# Patient Record
Sex: FEMALE | Race: WHITE | NOT HISPANIC OR LATINO | Employment: FULL TIME | URBAN - METROPOLITAN AREA
[De-identification: names, ages, dates, MRNs, and addresses within clinical notes are randomized per-mention and may not be internally consistent; named-entity substitution may affect disease eponyms.]

---

## 2017-01-05 ENCOUNTER — ALLSCRIPTS OFFICE VISIT (OUTPATIENT)
Dept: OTHER | Facility: OTHER | Age: 57
End: 2017-01-05

## 2017-01-05 ENCOUNTER — TRANSCRIBE ORDERS (OUTPATIENT)
Dept: ADMINISTRATIVE | Facility: HOSPITAL | Age: 57
End: 2017-01-05

## 2017-01-05 DIAGNOSIS — D05.11 INTRADUCTAL CARCINOMA IN SITU OF RIGHT BREAST: Primary | ICD-10-CM

## 2017-04-18 ENCOUNTER — ALLSCRIPTS OFFICE VISIT (OUTPATIENT)
Dept: OTHER | Facility: OTHER | Age: 57
End: 2017-04-18

## 2017-05-15 ENCOUNTER — HOSPITAL ENCOUNTER (OUTPATIENT)
Dept: MAMMOGRAPHY | Facility: CLINIC | Age: 57
Discharge: HOME | End: 2017-05-15
Payer: COMMERCIAL

## 2017-05-15 DIAGNOSIS — D05.11 INTRADUCTAL CARCINOMA IN SITU OF RIGHT BREAST: ICD-10-CM

## 2017-05-15 PROCEDURE — G0204 DX MAMMO INCL CAD BI: HCPCS

## 2017-08-02 ENCOUNTER — ALLSCRIPTS OFFICE VISIT (OUTPATIENT)
Dept: OTHER | Facility: OTHER | Age: 57
End: 2017-08-02

## 2018-01-12 VITALS
TEMPERATURE: 98 F | BODY MASS INDEX: 28.27 KG/M2 | RESPIRATION RATE: 16 BRPM | HEART RATE: 62 BPM | WEIGHT: 144 LBS | DIASTOLIC BLOOD PRESSURE: 64 MMHG | HEIGHT: 60 IN | SYSTOLIC BLOOD PRESSURE: 102 MMHG

## 2018-01-13 VITALS
HEIGHT: 60 IN | DIASTOLIC BLOOD PRESSURE: 62 MMHG | WEIGHT: 140 LBS | SYSTOLIC BLOOD PRESSURE: 98 MMHG | BODY MASS INDEX: 27.48 KG/M2

## 2018-01-13 VITALS
DIASTOLIC BLOOD PRESSURE: 68 MMHG | RESPIRATION RATE: 18 BRPM | TEMPERATURE: 98.3 F | WEIGHT: 141 LBS | BODY MASS INDEX: 27.68 KG/M2 | HEIGHT: 60 IN | HEART RATE: 70 BPM | SYSTOLIC BLOOD PRESSURE: 98 MMHG

## 2018-02-01 ENCOUNTER — TELEPHONE (OUTPATIENT)
Dept: SURGICAL ONCOLOGY | Facility: CLINIC | Age: 58
End: 2018-02-01

## 2018-04-16 ENCOUNTER — OFFICE VISIT (OUTPATIENT)
Dept: SURGICAL ONCOLOGY | Facility: CLINIC | Age: 58
End: 2018-04-16
Payer: COMMERCIAL

## 2018-04-16 VITALS
TEMPERATURE: 97.8 F | HEIGHT: 60 IN | SYSTOLIC BLOOD PRESSURE: 96 MMHG | HEART RATE: 70 BPM | DIASTOLIC BLOOD PRESSURE: 64 MMHG | RESPIRATION RATE: 16 BRPM | WEIGHT: 142 LBS | BODY MASS INDEX: 27.88 KG/M2

## 2018-04-16 DIAGNOSIS — D05.11 DUCTAL CARCINOMA IN SITU (DCIS) OF RIGHT BREAST: ICD-10-CM

## 2018-04-16 DIAGNOSIS — Z12.31 SCREENING MAMMOGRAM, ENCOUNTER FOR: Primary | ICD-10-CM

## 2018-04-16 PROCEDURE — 99214 OFFICE O/P EST MOD 30 MIN: CPT | Performed by: SURGERY

## 2018-04-16 NOTE — LETTER
April 16, 2018     Zamzam Conway MD  [address]    Patient: Griselda Corona   YOB: 1960   Date of Visit: 4/16/2018       Dear Dr Rayray Dinh:    Thank you for referring Stephan Dewey to me for evaluation  Below are my notes for this consultation  If you have questions, please do not hesitate to call me  I look forward to following your patient along with you  Sincerely,        Dustin Benitez MD        CC: No Recipients  Dustin Benitez MD  4/16/2018 10:43 AM  Sign at close encounter     Surgical Oncology Follow Up       [address] [place] [address] [address] Palaciosanos  1960  [de-identified]  [address] [facility],[address]  [facility] [address] [address]    Chief Complaint   Patient presents with    Breast Cancer     Pt is here for a six month follow up          Assessment & Plan:   Assessment/Plan   No evidence of disease, 5 years out  Screening mammogram, 1 year follow-up  Cancer History:        Ductal carcinoma in situ (DCIS) of right breast    5/14/2013 Initial Diagnosis     Biopsy at [facility]  Right Atypical ductal hyperplasia         6/25/2013 Surgery     Right needle localization/lumpectomy  DCIS  Grade 1      Stage 0         7/2013 -  Hormone Therapy     Med onc consult  No systemic therapy recommended         9/6/2013 - 10/23/2013 Radiation     Whole breast Radiation therapy            History of Present Illness:   See above    Interval History:   Patient is now 5 years out from her surgery  She has no complaints referable to her breast   Her imaging is due in May which will coordinate for her  We will see her back in 1 year's time  Review of Systems:   Review of Systems   Constitutional: Negative for activity change, appetite change, fatigue and unexpected weight change     HENT: Negative for ear pain, tinnitus, trouble swallowing and voice change  Eyes: Negative for pain and visual disturbance  Respiratory: Negative for cough, shortness of breath, wheezing and stridor  Cardiovascular: Negative for chest pain, palpitations and leg swelling  Gastrointestinal: Negative for abdominal distention, abdominal pain and blood in stool  Endocrine: Negative for cold intolerance and heat intolerance  Genitourinary: Negative for difficulty urinating, dysuria, flank pain and hematuria  Musculoskeletal: Negative for arthralgias, back pain, gait problem and joint swelling  Skin: Negative for color change, rash and wound  Allergic/Immunologic: Negative for immunocompromised state  Neurological: Negative for dizziness, seizures, speech difficulty, weakness and headaches  Hematological: Negative for adenopathy  Psychiatric/Behavioral: Negative for confusion         Past Medical History     Patient Active Problem List   Diagnosis    Ductal carcinoma in situ (DCIS) of right breast    Hypercholesterolemia     Past Medical History:   Diagnosis Date    Anxiety     Endometriosis     GERD (gastroesophageal reflux disease)     Hiatal hernia     History of radiation therapy     Sleep apnea      Past Surgical History:   Procedure Laterality Date    BREAST BIOPSY      biopsy breast percutaneous needle core    BREAST LUMPECTOMY Right     CHOLECYSTECTOMY      DILATION AND CURETTAGE OF UTERUS      ENDOMETRIAL BIOPSY      without cervical dilation    LAPAROSCOPY      Exploratory 1990 & 1994    SALUD-EN-Y PROCEDURE       Family History   Problem Relation Age of Onset    Colon cancer Mother     Stroke Father     Colon cancer Maternal Grandmother     Hypertension Family     Mitral valve prolapse Family     Osteoporosis Family     Varicose Veins Family      Social History     Social History    Marital status: /Civil Union     Spouse name: N/A    Number of children: N/A    Years of education: N/A     Occupational History    Not on file  Social History Main Topics    Smoking status: Never Smoker    Smokeless tobacco: Not on file    Alcohol use Yes    Drug use: Unknown    Sexual activity: Yes     Other Topics Concern    Not on file     Social History Narrative    Coffee    Exercise frequency (times/week)           Current Outpatient Prescriptions:     Cholecalciferol (VITAMIN D) 2000 units CAPS, Take by mouth daily, Disp: , Rfl:     Multiple Vitamin (MULTI-VITAMIN DAILY) TABS, Take by mouth, Disp: , Rfl:     polymyxin b-trimethoprim (POLYTRIM) ophthalmic solution, PLACE 1 DROP INTO THE LEFT EYE FOUR TIMES A DAY FOR 5 DAYS, Disp: , Rfl: 0    sertraline (ZOLOFT) 50 mg tablet, Take by mouth, Disp: , Rfl:   No Known Allergies    Physical Exam:     Vitals:    04/16/18 1019   BP: 96/64   Pulse: 70   Resp: 16   Temp: 97 8 °F (36 6 °C)     Physical Exam   Constitutional: She is oriented to person, place, and time  She appears well-developed and well-nourished  HENT:   Head: Normocephalic and atraumatic  Mouth/Throat: Oropharynx is clear and moist    Eyes: EOM are normal  Pupils are equal, round, and reactive to light  Neck: Normal range of motion  Neck supple  No JVD present  No tracheal deviation present  No thyromegaly present  Cardiovascular: Normal rate, regular rhythm, normal heart sounds and intact distal pulses  Exam reveals no gallop and no friction rub  No murmur heard  Pulmonary/Chest: Effort normal and breath sounds normal  No respiratory distress  She has no wheezes  She has no rales  Right breast exhibits no inverted nipple, no mass, no nipple discharge, no skin change and no tenderness  Left breast exhibits no inverted nipple, no mass, no nipple discharge, no skin change and no tenderness  Breasts are symmetrical    Abdominal: Soft  She exhibits no distension and no mass  There is no hepatomegaly  There is no tenderness  There is no rebound and no guarding     Musculoskeletal: Normal range of motion  She exhibits no edema or tenderness  Lymphadenopathy:     She has no cervical adenopathy  Neurological: She is alert and oriented to person, place, and time  No cranial nerve deficit  Skin: Skin is warm and dry  No rash noted  No erythema  Psychiatric: She has a normal mood and affect  Her behavior is normal    Vitals reviewed  Results:       Discussion/Summary:     Based on today's history and physical exam, there is no evidence of local, regional or distant recurrence  The patients imaging is up to date without worrisome findings  Hence she is free of any clinical or radiologic evidence of recurrent disease  We will see the patient back in one year based on the NCCN guidelines  The patient knows to contact us if she develops any signs or symptoms of recurrent disease  All questions were answered to the patients satisfaction  The patient is agreeable to see Giovanni Wiggins at her next visit  Advance Care Planning/Advance Directives:  I discussed the disease status, treatment plans and follow-up with the patient

## 2018-04-16 NOTE — PROGRESS NOTES
Surgical Oncology Follow Up       [address]  [facility] [place]  [address] Claire  1960  [de-identified]  [address]  [facility] [place]  [address] [place] [id number]    Chief Complaint   Patient presents with    Breast Cancer     Pt is here for a six month follow up          Assessment & Plan:   Assessment/Plan   No evidence of disease, 5 years out  Screening mammogram, 1 year follow-up  Cancer History:        Ductal carcinoma in situ (DCIS) of right breast    5/14/2013 Initial Diagnosis     Biopsy at [facility]  Right Atypical ductal hyperplasia         6/25/2013 Surgery     Right needle localization/lumpectomy  DCIS  Grade 1      Stage 0         7/2013 -  Hormone Therapy     Med onc consult  No systemic therapy recommended         9/6/2013 - 10/23/2013 Radiation     Whole breast Radiation therapy            History of Present Illness:   See above    Interval History:   Patient is now 5 years out from her surgery  She has no complaints referable to her breast   Her imaging is due in May which will coordinate for her  We will see her back in 1 year's time  Review of Systems:   Review of Systems   Constitutional: Negative for activity change, appetite change, fatigue and unexpected weight change  HENT: Negative for ear pain, tinnitus, trouble swallowing and voice change  Eyes: Negative for pain and visual disturbance  Respiratory: Negative for cough, shortness of breath, wheezing and stridor  Cardiovascular: Negative for chest pain, palpitations and leg swelling  Gastrointestinal: Negative for abdominal distention, abdominal pain and blood in stool  Endocrine: Negative for cold intolerance and heat intolerance  Genitourinary: Negative for difficulty urinating, dysuria, flank pain and hematuria     Musculoskeletal: Negative for arthralgias, back pain, gait problem and joint swelling  Skin: Negative for color change, rash and wound  Allergic/Immunologic: Negative for immunocompromised state  Neurological: Negative for dizziness, seizures, speech difficulty, weakness and headaches  Hematological: Negative for adenopathy  Psychiatric/Behavioral: Negative for confusion  Past Medical History     Patient Active Problem List   Diagnosis    Ductal carcinoma in situ (DCIS) of right breast    Hypercholesterolemia     Past Medical History:   Diagnosis Date    Anxiety     Endometriosis     GERD (gastroesophageal reflux disease)     Hiatal hernia     History of radiation therapy     Sleep apnea      Past Surgical History:   Procedure Laterality Date    BREAST BIOPSY      biopsy breast percutaneous needle core    BREAST LUMPECTOMY Right     CHOLECYSTECTOMY      DILATION AND CURETTAGE OF UTERUS      ENDOMETRIAL BIOPSY      without cervical dilation    LAPAROSCOPY      Exploratory 1990 & 1994    SALUD-EN-Y PROCEDURE       Family History   Problem Relation Age of Onset    Colon cancer Mother     Stroke Father     Colon cancer Maternal Grandmother     Hypertension Family     Mitral valve prolapse Family     Osteoporosis Family     Varicose Veins Family      Social History     Social History    Marital status: /Civil Union     Spouse name: N/A    Number of children: N/A    Years of education: N/A     Occupational History    Not on file       Social History Main Topics    Smoking status: Never Smoker    Smokeless tobacco: Not on file    Alcohol use Yes    Drug use: Unknown    Sexual activity: Yes     Other Topics Concern    Not on file     Social History Narrative    Coffee    Exercise frequency (times/week)           Current Outpatient Prescriptions:     Cholecalciferol (VITAMIN D) 2000 units CAPS, Take by mouth daily, Disp: , Rfl:     Multiple Vitamin (MULTI-VITAMIN DAILY) TABS, Take by mouth, Disp: , Rfl:     polymyxin b-trimethoprim (POLYTRIM) ophthalmic solution, PLACE 1 DROP INTO THE LEFT EYE FOUR TIMES A DAY FOR 5 DAYS, Disp: , Rfl: 0    sertraline (ZOLOFT) 50 mg tablet, Take by mouth, Disp: , Rfl:   No Known Allergies    Physical Exam:     Vitals:    04/16/18 1019   BP: 96/64   Pulse: 70   Resp: 16   Temp: 97 8 °F (36 6 °C)     Physical Exam   Constitutional: She is oriented to person, place, and time  She appears well-developed and well-nourished  HENT:   Head: Normocephalic and atraumatic  Mouth/Throat: Oropharynx is clear and moist    Eyes: EOM are normal  Pupils are equal, round, and reactive to light  Neck: Normal range of motion  Neck supple  No JVD present  No tracheal deviation present  No thyromegaly present  Cardiovascular: Normal rate, regular rhythm, normal heart sounds and intact distal pulses  Exam reveals no gallop and no friction rub  No murmur heard  Pulmonary/Chest: Effort normal and breath sounds normal  No respiratory distress  She has no wheezes  She has no rales  Right breast exhibits no inverted nipple, no mass, no nipple discharge, no skin change and no tenderness  Left breast exhibits no inverted nipple, no mass, no nipple discharge, no skin change and no tenderness  Breasts are symmetrical    Abdominal: Soft  She exhibits no distension and no mass  There is no hepatomegaly  There is no tenderness  There is no rebound and no guarding  Musculoskeletal: Normal range of motion  She exhibits no edema or tenderness  Lymphadenopathy:     She has no cervical adenopathy  Neurological: She is alert and oriented to person, place, and time  No cranial nerve deficit  Skin: Skin is warm and dry  No rash noted  No erythema  Psychiatric: She has a normal mood and affect  Her behavior is normal    Vitals reviewed  Results:       Discussion/Summary:     Based on today's history and physical exam, there is no evidence of local, regional or distant recurrence   The patients imaging is up to date without worrisome findings  Hence she is free of any clinical or radiologic evidence of recurrent disease  We will see the patient back in one year based on the NCCN guidelines  The patient knows to contact us if she develops any signs or symptoms of recurrent disease  All questions were answered to the patients satisfaction  The patient is agreeable to see Uriah Goel at her next visit  Advance Care Planning/Advance Directives:  I discussed the disease status, treatment plans and follow-up with the patient

## 2018-06-04 ENCOUNTER — HOSPITAL ENCOUNTER (OUTPATIENT)
Dept: MAMMOGRAPHY | Facility: CLINIC | Age: 58
Discharge: HOME | End: 2018-06-04
Payer: COMMERCIAL

## 2018-06-04 DIAGNOSIS — R92.8 ABNORMAL MAMMOGRAM: ICD-10-CM

## 2018-06-04 PROCEDURE — G0279 TOMOSYNTHESIS, MAMMO: HCPCS

## 2018-06-04 PROCEDURE — 77066 DX MAMMO INCL CAD BI: CPT

## 2019-01-17 ENCOUNTER — ANNUAL EXAM (OUTPATIENT)
Dept: OBGYN CLINIC | Facility: MEDICAL CENTER | Age: 59
End: 2019-01-17
Payer: COMMERCIAL

## 2019-01-17 VITALS
BODY MASS INDEX: 30.63 KG/M2 | HEIGHT: 60 IN | SYSTOLIC BLOOD PRESSURE: 120 MMHG | WEIGHT: 156 LBS | DIASTOLIC BLOOD PRESSURE: 74 MMHG

## 2019-01-17 DIAGNOSIS — Z12.4 ENCOUNTER FOR PAPANICOLAOU SMEAR OF CERVIX WITH HUMAN PAPILLOMA VIRUS (HPV) DNA COTESTING: ICD-10-CM

## 2019-01-17 DIAGNOSIS — Z01.419 ENCNTR FOR GYN EXAM (GENERAL) (ROUTINE) W/O ABN FINDINGS: Primary | ICD-10-CM

## 2019-01-17 PROCEDURE — G0145 SCR C/V CYTO,THINLAYER,RESCR: HCPCS | Performed by: PHYSICIAN ASSISTANT

## 2019-01-17 PROCEDURE — 87624 HPV HI-RISK TYP POOLED RSLT: CPT | Performed by: PHYSICIAN ASSISTANT

## 2019-01-17 PROCEDURE — 99396 PREV VISIT EST AGE 40-64: CPT | Performed by: PHYSICIAN ASSISTANT

## 2019-01-17 NOTE — PROGRESS NOTES
Assessment/Plan:    Encntr for gyn exam (general) (routine) w/o abn findings  I have discussed the importance of monthly self-breast exams, exercise and healthy diet as well as adequate intake of calcium and vitamin D  The patient declines STD testing  The current ASCCP guidelines were reviewed  The low risk patient will receive pap smear screening every 3 years or pap with HPV co-testing every 5 years  The patient previously had PAP in 2016 and is due again today  I emphasized the importance of an annual pelvic and breast exam  A yearly mammogram is recommended for breast cancer screening starting at age 36  All questions have been answered to her satisfaction  Diagnoses and all orders for this visit:    Encntr for gyn exam (general) (routine) w/o abn findings  -     Liquid-based pap, screening    Encounter for Papanicolaou smear of cervix with human papilloma virus (HPV) DNA cotesting  -     Liquid-based pap, screening        Subjective:      Patient ID: Laura Gibson is a 60 y o  female  The patient comes in today for annual Gyn exam  The patient has no history of PMB, pelvic pain, abnormal vaginal discharge, breast mass or lumps, urinary symptoms, urinary incontinence, depression, and pelvic/vaginal pressure  H/o R breast CA  - s/p lumpectomy and radiation tx  Up to date w/ imaging - no evidence of recurrence  Follows yearly w/ Dr Gallegos Records  Pt reports all additional systems reviewed are negative  The patient admits to monthly self breast exams, regular exercise, healthy diet,  and calcium and Vitamin D intake  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            The following portions of the patient's history were reviewed and updated as appropriate: allergies, current medications, past family history, past medical history, past social history, past surgical history and problem list     Review of Systems   Constitutional: Negative for appetite change, fatigue and unexpected weight change  Respiratory: Negative for chest tightness and shortness of breath  Cardiovascular: Negative for chest pain, palpitations and leg swelling  Gastrointestinal: Negative for abdominal pain, constipation, diarrhea, nausea and vomiting  Genitourinary: Negative for difficulty urinating, dyspareunia, menstrual problem, pelvic pain and vaginal discharge  Musculoskeletal: Negative for arthralgias and myalgias  Neurological: Negative for dizziness, light-headedness and headaches  Psychiatric/Behavioral: Negative for dysphoric mood  The patient is not nervous/anxious  All other systems reviewed and are negative  Objective:      /74 (BP Location: Left arm, Patient Position: Sitting)   Ht 5' (1 524 m)   Wt 70 8 kg (156 lb)   BMI 30 47 kg/m²          Physical Exam   Constitutional: She is oriented to person, place, and time  She appears well-developed and well-nourished  HENT:   Head: Normocephalic and atraumatic  Neck: Neck supple  No thyromegaly present  Cardiovascular: Normal rate and regular rhythm  Pulmonary/Chest: Effort normal and breath sounds normal  Right breast exhibits no inverted nipple, no mass, no nipple discharge, no skin change and no tenderness  Left breast exhibits no inverted nipple, no mass, no nipple discharge, no skin change and no tenderness  Abdominal: Soft  Bowel sounds are normal  Hernia confirmed negative in the right inguinal area and confirmed negative in the left inguinal area  Genitourinary: Vagina normal and uterus normal  No breast swelling, tenderness, discharge or bleeding  There is no rash, tenderness, lesion or injury on the right labia  There is no rash, tenderness, lesion or injury on the left labia  Uterus is not deviated, not enlarged, not fixed and not tender  Cervix exhibits no motion tenderness, no discharge and no friability  Right adnexum displays no mass, no tenderness and no fullness   Left adnexum displays no mass, no tenderness and no fullness  No vaginal discharge found  Neurological: She is alert and oriented to person, place, and time  Skin: Skin is warm and dry  Psychiatric: She has a normal mood and affect  Nursing note and vitals reviewed

## 2019-01-17 NOTE — ASSESSMENT & PLAN NOTE
I have discussed the importance of monthly self-breast exams, exercise and healthy diet as well as adequate intake of calcium and vitamin D  The patient declines STD testing  The current ASCCP guidelines were reviewed  The low risk patient will receive pap smear screening every 3 years or pap with HPV co-testing every 5 years  The patient previously had PAP in 2016 and is due again today  I emphasized the importance of an annual pelvic and breast exam  A yearly mammogram is recommended for breast cancer screening starting at age 36  All questions have been answered to her satisfaction

## 2019-01-18 LAB
HPV HR 12 DNA CVX QL NAA+PROBE: POSITIVE
HPV16 DNA CVX QL NAA+PROBE: NEGATIVE
HPV18 DNA CVX QL NAA+PROBE: NEGATIVE

## 2019-01-23 LAB
LAB AP GYN PRIMARY INTERPRETATION: NORMAL
Lab: NORMAL

## 2019-01-24 ENCOUNTER — TELEPHONE (OUTPATIENT)
Dept: OBGYN CLINIC | Facility: CLINIC | Age: 59
End: 2019-01-24

## 2019-01-24 NOTE — TELEPHONE ENCOUNTER
----- Message from Rob Rodriguez PA-C sent at 1/23/2019  4:37 PM EST -----  Normal cytology +other high risk HPV  rec repeat testing in 1 yr  thanks

## 2019-07-02 ENCOUNTER — TELEPHONE (OUTPATIENT)
Dept: SURGICAL ONCOLOGY | Facility: CLINIC | Age: 59
End: 2019-07-02

## 2019-07-02 NOTE — TELEPHONE ENCOUNTER
Called patient regarding overdue follow up and mammogram  Patient states that she lost her job and has been without insurance  Offered to reach out to the oncology financial counselors regarding this; patient accepted  Patient stated she wants to continue having her follow up appointments and mammograms when she can afford it  Patient stated she will call the office to schedule an appointment when she has insurance

## 2019-07-03 ENCOUNTER — TELEPHONE (OUTPATIENT)
Dept: HEMATOLOGY ONCOLOGY | Facility: CLINIC | Age: 59
End: 2019-07-03

## 2019-07-03 NOTE — TELEPHONE ENCOUNTER
LMOM with contact info for pt- requires FC as she is has no insurance and requires f/u for her breast ca

## 2019-07-16 ENCOUNTER — DOCUMENTATION (OUTPATIENT)
Dept: HEMATOLOGY ONCOLOGY | Facility: CLINIC | Age: 59
End: 2019-07-16

## 2019-07-16 NOTE — PROGRESS NOTES
recvd email that pt has no insurance & is overdue for her mammogram  Called pt & she said that she lost her job in [place] 2019 & her insurance termd end of jan  She sd her daughter has medicaid & is treated differently so pt doesn't want to apply for that  She said that she will be running out of her unemployment in 2-4 weeks but she would rather wit until she gets a job before she schedules her tests  She is hoping to have a job soon  Told her about [facility] but she wants to wait & see  She did say that if she changes her mind she will call back to me   Emailed [facility] that she doesn't have to f/u w/the pt

## 2019-12-09 ENCOUNTER — TRANSCRIBE ORDERS (OUTPATIENT)
Dept: ADMINISTRATIVE | Facility: HOSPITAL | Age: 59
End: 2019-12-09

## 2019-12-09 DIAGNOSIS — Z12.31 ENCOUNTER FOR SCREENING MAMMOGRAM FOR MALIGNANT NEOPLASM OF BREAST: ICD-10-CM

## 2019-12-09 DIAGNOSIS — Z02.1 PRE-EMPLOYMENT HEALTH SCREENING EXAMINATION: Primary | ICD-10-CM

## 2019-12-11 ENCOUNTER — HOSPITAL ENCOUNTER (OUTPATIENT)
Dept: MAMMOGRAPHY | Facility: CLINIC | Age: 59
Discharge: HOME | End: 2019-12-11
Payer: COMMERCIAL

## 2019-12-11 VITALS — BODY MASS INDEX: 30.63 KG/M2 | HEIGHT: 60 IN | WEIGHT: 156 LBS

## 2019-12-11 DIAGNOSIS — Z12.31 ENCOUNTER FOR SCREENING MAMMOGRAM FOR MALIGNANT NEOPLASM OF BREAST: ICD-10-CM

## 2019-12-11 PROCEDURE — 77063 BREAST TOMOSYNTHESIS BI: CPT

## 2019-12-11 PROCEDURE — 77067 SCR MAMMO BI INCL CAD: CPT

## 2019-12-19 ENCOUNTER — OFFICE VISIT (OUTPATIENT)
Dept: SURGICAL ONCOLOGY | Facility: CLINIC | Age: 59
End: 2019-12-19
Payer: COMMERCIAL

## 2019-12-19 VITALS
WEIGHT: 145 LBS | SYSTOLIC BLOOD PRESSURE: 118 MMHG | HEIGHT: 60 IN | TEMPERATURE: 98 F | RESPIRATION RATE: 16 BRPM | DIASTOLIC BLOOD PRESSURE: 78 MMHG | BODY MASS INDEX: 28.47 KG/M2 | HEART RATE: 64 BPM

## 2019-12-19 DIAGNOSIS — Z12.31 SCREENING MAMMOGRAM, ENCOUNTER FOR: ICD-10-CM

## 2019-12-19 DIAGNOSIS — Z86.000 HISTORY OF DUCTAL CARCINOMA IN SITU (DCIS) OF BREAST: Primary | ICD-10-CM

## 2019-12-19 DIAGNOSIS — Z08 ENCOUNTER FOR FOLLOW-UP SURVEILLANCE OF DUCTAL CARCINOMA IN SITU OF BREAST: ICD-10-CM

## 2019-12-19 DIAGNOSIS — Z86.000 ENCOUNTER FOR FOLLOW-UP SURVEILLANCE OF DUCTAL CARCINOMA IN SITU OF BREAST: ICD-10-CM

## 2019-12-19 PROCEDURE — 99213 OFFICE O/P EST LOW 20 MIN: CPT | Performed by: SURGERY

## 2019-12-19 NOTE — PROGRESS NOTES
Surgical Oncology Follow Up       [address] [facility],[address]  [facility]  [address] [facility] [address] Kathleen Martino  1960  [de-identified]  [address]  [facility]  [address] [address]    Chief Complaint   Patient presents with    Follow-up          Assessment & Plan:   Melanie Bruns presents for 1 year follow-up visit  She has no complaints referable to her breast   Her mammogram showed no worrisome findings  We will coordinate a screening mammogram for approximately 1 year from now and see her back at that time  She is agreeable see our advanced practitioner  She understands to contact us should she appreciate any worrisome findings regarding her breast   I have also encouraged her to have a low threshold to contact us  Cancer History:        History of ductal carcinoma in situ (DCIS) of breast    5/14/2013 Initial Diagnosis     Biopsy at [facility]  Right Atypical ductal hyperplasia      6/25/2013 Surgery     Right needle localization/lumpectomy  DCIS  Grade 1      Stage 0      7/2013 -  Hormone Therapy     Med onc consult  No systemic therapy recommended      9/6/2013 - 10/23/2013 Radiation     Whole breast Radiation therapy           Interval History:   See Assessment & Plan    Review of Systems:   Review of Systems   Constitutional: Negative for activity change, appetite change and fatigue  HENT: Negative  Eyes: Negative  Respiratory: Negative for cough, shortness of breath and wheezing  Cardiovascular: Negative for chest pain and leg swelling  Gastrointestinal: Negative  Endocrine: Negative  Genitourinary: Negative  Musculoskeletal:        No new changes or complaints of bone pain   Skin: Negative  Allergic/Immunologic: Negative  Neurological: Negative  Hematological: Negative  Psychiatric/Behavioral: Negative          Past Medical History Patient Active Problem List   Diagnosis    History of ductal carcinoma in situ (DCIS) of breast    Hypercholesterolemia    Asthma    Mixed hyperlipidemia    Obesity, unspecified     Past Medical History:   Diagnosis Date    Anxiety     Breast cancer ([address] ) 01/01/2012    Endometriosis     Forceps delivery     1991 daughter    GERD (gastroesophageal reflux disease)     Hiatal hernia     History of radiation therapy     Infertility, female     Normal delivery     1998 daughter    Sleep apnea      Past Surgical History:   Procedure Laterality Date    BREAST BIOPSY Right 01/01/2012    biopsy breast percutaneous needle core    BREAST LUMPECTOMY Right 01/01/2012    CHOLECYSTECTOMY      DILATION AND CURETTAGE OF UTERUS      ENDOMETRIAL BIOPSY      without cervical dilation    LAPAROSCOPY      Exploratory 1990 & 1994    SALUD-EN-Y PROCEDURE       Family History   Problem Relation Age of Onset    Colon cancer Mother 48    Stroke Father     No Known Problems Maternal Grandmother     Hypertension Family     Mitral valve prolapse Family     Osteoporosis Family     Varicose Veins Family     No Known Problems Sister     No Known Problems Daughter     No Known Problems Paternal Grandmother     No Known Problems Daughter     No Known Problems Paternal Aunt      Social History     Socioeconomic History    Marital status: /Civil Union     Spouse name: Not on file    Number of children: Not on file    Years of education: Not on file    Highest education level: Not on file   Occupational History    Not on file   Social Needs    Financial resource strain: Not on file    Food insecurity:     Worry: Not on file     Inability: Not on file    Transportation needs:     Medical: Not on file     Non-medical: Not on file   Tobacco Use    Smoking status: Never Smoker   Substance and Sexual Activity    Alcohol use:  Yes    Drug use: Not on file    Sexual activity: Yes   Lifestyle    Physical activity:     Days per week: Not on file     Minutes per session: Not on file    Stress: Not on file   Relationships    Social connections:     Talks on phone: Not on file     Gets together: Not on file     Attends [employer] service: Not on file     Active member of club or organization: Not on file     Attends meetings of clubs or organizations: Not on file     Relationship status: Not on file    Intimate partner violence:     Fear of current or ex partner: Not on file     Emotionally abused: Not on file     Physically abused: Not on file     Forced sexual activity: Not on file   Other Topics Concern    Not on file   Social History Narrative    Coffee    Exercise frequency (times/week)       Current Outpatient Medications:     Cholecalciferol (VITAMIN D) 2000 units CAPS, Take by mouth daily, Disp: , Rfl:     Multiple Vitamin (MULTI-VITAMIN DAILY) TABS, Take by mouth, Disp: , Rfl:     sertraline (ZOLOFT) 50 mg tablet, Take by mouth, Disp: , Rfl:   No Known Allergies    Physical Exam:     Vitals:    12/19/19 1147   BP: 118/78   Pulse: 64   Resp: 16   Temp: 98 °F (36 7 °C)     Physical Exam   Constitutional: She is oriented to person, place, and time  She appears well-developed and well-nourished  HENT:   Head: Normocephalic and atraumatic  Mouth/Throat: Oropharynx is clear and moist    Eyes: Pupils are equal, round, and reactive to light  EOM are normal    Neck: Normal range of motion  Neck supple  No JVD present  No tracheal deviation present  No thyromegaly present  Cardiovascular: Normal rate, regular rhythm, normal heart sounds and intact distal pulses  Exam reveals no gallop and no friction rub  No murmur heard  Pulmonary/Chest: Effort normal and breath sounds normal  No respiratory distress  She has no wheezes  She has no rales  Examination of the right breast demonstrates a well-healed lower outer quadrant incision  There is some indentation from this this is stable    There is minimal scar tissue in this region  The remainder of her skin and breast parenchyma show no worrisome findings specifically no dominant masses  There is no axillary adenopathy  Examination of the left breast demonstrates no skin changes nipple discharge dominant masses or axillary adenopathy  Abdominal: Soft  She exhibits no distension and no mass  There is no hepatomegaly  There is no tenderness  There is no rebound and no guarding  Musculoskeletal: Normal range of motion  She exhibits no edema or tenderness  Lymphadenopathy:     She has no cervical adenopathy  Neurological: She is alert and oriented to person, place, and time  No cranial nerve deficit  Skin: Skin is warm and dry  No rash noted  No erythema  Psychiatric: She has a normal mood and affect  Her behavior is normal    Vitals reviewed  Results & Discussion:   Patient is free of any clinical or radiographic evidence of disease  We will see her back in 1 year's time with a screening mammogram         Advance Care Planning/Advance Directives:  I discussed the disease status, treatment plans and follow-up with the patient

## 2019-12-20 ENCOUNTER — APPOINTMENT (OUTPATIENT)
Dept: URGENT CARE | Facility: MEDICAL CENTER | Age: 59
End: 2019-12-20

## 2019-12-20 DIAGNOSIS — Z02.1 PRE-EMPLOYMENT HEALTH SCREENING EXAMINATION: ICD-10-CM

## 2019-12-20 LAB — HBV SURFACE AB SER-ACNC: 119.06 MIU/ML

## 2019-12-20 PROCEDURE — 86480 TB TEST CELL IMMUN MEASURE: CPT

## 2019-12-20 PROCEDURE — 86706 HEP B SURFACE ANTIBODY: CPT

## 2019-12-20 PROCEDURE — 86787 VARICELLA-ZOSTER ANTIBODY: CPT

## 2019-12-23 LAB
GAMMA INTERFERON BACKGROUND BLD IA-ACNC: 0.07 IU/ML
M TB IFN-G BLD-IMP: NEGATIVE
M TB IFN-G CD4+ BCKGRND COR BLD-ACNC: 0.02 IU/ML
M TB IFN-G CD4+ BCKGRND COR BLD-ACNC: 0.03 IU/ML
MITOGEN IGNF BCKGRD COR BLD-ACNC: >10 IU/ML
VZV IGG SER IA-ACNC: NORMAL

## 2020-02-18 ENCOUNTER — ANNUAL EXAM (OUTPATIENT)
Dept: OBGYN CLINIC | Facility: MEDICAL CENTER | Age: 60
End: 2020-02-18
Payer: COMMERCIAL

## 2020-02-18 VITALS
DIASTOLIC BLOOD PRESSURE: 64 MMHG | WEIGHT: 143.8 LBS | SYSTOLIC BLOOD PRESSURE: 106 MMHG | BODY MASS INDEX: 28.23 KG/M2 | HEIGHT: 60 IN

## 2020-02-18 DIAGNOSIS — Z12.31 ENCOUNTER FOR SCREENING MAMMOGRAM FOR MALIGNANT NEOPLASM OF BREAST: ICD-10-CM

## 2020-02-18 DIAGNOSIS — Z86.000 HISTORY OF DUCTAL CARCINOMA IN SITU (DCIS) OF BREAST: ICD-10-CM

## 2020-02-18 DIAGNOSIS — Z11.51 SCREENING FOR HPV (HUMAN PAPILLOMAVIRUS): ICD-10-CM

## 2020-02-18 DIAGNOSIS — Z01.419 ENCOUNTER FOR GYNECOLOGICAL EXAMINATION WITHOUT ABNORMAL FINDING: Primary | ICD-10-CM

## 2020-02-18 PROCEDURE — 99396 PREV VISIT EST AGE 40-64: CPT | Performed by: NURSE PRACTITIONER

## 2020-02-18 PROCEDURE — G0145 SCR C/V CYTO,THINLAYER,RESCR: HCPCS | Performed by: NURSE PRACTITIONER

## 2020-02-18 PROCEDURE — 87624 HPV HI-RISK TYP POOLED RSLT: CPT | Performed by: NURSE PRACTITIONER

## 2020-02-18 NOTE — ASSESSMENT & PLAN NOTE
Benign findings on routine gyn exam  Recommended monthly SBE, annual CBE and annual screening mammo  ASCCP guidelines reviewed and pap with cotesting collected today; this low risk patient was advised she meets criteria to d/c pap screening at age 71  Colonoscopy noted to be up to date  The patient denies STI risk factors and declines testing at this time  Reviewed diet/activity recommendations:  Encouraged daily Ca++ and vitamin D intake as well as daily weight bearing exercise for promotion of bone health    Discussed postmenopausal considerations and symptoms to report  RTO in one year for routine annual gyn exam or sooner PRN

## 2020-02-18 NOTE — PROGRESS NOTES
Encounter for gynecological examination without abnormal finding  Benign findings on routine gyn exam  Recommended monthly SBE, annual CBE and annual screening mammo  ASCCP guidelines reviewed and pap with cotesting collected today; this low risk patient was advised she meets criteria to d/c pap screening at age 71  Colonoscopy noted to be up to date  The patient denies STI risk factors and declines testing at this time  Reviewed diet/activity recommendations:  Encouraged daily Ca++ and vitamin D intake as well as daily weight bearing exercise for promotion of bone health    Discussed postmenopausal considerations and symptoms to report  RTO in one year for routine annual gyn exam or sooner PRN  History of ductal carcinoma in situ (DCIS) of breast  S/P lumpectomy and radiation  Diagnoses and all orders for this visit:    Encounter for gynecological examination without abnormal finding  -     Liquid-based pap, screening    Screening for HPV (human papillomavirus)  -     Liquid-based pap, screening    Encounter for screening mammogram for malignant neoplasm of breast  -     Mammo screening bilateral w 3d & cad; Future    History of ductal carcinoma in situ (DCIS) of breast         [facility]   1960    CC:  Yearly exam    S: Nadia Stoll is a  60 y o  female here for yearly exam  She is postmenopausal and has had no vaginal bleeding  She denies abnormal vaginal discharge, itching, odor or dryness  She denies breast concerns, abdominal/pelvic pain or bladder/bowel dysfunction  Sexual activity: She is sexually active without pain, bleeding or dryness  STD testing: She does want STD testing today       Last Pap: 1/19 normal pap + HR HPV not 16 or 18   Last Mammo: 12/19 NATALIE 2  SBE: monthly   Last Colonoscopy: 3/16     Family hx of breast cancer:  Patient   Family hx of ovarian cancer: denies  Family hx of colon cancer: denies       Current Outpatient Medications:     Cholecalciferol (VITAMIN D) 2000 units CAPS, Take by mouth daily, Disp: , Rfl:     Multiple Vitamin (MULTI-VITAMIN DAILY) TABS, Take by mouth, Disp: , Rfl:     sertraline (ZOLOFT) 50 mg tablet, Take by mouth, Disp: , Rfl:   Social History     Socioeconomic History    Marital status: /Civil Union     Spouse name: Not on file    Number of children: Not on file    Years of education: Not on file    Highest education level: Not on file   Occupational History    Not on file   Social Needs    Financial resource strain: Not on file    Food insecurity:     Worry: Not on file     Inability: Not on file    Transportation needs:     Medical: Not on file     Non-medical: Not on file   Tobacco Use    Smoking status: Never Smoker    Smokeless tobacco: Never Used   Substance and Sexual Activity    Alcohol use:  Yes    Drug use: Never    Sexual activity: Yes     Partners: Male   Lifestyle    Physical activity:     Days per week: Not on file     Minutes per session: Not on file    Stress: Not on file   Relationships    Social connections:     Talks on phone: Not on file     Gets together: Not on file     Attends [employer] service: Not on file     Active member of club or organization: Not on file     Attends meetings of clubs or organizations: Not on file     Relationship status: Not on file    Intimate partner violence:     Fear of current or ex partner: Not on file     Emotionally abused: Not on file     Physically abused: Not on file     Forced sexual activity: Not on file   Other Topics Concern    Not on file   Social History Narrative    Coffee    Exercise frequency (times/week)     Family History   Problem Relation Age of Onset    Colon cancer Mother 48    Stroke Father     No Known Problems Maternal Grandmother     Hypertension Family     Mitral valve prolapse Family     Osteoporosis Family     Varicose Veins Family     No Known Problems Sister     No Known Problems Daughter     No Known Problems Paternal Grandmother    [facility] No Known Problems Daughter     No Known Problems Paternal Aunt      Past Medical History:   Diagnosis Date    Anxiety     Breast [address] [address] ) 01/01/2012    Endometriosis     Forceps delivery     1991 daughter    GERD (gastroesophageal reflux disease)     Hiatal hernia     History of radiation therapy     Infertility, female     Normal delivery     1998 daughter    Sleep apnea         Review of Systems   Constitutional: Negative for appetite change, fatigue and unexpected weight change  Respiratory: Negative for shortness of breath  Cardiovascular: Negative for chest pain and leg swelling  Gastrointestinal: Negative for abdominal pain  Endocrine: Negative for cold intolerance and heat intolerance  Breasts:  Negative for breast tenderness or masses  Genitourinary: Negative  Negative for dyspareunia, dysuria, flank pain, frequency, genital sores, hematuria and pelvic pain  Negative for stress incontinence  Musculoskeletal: Negative for back pain  Neurological: Negative for headaches  O:  Blood pressure 106/64, height 5' (1 524 m), weight 65 2 kg (143 lb 12 8 oz)  Patient appears well and is not in distress  Neck is supple without masses  Normal thyroid  Heart regular rate and rhythm  Lungs CTA bilaterally   Breasts are symmetrical without mass, tenderness, nipple discharge, skin changes or adenopathy  + scar right breast mid outer region   Abdomen is soft and nontender without masses  External genitals are normal without lesions or rashes  Urethral meatus and urethra are normal  Bladder is normal to palpation  Vagina is normal without discharge or bleeding  Moderate atrophy noted   Cervix is normal without discharge or lesion  Uterus is normal, mobile, nontender without palpable mass  Adnexa are normal, nontender, without palpable mass     Skin warm and dry   Capillary refill < 2 seconds  Alert and oriented x 3 with normal affect

## 2020-02-19 LAB
HPV HR 12 DNA CVX QL NAA+PROBE: NEGATIVE
HPV16 DNA CVX QL NAA+PROBE: NEGATIVE
HPV18 DNA CVX QL NAA+PROBE: NEGATIVE

## 2020-02-21 LAB
LAB AP GYN PRIMARY INTERPRETATION: NORMAL
Lab: NORMAL

## 2020-02-27 ENCOUNTER — TELEPHONE (OUTPATIENT)
Dept: OBGYN CLINIC | Facility: CLINIC | Age: 60
End: 2020-02-27

## 2020-03-18 ENCOUNTER — APPOINTMENT (OUTPATIENT)
Dept: LAB | Age: 60
End: 2020-03-18

## 2020-03-18 ENCOUNTER — TRANSCRIBE ORDERS (OUTPATIENT)
Dept: ADMINISTRATIVE | Age: 60
End: 2020-03-18

## 2020-03-18 DIAGNOSIS — Z00.8 HEALTH EXAMINATION IN POPULATION SURVEY: ICD-10-CM

## 2020-03-18 DIAGNOSIS — Z00.8 HEALTH EXAMINATION IN POPULATION SURVEY: Primary | ICD-10-CM

## 2020-03-18 LAB
CHOLEST SERPL-MCNC: 190 MG/DL (ref 50–200)
EST. AVERAGE GLUCOSE BLD GHB EST-MCNC: 108 MG/DL
HBA1C MFR BLD: 5.4 %
HDLC SERPL-MCNC: 67 MG/DL
LDLC SERPL CALC-MCNC: 102 MG/DL (ref 0–100)
NONHDLC SERPL-MCNC: 123 MG/DL
TRIGL SERPL-MCNC: 103 MG/DL

## 2020-03-18 PROCEDURE — 80061 LIPID PANEL: CPT

## 2020-03-18 PROCEDURE — 36415 COLL VENOUS BLD VENIPUNCTURE: CPT

## 2020-03-18 PROCEDURE — 83036 HEMOGLOBIN GLYCOSYLATED A1C: CPT

## 2020-08-13 DIAGNOSIS — G47.33 OBSTRUCTIVE SLEEP APNEA: Primary | ICD-10-CM

## 2020-11-29 ENCOUNTER — OFFICE VISIT (OUTPATIENT)
Dept: URGENT CARE | Age: 60
End: 2020-11-29
Payer: COMMERCIAL

## 2020-11-29 VITALS — WEIGHT: 134 LBS | BODY MASS INDEX: 26.31 KG/M2 | HEIGHT: 60 IN

## 2020-11-29 DIAGNOSIS — R05.9 COUGH: Primary | ICD-10-CM

## 2020-11-29 DIAGNOSIS — J02.9 SORE THROAT: ICD-10-CM

## 2020-11-29 DIAGNOSIS — R09.81 NASAL CONGESTION: ICD-10-CM

## 2020-11-29 PROCEDURE — G0381 LEV 2 HOSP TYPE B ED VISIT: HCPCS | Performed by: PHYSICIAN ASSISTANT

## 2020-11-29 PROCEDURE — U0003 INFECTIOUS AGENT DETECTION BY NUCLEIC ACID (DNA OR RNA); SEVERE ACUTE RESPIRATORY SYNDROME CORONAVIRUS 2 (SARS-COV-2) (CORONAVIRUS DISEASE [COVID-19]), AMPLIFIED PROBE TECHNIQUE, MAKING USE OF HIGH THROUGHPUT TECHNOLOGIES AS DESCRIBED BY CMS-2020-01-R: HCPCS | Performed by: PHYSICIAN ASSISTANT

## 2020-12-01 LAB — SARS-COV-2 RNA SPEC QL NAA+PROBE: NOT DETECTED

## 2020-12-02 ENCOUNTER — OFFICE VISIT (OUTPATIENT)
Dept: URGENT CARE | Facility: MEDICAL CENTER | Age: 60
End: 2020-12-02
Payer: COMMERCIAL

## 2020-12-02 VITALS
SYSTOLIC BLOOD PRESSURE: 111 MMHG | HEART RATE: 69 BPM | HEIGHT: 60 IN | DIASTOLIC BLOOD PRESSURE: 66 MMHG | OXYGEN SATURATION: 96 % | TEMPERATURE: 97.8 F | WEIGHT: 138 LBS | RESPIRATION RATE: 16 BRPM | BODY MASS INDEX: 27.09 KG/M2

## 2020-12-02 DIAGNOSIS — J01.10 ACUTE NON-RECURRENT FRONTAL SINUSITIS: Primary | ICD-10-CM

## 2020-12-02 PROCEDURE — G0382 LEV 3 HOSP TYPE B ED VISIT: HCPCS | Performed by: PHYSICIAN ASSISTANT

## 2020-12-02 RX ORDER — AMOXICILLIN AND CLAVULANATE POTASSIUM 875; 125 MG/1; MG/1
1 TABLET, FILM COATED ORAL EVERY 12 HOURS SCHEDULED
Qty: 14 TABLET | Refills: 0 | Status: SHIPPED | OUTPATIENT
Start: 2020-12-02 | End: 2020-12-09

## 2020-12-17 ENCOUNTER — HOSPITAL ENCOUNTER (OUTPATIENT)
Dept: RADIOLOGY | Facility: MEDICAL CENTER | Age: 60
Discharge: HOME | End: 2020-12-17
Payer: COMMERCIAL

## 2020-12-17 ENCOUNTER — TELEPHONE (OUTPATIENT)
Dept: HEMATOLOGY ONCOLOGY | Facility: CLINIC | Age: 60
End: 2020-12-17

## 2020-12-17 VITALS — WEIGHT: 138 LBS | BODY MASS INDEX: 27.09 KG/M2 | HEIGHT: 60 IN

## 2020-12-17 DIAGNOSIS — Z12.31 VISIT FOR SCREENING MAMMOGRAM: ICD-10-CM

## 2020-12-17 DIAGNOSIS — Z12.31 SCREENING MAMMOGRAM, ENCOUNTER FOR: ICD-10-CM

## 2020-12-17 PROCEDURE — 77067 SCR MAMMO BI INCL CAD: CPT

## 2020-12-17 PROCEDURE — 77063 BREAST TOMOSYNTHESIS BI: CPT

## 2020-12-21 ENCOUNTER — TELEPHONE (OUTPATIENT)
Dept: SURGICAL ONCOLOGY | Facility: CLINIC | Age: 60
End: 2020-12-21

## 2020-12-22 ENCOUNTER — PATIENT OUTREACH (OUTPATIENT)
Dept: CASE MANAGEMENT | Facility: HOSPITAL | Age: 60
End: 2020-12-22

## 2020-12-22 ENCOUNTER — OFFICE VISIT (OUTPATIENT)
Dept: SURGICAL ONCOLOGY | Facility: CLINIC | Age: 60
End: 2020-12-22
Payer: COMMERCIAL

## 2020-12-22 VITALS
BODY MASS INDEX: 27.48 KG/M2 | SYSTOLIC BLOOD PRESSURE: 104 MMHG | HEIGHT: 60 IN | WEIGHT: 140 LBS | HEART RATE: 59 BPM | TEMPERATURE: 97.1 F | DIASTOLIC BLOOD PRESSURE: 70 MMHG | RESPIRATION RATE: 16 BRPM

## 2020-12-22 DIAGNOSIS — Z78.9 NEED FOR FOLLOW-UP BY SOCIAL WORKER: ICD-10-CM

## 2020-12-22 DIAGNOSIS — Z08 ENCOUNTER FOR FOLLOW-UP EXAMINATION AFTER COMPLETED TREATMENT FOR MALIGNANT NEOPLASM: Primary | ICD-10-CM

## 2020-12-22 DIAGNOSIS — Z12.31 VISIT FOR SCREENING MAMMOGRAM: ICD-10-CM

## 2020-12-22 DIAGNOSIS — Z86.000 HISTORY OF DUCTAL CARCINOMA IN SITU (DCIS) OF BREAST: ICD-10-CM

## 2020-12-22 PROCEDURE — 99213 OFFICE O/P EST LOW 20 MIN: CPT | Performed by: NURSE PRACTITIONER

## 2020-12-30 ENCOUNTER — PATIENT OUTREACH (OUTPATIENT)
Dept: CASE MANAGEMENT | Facility: HOSPITAL | Age: 60
End: 2020-12-30

## 2021-01-02 ENCOUNTER — IMMUNIZATIONS (OUTPATIENT)
Dept: FAMILY MEDICINE CLINIC | Facility: HOSPITAL | Age: 61
End: 2021-01-02

## 2021-01-02 DIAGNOSIS — Z23 ENCOUNTER FOR IMMUNIZATION: ICD-10-CM

## 2021-01-02 PROCEDURE — 0011A SARS-COV-2 / COVID-19 MRNA VACCINE (MODERNA) 100 MCG: CPT

## 2021-01-02 PROCEDURE — 91301 SARS-COV-2 / COVID-19 MRNA VACCINE (MODERNA) 100 MCG: CPT

## 2021-01-12 ENCOUNTER — TELEPHONE (OUTPATIENT)
Dept: FAMILY MEDICINE CLINIC | Facility: CLINIC | Age: 61
End: 2021-01-12

## 2021-01-12 NOTE — TELEPHONE ENCOUNTER
Called pt in regards to her appt on Fri with Dr Lore Lerma  The pt is scheduled for eye surgery  I was calling to follow up to see if she had paperwork from eye dr and who she is seeing for the surgery  KONG/HANS Shea

## 2021-01-12 NOTE — TELEPHONE ENCOUNTER
Kirsten,  Patient called back and she said she has a packet of paperwork to be filled out from the eye surgeon    The eye surgeon is Dr Audrey Jaimes

## 2021-01-13 ENCOUNTER — TELEPHONE (OUTPATIENT)
Dept: ADMINISTRATIVE | Facility: OTHER | Age: 61
End: 2021-01-13

## 2021-01-13 PROBLEM — Z98.84 STATUS POST GASTRIC BYPASS FOR OBESITY: Status: ACTIVE | Noted: 2021-01-13

## 2021-01-13 PROBLEM — Z08 ENCOUNTER FOR FOLLOW-UP EXAMINATION AFTER COMPLETED TREATMENT FOR MALIGNANT NEOPLASM: Status: RESOLVED | Noted: 2020-12-22 | Resolved: 2021-01-13

## 2021-01-13 PROBLEM — F41.8 MIXED ANXIETY AND DEPRESSIVE DISORDER: Status: ACTIVE | Noted: 2020-04-13

## 2021-01-13 PROBLEM — Z01.419 ENCOUNTER FOR GYNECOLOGICAL EXAMINATION WITHOUT ABNORMAL FINDING: Status: RESOLVED | Noted: 2020-02-18 | Resolved: 2021-01-13

## 2021-01-13 NOTE — TELEPHONE ENCOUNTER
----- Message from [email or web address] sent at 1/12/2021  2:15 PM EST -----  Regarding: mammogram  11/19/20 9:19 AM    Hello, our patient No patient name on file  has had Mammogram completed/performed  Please assist in updating the patient chart by pulling a previous Electronic Medical Record (EMR) document  The previous EMR is [employer]   The date of service is 2018    Thank you,  Faheem Cherry

## 2021-01-13 NOTE — TELEPHONE ENCOUNTER
Upon review of the In Basket request we were able to locate a mammogram more up to date from 2020 on HM    Any additional questions or concerns should be emailed to the Practice Liaisons via [email or web address] email, please do not reply via In Basket      Thank you  Tabby Deluca, [address] [place]

## 2021-01-14 PROBLEM — Z01.818 PRE-OPERATIVE GENERAL PHYSICAL EXAMINATION: Status: ACTIVE | Noted: 2021-01-14

## 2021-01-15 ENCOUNTER — OFFICE VISIT (OUTPATIENT)
Dept: FAMILY MEDICINE CLINIC | Facility: CLINIC | Age: 61
End: 2021-01-15

## 2021-01-15 VITALS
RESPIRATION RATE: 16 BRPM | OXYGEN SATURATION: 96 % | DIASTOLIC BLOOD PRESSURE: 70 MMHG | TEMPERATURE: 97.9 F | HEIGHT: 60 IN | BODY MASS INDEX: 27.61 KG/M2 | WEIGHT: 140.6 LBS | HEART RATE: 67 BPM | SYSTOLIC BLOOD PRESSURE: 104 MMHG

## 2021-01-15 DIAGNOSIS — Z98.84 STATUS POST GASTRIC BYPASS FOR OBESITY: ICD-10-CM

## 2021-01-15 DIAGNOSIS — Z01.818 PRE-OPERATIVE GENERAL PHYSICAL EXAMINATION: Primary | ICD-10-CM

## 2021-01-15 DIAGNOSIS — Z13.29 SCREENING FOR THYROID DISORDER: ICD-10-CM

## 2021-01-15 DIAGNOSIS — Z13.6 SCREENING FOR CARDIOVASCULAR CONDITION: ICD-10-CM

## 2021-01-15 DIAGNOSIS — H02.403 PTOSIS OF EYELID, BILATERAL: ICD-10-CM

## 2021-01-15 DIAGNOSIS — Z13.1 SCREENING FOR DIABETES MELLITUS: ICD-10-CM

## 2021-01-15 PROCEDURE — PREOP: Performed by: FAMILY MEDICINE

## 2021-01-15 RX ORDER — ALBUTEROL SULFATE 90 UG/1
2 AEROSOL, METERED RESPIRATORY (INHALATION) AS NEEDED
COMMUNITY
End: 2021-07-07

## 2021-01-15 NOTE — PROGRESS NOTES
PRE-OPERATIVE EVALUATION  [facility] - [facility] MEDICINE           Assessment/Plan:      63 y o  female with planned surgery as above was seen for preoperative risk reduction  Assessments below yield the following risks  Approved for surgery with the following risk assessments and risk reduction recommendations  The patient has the following known anesthesia issues: none  Patient has a bleeding risk of: no      Patient is low risk for surgery  The surgery is considered a low risk procedure  Patient's functional capacity is excellent  Patient does have known FREIDA and is on CPAP    Cardiac Risk Estimation: per the Revised Cardiac Risk Index:   Assessment of Cardiac Risk:  · Denies unstable or severe angina or MI in the last 6 weeks or history of stent placement in the last year   · Denies decompensated heart failure (e g  New onset heart failure, NYHA functional class IV heart failure, or worsening existing heart failure)  · Denies significant arrhythmias such as high grade AV block, symptomatic ventricular arrhythmia, newly recognized ventricular tachycardia, supraventricular tachycardia with resting heart rate >100, or symptomatic bradycardia  · Denies severe heart valve disease including aortic stenosis or symptomatic mitral stenosis    Exercise Capacity:  · Able to walk 4 blocks without symptoms?: Yes  · Able to walk 2 flights without symptoms?: Yes    Prior Anesthesia Reactions: No     Personal history of venous thromboembolic disease?  No    Lab Review: see flowsheet  No results found for: NA, SODIUM, K, CL, CO2, ANIONGAP, AGAP, BUN, CREATININE, GLUC, GLUF, CALCIUM, AST, ALT, ALKPHOS, PROT, TP, BILITOT, TBILI, EGFR  No results found for: WBC, HGB, HCT, MCV, PLT  No results found for: INR, PROTIME    Cardiographics  ECG: not indicated     Imaging  Chest x-ray: not indicated     Current medications which may produce withdrawal symptoms if withheld perioperatively: n/a  Plan:      Problem List Items Addressed This Visit        Other    Status post gastric bypass for obesity    Relevant Orders    CBC and differential    Vitamin D 25 hydroxy    Vitamin B12    Pre-operative general physical examination - Primary  H&P form completed as requested by ophthalmology  Will fax and scan copy into chart  Other Visit Diagnoses     Ptosis of eyelid, bilateral        Screening for cardiovascular condition        Relevant Orders    Lipid panel    Screening for thyroid disorder        Relevant Orders    TSH, 3rd generation with Free T4 reflex    Screening for diabetes mellitus        Relevant Orders    Comprehensive metabolic panel           1  Preoperative workup as follows as above  2      Change in medication regimen before surgery: none, continue medication regimen including morning of surgery, with sip of water  3      Preoperative Anticoagulation bridging therapy recommended: per surgeon if appropriate  4      Deep vein thrombosis prophylaxis postoperatively:regimen to be chosen by surgical team if appropriate  5      Diabetes management recommendations: n/a          Subjective: The following preoperative consultation was requested by the performing surgeon for risk review and reduction  A copy of this consultation will be provided to the requesting surgeon upon completion of the encounter      HPI  Chief Complaint   Patient presents with    Pre-op Exam     B/L eye surgery-upper eye lid surgery-scheduled 01/26/2020 local block          Maggie Barber is a 63 y o  female who presents to the office today at the request of  Dr Romana Redhead, who plans on performing bilateral blepharoplasty  This visit is requested for preoperative risk reduction related to the following medical conditions: FREIDA, mild intermittent asthma, anxiety, see PMH  The procedure is scheduled on 1/26/21  Jensen Cortes Planned anesthesia is local and brief moderate sedation        The patient reports decreased vision, resulting in the need for the proposed surgical intervention  The following portions of the patient's history were reviewed and updated as appropriate: allergies, current medications, past family history, past medical history, past social history, past surgical history and problem list      Review of Systems   ROS:  reports decreased vision in both eyes  all other systems negative - no chest pain, SOB, normal urine and bowels  no GERD  sleeping well  mood good  Objective:    /70 (BP Location: Left arm, Patient Position: Sitting, Cuff Size: Standard)   Pulse 67   Temp 97 9 °F (36 6 °C) (Temporal)   Resp 16   Ht 5' (1 524 m)   Wt 63 8 kg (140 lb 9 6 oz)   SpO2 96%   BMI 27 46 kg/m²   Physical Exam  Vitals signs and nursing note reviewed  Constitutional:       General: She is not in acute distress  Appearance: Normal appearance  She is not ill-appearing, toxic-appearing or diaphoretic  HENT:      Head: Normocephalic and atraumatic  Right Ear: Tympanic membrane normal       Left Ear: Tympanic membrane normal       Nose: Nose normal       Mouth/Throat:      Mouth: Mucous membranes are moist       Pharynx: No oropharyngeal exudate or posterior oropharyngeal erythema  Eyes:      Extraocular Movements: Extraocular movements intact  Conjunctiva/sclera: Conjunctivae normal       Pupils: Pupils are equal, round, and reactive to light  Comments: Bilateral upper lids with ptosis   Neck:      Musculoskeletal: Normal range of motion  Comments: No thyromegaly  Cardiovascular:      Rate and Rhythm: Normal rate and regular rhythm  Heart sounds: No murmur  Pulmonary:      Effort: Pulmonary effort is normal  No respiratory distress  Abdominal:      General: Bowel sounds are normal       Palpations: Abdomen is soft  There is no mass  Tenderness: There is no abdominal tenderness  Musculoskeletal: Normal range of motion        Right lower leg: No edema  Left lower leg: No edema  Lymphadenopathy:      Cervical: No cervical adenopathy  Skin:     General: Skin is warm and dry  Findings: No rash  Neurological:      General: No focal deficit present  Mental Status: She is alert  Deep Tendon Reflexes: Reflexes normal    Psychiatric:         Mood and Affect: Mood normal         Constitutional:  she appears well-developed and well-nourished  HENT: Head: Normocephalic  Neck: Neck supple  Cardiovascular: Normal rate, regular rhythm and normal heart sounds  Pulmonary/Chest: Effort normal and breath sounds normal    Abdominal: Soft  Bowel sounds are normal  There is no tenderness  Musculoskeletal: she exhibits no edema  Lymphadenopathy: she has no cervical adenopathy  Neurological: she is alert and oriented to person, place, and time  Skin: Skin is warm and dry  Psychiatric: she has a normal mood and affect  her behavior is normal  Thought content normal            There are no Patient Instructions on file for this visit  Remember do not take any NSAID's for 7 - 14  days prior to surgery, depending on your surgeon's advice  These include Advil, Motrin, Aleve  You make take Tylenol if you need it  Also hold your over-the-counter supplements, especially fish oil and vitamin E, as these can also thin your blood  Clearance  Patient is CLEARED for surgery without any additional cardiac testing  Angel Peters DO  [facility]  [address] [facility]    [address]  Phone#  [phone]  Fax#  [phone]    BMI Counseling: Body mass index is 27 46 kg/m²  The BMI is above normal  Nutrition recommendations include encouraging healthy choices of fruits and vegetables  Exercise recommendations include exercising 3-5 times per week  No pharmacotherapy was ordered

## 2021-01-18 ENCOUNTER — LAB (OUTPATIENT)
Dept: LAB | Age: 61
End: 2021-01-18
Payer: COMMERCIAL

## 2021-01-18 ENCOUNTER — TELEPHONE (OUTPATIENT)
Dept: ADMINISTRATIVE | Facility: OTHER | Age: 61
End: 2021-01-18

## 2021-01-18 DIAGNOSIS — Z13.1 SCREENING FOR DIABETES MELLITUS: ICD-10-CM

## 2021-01-18 DIAGNOSIS — Z13.6 SCREENING FOR CARDIOVASCULAR CONDITION: ICD-10-CM

## 2021-01-18 DIAGNOSIS — Z98.84 STATUS POST GASTRIC BYPASS FOR OBESITY: ICD-10-CM

## 2021-01-18 DIAGNOSIS — Z13.29 SCREENING FOR THYROID DISORDER: ICD-10-CM

## 2021-01-18 LAB
25(OH)D3 SERPL-MCNC: 27.7 NG/ML (ref 30–100)
ALBUMIN SERPL BCP-MCNC: 4 G/DL (ref 3.5–5)
ALP SERPL-CCNC: 93 U/L (ref 46–116)
ALT SERPL W P-5'-P-CCNC: 34 U/L (ref 12–78)
ANION GAP SERPL CALCULATED.3IONS-SCNC: 3 MMOL/L (ref 4–13)
AST SERPL W P-5'-P-CCNC: 21 U/L (ref 5–45)
BASOPHILS # BLD AUTO: 0.01 THOUSANDS/ΜL (ref 0–0.1)
BASOPHILS NFR BLD AUTO: 0 % (ref 0–1)
BILIRUB SERPL-MCNC: 0.61 MG/DL (ref 0.2–1)
BUN SERPL-MCNC: 14 MG/DL (ref 5–25)
CALCIUM SERPL-MCNC: 9.8 MG/DL (ref 8.3–10.1)
CHLORIDE SERPL-SCNC: 106 MMOL/L (ref 100–108)
CHOLEST SERPL-MCNC: 203 MG/DL (ref 50–200)
CO2 SERPL-SCNC: 30 MMOL/L (ref 21–32)
CREAT SERPL-MCNC: 0.7 MG/DL (ref 0.6–1.3)
EOSINOPHIL # BLD AUTO: 0.07 THOUSAND/ΜL (ref 0–0.61)
EOSINOPHIL NFR BLD AUTO: 1 % (ref 0–6)
ERYTHROCYTE [DISTWIDTH] IN BLOOD BY AUTOMATED COUNT: 12.4 % (ref 11.6–15.1)
GFR SERPL CREATININE-BSD FRML MDRD: 94 ML/MIN/1.73SQ M
GLUCOSE P FAST SERPL-MCNC: 82 MG/DL (ref 65–99)
HCT VFR BLD AUTO: 40.7 % (ref 34.8–46.1)
HDLC SERPL-MCNC: 69 MG/DL
HGB BLD-MCNC: 13.3 G/DL (ref 11.5–15.4)
IMM GRANULOCYTES # BLD AUTO: 0.02 THOUSAND/UL (ref 0–0.2)
IMM GRANULOCYTES NFR BLD AUTO: 0 % (ref 0–2)
LDLC SERPL CALC-MCNC: 117 MG/DL (ref 0–100)
LYMPHOCYTES # BLD AUTO: 1.68 THOUSANDS/ΜL (ref 0.6–4.47)
LYMPHOCYTES NFR BLD AUTO: 29 % (ref 14–44)
MCH RBC QN AUTO: 30.8 PG (ref 26.8–34.3)
MCHC RBC AUTO-ENTMCNC: 32.7 G/DL (ref 31.4–37.4)
MCV RBC AUTO: 94 FL (ref 82–98)
MONOCYTES # BLD AUTO: 0.44 THOUSAND/ΜL (ref 0.17–1.22)
MONOCYTES NFR BLD AUTO: 8 % (ref 4–12)
NEUTROPHILS # BLD AUTO: 3.55 THOUSANDS/ΜL (ref 1.85–7.62)
NEUTS SEG NFR BLD AUTO: 62 % (ref 43–75)
NONHDLC SERPL-MCNC: 134 MG/DL
NRBC BLD AUTO-RTO: 0 /100 WBCS
PLATELET # BLD AUTO: 201 THOUSANDS/UL (ref 149–390)
PMV BLD AUTO: 10.4 FL (ref 8.9–12.7)
POTASSIUM SERPL-SCNC: 4.2 MMOL/L (ref 3.5–5.3)
PROT SERPL-MCNC: 7.4 G/DL (ref 6.4–8.2)
RBC # BLD AUTO: 4.32 MILLION/UL (ref 3.81–5.12)
SODIUM SERPL-SCNC: 139 MMOL/L (ref 136–145)
TRIGL SERPL-MCNC: 87 MG/DL
TSH SERPL DL<=0.05 MIU/L-ACNC: 2.59 UIU/ML (ref 0.36–3.74)
VIT B12 SERPL-MCNC: 149 PG/ML (ref 100–900)
WBC # BLD AUTO: 5.77 THOUSAND/UL (ref 4.31–10.16)

## 2021-01-18 PROCEDURE — 85025 COMPLETE CBC W/AUTO DIFF WBC: CPT

## 2021-01-18 PROCEDURE — 80061 LIPID PANEL: CPT

## 2021-01-18 PROCEDURE — 84443 ASSAY THYROID STIM HORMONE: CPT

## 2021-01-18 PROCEDURE — 80053 COMPREHEN METABOLIC PANEL: CPT

## 2021-01-18 PROCEDURE — 82607 VITAMIN B-12: CPT

## 2021-01-18 PROCEDURE — 36415 COLL VENOUS BLD VENIPUNCTURE: CPT

## 2021-01-18 PROCEDURE — 82306 VITAMIN D 25 HYDROXY: CPT

## 2021-01-18 NOTE — TELEPHONE ENCOUNTER
Upon review of the In Basket request and the patient's chart, initial outreach has been made via fax, please see Contacts section for details  Thank you  JAMES Oropeza, [place] [phone] Fax: [phone]

## 2021-01-18 NOTE — TELEPHONE ENCOUNTER
----- Message from [facility] sent at 1/15/2021  7:58 AM EST -----  Regarding: colonoscopy  11/19/20 9:19 AM    Hello, our patient No patient name on file  has had CRC: Colonoscopy completed/performed   Please assist in updating the patient chart by making an External outreach to Dr Chary Pena facility located in [place] The date of service is 2016    Thank you,  Faheem Cherry

## 2021-01-18 NOTE — LETTER
Procedure Request Form: Colonoscopy      Date Requested: 21  Patient: Marisela Zhu  Patient : 1960   Referring Provider: Spring Arbor Cuff, DO        Date of Procedure ____ or most recent colo report________________________       The above patient has informed us that they have completed their   most recent Colonoscopy at your facility  Please complete   this form and attach all corresponding procedure reports/results  Comments __________________________________________________________  ____________________________________________________________________  ____________________________________________________________________  ____________________________________________________________________    Facility Completing Procedure _________________________________________    Form Completed By (print name) _______________________________________      Signature __________________________________________________________      These reports are needed for  compliance    Please fax this completed form and a copy of the procedure report to our office located at [place] [address] as soon as possible to [phone] gabe Coates: Phone [phone]    We thank you for your assistance in treating our mutual patient

## 2021-01-19 NOTE — TELEPHONE ENCOUNTER
Upon review of the In Basket request we were able to locate, review, and update the patient chart as requested for CRC: Colonoscopy  Any additional questions or concerns should be emailed to the Practice Liaisons via [email or web address] email, please do not reply via In Basket      Thank you  Radha Comment, [address]

## 2021-01-21 DIAGNOSIS — E55.9 VITAMIN D DEFICIENCY: Primary | ICD-10-CM

## 2021-01-30 ENCOUNTER — IMMUNIZATIONS (OUTPATIENT)
Dept: FAMILY MEDICINE CLINIC | Facility: HOSPITAL | Age: 61
End: 2021-01-30

## 2021-01-30 DIAGNOSIS — Z23 ENCOUNTER FOR IMMUNIZATION: Primary | ICD-10-CM

## 2021-01-30 PROCEDURE — 91301 SARS-COV-2 / COVID-19 MRNA VACCINE (MODERNA) 100 MCG: CPT

## 2021-01-30 PROCEDURE — 0012A SARS-COV-2 / COVID-19 MRNA VACCINE (MODERNA) 100 MCG: CPT

## 2021-02-19 ENCOUNTER — TELEPHONE (OUTPATIENT)
Dept: OTHER | Facility: OTHER | Age: 61
End: 2021-02-19

## 2021-02-19 ENCOUNTER — OFFICE VISIT (OUTPATIENT)
Dept: URGENT CARE | Age: 61
End: 2021-02-19
Payer: COMMERCIAL

## 2021-02-19 ENCOUNTER — APPOINTMENT (OUTPATIENT)
Dept: RADIOLOGY | Age: 61
End: 2021-02-19
Payer: COMMERCIAL

## 2021-02-19 ENCOUNTER — TRANSCRIBE ORDERS (OUTPATIENT)
Dept: ADMINISTRATIVE | Age: 61
End: 2021-02-19

## 2021-02-19 VITALS
SYSTOLIC BLOOD PRESSURE: 113 MMHG | HEART RATE: 67 BPM | TEMPERATURE: 97.5 F | DIASTOLIC BLOOD PRESSURE: 64 MMHG | RESPIRATION RATE: 18 BRPM | OXYGEN SATURATION: 98 %
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
## 2021-02-19 DIAGNOSIS — S52.501A CLOSED FRACTURE OF DISTAL END OF RIGHT RADIUS, UNSPECIFIED FRACTURE MORPHOLOGY, INITIAL ENCOUNTER: Primary | ICD-10-CM

## 2021-02-19 PROCEDURE — 73110 X-RAY EXAM OF WRIST: CPT

## 2021-02-19 PROCEDURE — G0382 LEV 3 HOSP TYPE B ED VISIT: HCPCS | Performed by: PHYSICIAN ASSISTANT

## 2021-02-19 PROCEDURE — 29125 APPL SHORT ARM SPLINT STATIC: CPT | Performed by: PHYSICIAN ASSISTANT

## 2021-02-19 PROCEDURE — 73130 X-RAY EXAM OF HAND: CPT

## 2021-02-19 NOTE — PATIENT INSTRUCTIONS
RICE- rest, ice, compression, elevation  Keep in splint until seen by Ortho  Call tomorrow for official xray results   Call Ortho today and follow-up with them in the next 1-2 days for further evaluation and treatment     Call Alessandra Sultana to schedule an appointment: [phone]  Or the direct Ortho number at [phone] to schedule an appointment   Go to the ER immediately if any numbness, tingling, weakness, change in intensity or location of pain, arm pain or swelling, other new or concerning symptoms

## 2021-02-19 NOTE — PROGRESS NOTES
[facility]        NAME: Marisela Zhu is a 63 y o  female  : 1960    MRN: [id number]  DATE: 2021  TIME: 1:49 PM    Assessment and Plan   Closed fracture of distal end of right radius, unspecified fracture morphology, initial encounter [S52 501A]  1  Closed fracture of distal end of right radius, unspecified fracture morphology, initial encounter  Ambulatory referral to Orthopedic Surgery   2  Fall, initial encounter  XR wrist 3+ vw right    XR hand 3+ vw right     Xray- appears to be a distal radius fracture, does not appear to be displaced, pending final read  Discussed options with patient- she would like appt for Ortho and splint  Monday appt was scheduled  She will call to see if Ortho can see her at a different location sooner  Volar static Splint- placed by medical staff for comfort, NV intact post-splinting    Patient Instructions     RICE- rest, ice, compression, elevation  Keep in splint until seen by Ortho  Call tomorrow for official xray results   Call Ortho today and follow-up with them in the next 1-2 days for further evaluation and treatment  Call Jarett Elias to schedule an appointment: [phone]  Or the direct Ortho number at [phone] to schedule an appointment   Go to the ER immediately if any numbness, tingling, weakness, change in intensity or location of pain, arm pain or swelling, other new or concerning symptoms    Chief Complaint     Chief Complaint   Patient presents with    Wrist Injury     right     Fall     fell off step stool, on right side with rt  arm out  x yesterday, denies LOC          History of Present Illness       77-year-old female presents with right wrist pain after an injury that occurred last night  States she fell catching herself with her right wrist   States it was a mechanical fall  She denies hitting her head or losing consciousness    States he noticed some bruising to the right wrist and some mild swelling in the morning  States he tried ice, Tylenol and elevation as well as wrapped it with significant relief  States pain is worse with palpation and movement, better at rest      She denies any radiation of pain  Denies any numbness, tingling, weakness, abrasions/lacerations or other complaints  She is right-hand dominant  Review of Systems   Review of Systems   Constitutional: Negative for chills, fatigue and fever  HENT: Negative  Respiratory: Negative for shortness of breath  Cardiovascular: Negative for chest pain  Gastrointestinal: Negative  Musculoskeletal: Positive for arthralgias and joint swelling  Negative for back pain and neck pain  Skin: Negative for rash and wound  Neurological: Negative for dizziness, syncope, weakness, numbness and headaches  All other systems reviewed and are negative  Current Medications       Current Outpatient Medications:     albuterol (ProAir HFA) 90 mcg/act inhaler, ProAir HFA 90 mcg/actuation aerosol inhaler  Inhale 2 puffs every 4 hours by inhalation route for 30 days  , Disp: , Rfl:     Cholecalciferol (VITAMIN D) 2000 units CAPS, Take by mouth daily, Disp: , Rfl:     Multiple Vitamin (MULTI-VITAMIN DAILY) TABS, Take by mouth, Disp: , Rfl:     sertraline (ZOLOFT) 50 mg tablet, Take 50 mg by mouth 2 (two) times a day , Disp: , Rfl:     Current Allergies     Allergies as of 02/19/2021    (No Known Allergies)            The following portions of the patient's history were reviewed and updated as appropriate: allergies, current medications, past family history, past medical history, past social history, past surgical history and problem list      Past Medical History:   Diagnosis Date    Anxiety     Arthritis     Asthma     Breast cancer ([facility] [address] ) 01/01/2012    Closed fracture of radial styloid     Endometriosis     Forceps delivery     1991 daughter    GERD (gastroesophageal reflux disease)     H/O mitral valve prolapse     no regurgitation    Hiatal hernia     History of radiation therapy     Hyperlipidemia     Infertility, female     Normal delivery     1998 daughter    Sleep apnea     on cpap    TB lung, latent     treated with INH (in her 29's)       Past Surgical History:   Procedure Laterality Date    BREAST BIOPSY Right 04/01/2012    biopsy breast percutaneous needle core    BREAST LUMPECTOMY Right 08/01/2012    CHOLECYSTECTOMY  01/23/2013    DILATION AND CURETTAGE OF UTERUS      ENDOMETRIAL BIOPSY      without cervical dilation    LAPAROSCOPY      Exploratory 1990 & 1994    SALUD-EN-Y PROCEDURE  01/23/2013    Dr Pacheco Johns       Family History   Problem Relation Age of Onset    Colon cancer Mother 48    Stroke Father     No Known Problems Maternal Grandmother     Hypertension Family     Mitral valve prolapse Family     Osteoporosis Family     Varicose Veins Family     No Known Problems Sister     No Known Problems Daughter     No Known Problems Paternal Grandmother     No Known Problems Daughter     No Known Problems Paternal Aunt     Colon cancer Maternal Grandfather     No Known Problems Paternal Grandfather          Medications have been verified  Objective   /64   Pulse 67   Temp 97 5 °F (36 4 °C)   Resp 18   SpO2 98%        Physical Exam     Physical Exam  Vitals signs and nursing note reviewed  Constitutional:       General: She is not in acute distress  Appearance: Normal appearance  She is well-developed  She is not toxic-appearing  Cardiovascular:      Rate and Rhythm: Normal rate and regular rhythm  Heart sounds: Normal heart sounds  Pulmonary:      Effort: Pulmonary effort is normal       Breath sounds: Normal breath sounds  Musculoskeletal:      Right elbow: Normal She exhibits normal range of motion  No tenderness found  Right wrist: She exhibits tenderness and swelling  She exhibits normal range of motion        Right forearm: Normal  She exhibits no tenderness and no swelling  Arms:       Right hand: She exhibits tenderness  She exhibits normal range of motion, normal capillary refill, no deformity, no laceration and no swelling  Normal sensation noted  Normal strength noted  Comments: 5/ 5  strength  DIP/PIP flexion tendon intact  Full extension of the fingers  Skin:     Capillary Refill: Capillary refill takes less than 2 seconds  Neurological:      General: No focal deficit present  Mental Status: She is alert and oriented to person, place, and time  Sensory: Sensation is intact  Motor: Motor function is intact  Deep Tendon Reflexes: Reflexes are normal and symmetric  Comments: NV intact with sensation and strong peripheral pulses  5/5 strength of UE bilaterally   Psychiatric:         Behavior: Behavior normal            Splint application    Date/Time: 2/19/2021 1:52 PM  Performed by: Nestor Garcia RN  Authorized by: Steffanie Glynn PA-C   Universal Protocol:  Consent: Verbal consent obtained  Risks and benefits: risks, benefits and alternatives were discussed  Consent given by: patient  Patient understanding: patient states understanding of the procedure being performed  Patient consent: the patient's understanding of the procedure matches consent given  Patient identity confirmed: verbally with patient      Pre-procedure details:     Sensation:  Normal  Procedure details:     Laterality:  Right    Location:  Wrist    Wrist:  R wrist    Splint type:  Volar short arm and short arm splint, static (forearm to hand)    Supplies:  Cotton padding, elastic bandage, Ortho-Glass and skin protective strip  Post-procedure details:     Pain:  Improved    Sensation:  Normal    Patient tolerance of procedure: Tolerated well, no immediate complications  Comments:      Neurovascularly intact post procedure    Felt comfortable in the splint

## 2021-02-19 NOTE — TELEPHONE ENCOUNTER
Patient calling due to her falling yesterday and she wants to know if   can give a script for xray of right wrist

## 2021-02-22 ENCOUNTER — HOSPITAL ENCOUNTER (OUTPATIENT)
Dept: RADIOLOGY | Facility: HOSPITAL | Age: 61
Discharge: HOME | End: 2021-02-22
Payer: COMMERCIAL

## 2021-02-22 ENCOUNTER — OFFICE VISIT (OUTPATIENT)
Dept: OBGYN CLINIC | Facility: HOSPITAL | Age: 61
End: 2021-02-22
Payer: COMMERCIAL

## 2021-02-22 VITALS
SYSTOLIC BLOOD PRESSURE: 150 MMHG | HEIGHT: 60 IN | DIASTOLIC BLOOD PRESSURE: 86 MMHG | WEIGHT: 146.6 LBS | HEART RATE: 67 BPM | BODY MASS INDEX: 28.78 KG/M2

## 2021-02-22 DIAGNOSIS — S52.501A CLOSED FRACTURE OF DISTAL END OF RIGHT RADIUS, UNSPECIFIED FRACTURE MORPHOLOGY, INITIAL ENCOUNTER: ICD-10-CM

## 2021-02-22 DIAGNOSIS — S52.501A CLOSED FRACTURE OF DISTAL END OF RIGHT RADIUS, UNSPECIFIED FRACTURE MORPHOLOGY, INITIAL ENCOUNTER: Primary | ICD-10-CM

## 2021-02-22 PROCEDURE — 73110 X-RAY EXAM OF WRIST: CPT

## 2021-02-22 PROCEDURE — 99203 OFFICE O/P NEW LOW 30 MIN: CPT | Performed by: PHYSICIAN ASSISTANT

## 2021-02-22 PROCEDURE — 29075 APPL CST ELBW FNGR SHORT ARM: CPT | Performed by: PHYSICIAN ASSISTANT

## 2021-02-22 NOTE — PATIENT INSTRUCTIONS
Cast Care   WHAT YOU NEED TO KNOW:   Cast care will help the cast dry and harden correctly, and then protect it until it comes off  Your cast may need up to 48 hours to dry and harden completely  Even after your cast hardens, it can be damaged  DISCHARGE INSTRUCTIONS:   Return to the emergency department if:   · Your cast breaks or gets damaged  · You see drainage, or your cast is stained or smells bad  · Your skin turns blue or pale  · Your skin tingles, burns, or is cold or numb  · You have severe pain that is getting worse and does not go away after you take pain medicine  · Your limb swells, or your cast looks or feels tighter than it was before  Contact your healthcare provider if:   · Something falls into your cast and gets stuck  · You have itching, pain, burning, or weakness where you have the cast      · You have a fever  · You have sores, blisters, or breaks on the skin around the edges of the cast     · You have questions or concerns about your condition or care  Follow up with your healthcare provider as directed: You will need to return to have your cast removed and your bones checked  Write down your questions so you remember to ask them during your visits  Care for your cast while it hardens:   · Protect the cast   Do not put weight on the cast  Do not bend, lean on, or hit the cast with anything  Use the palms of your hands when you move the cast  Do not use your fingers  Your fingers may leave marks on the cast as it dries  · Change positions often  Change your position every 2 hours to help the cast dry faster  Prop your cast on something soft, such as a pillow, to prevent a flat area on your cast      · Keep the cast dry  Tie plastic trash bags around your cast to keep it dry while you bathe  You may use a blow dryer on cool or the lowest heat setting to dry your cast if it gets wet  Do not use a high heat setting, because you may burn your skin   Certain casts can get wet  Ask if you have a waterproof cast     Care for your cast after it hardens:   · Check your cast every day  Contact your healthcare provider if you notice any cracks, dents, holes, or flaking on your cast      · Keep your cast clean and dry  Cover your cast with a towel when you eat  You may have a small piece of cast that can be removed to check on incisions under your cast  Make sure the small piece of cast is kept tightly closed  If your cast gets dirty, use a mild detergent and a damp washcloth to wipe off the outside of your cast  Continue to cover your cast with trash bags to keep it dry while you bathe  · Care for the edges of your cast   Cover the cast edges to keep them smooth  Use 4 inch pieces of waterproof tape  Place one end of the tape under the inside edge of your cast and fold it over to the outside surface  Overlap tape strips until the edges are completely covered  Change the tape as directed  Do not pull or repair any of the padding from inside the cast  This could cause blisters and sores on the skin under your cast      · Keep weight off your cast   Do not let anyone push down or lean on your cast  This may cause it to break  · Do not use sharp objects  Do not use a sharp or pointed object to scratch under your cast  This may cause wounds that can get infected, or you may lose the item inside the cast  If your skin itches, blow cool air under the cast  You may also gently scratch your skin outside the cast with a cloth  © Copyright [address] Information is for End User's use only and may not be sold, redistributed or otherwise used for commercial purposes  All illustrations and images included in CareNotes® are the copyrighted property of [employer]  or [facility] Lesa Sims   The above information is an  only  It is not intended as medical advice for individual conditions or treatments   Talk to your doctor, nurse or pharmacist before following any medical regimen to see if it is safe and effective for you

## 2021-02-22 NOTE — PROGRESS NOTES
Assessment/Plan   Diagnoses and all orders for this visit:    Closed fracture of distal end of right radius, unspecified fracture morphology, initial encounter  - Short arm cast  - Follow up with Dr Eros Killian in [facility] [address] in a week          Subjective   Patient ID: Mona Xiong is a 63 y o  female  There were no vitals filed for this visit  62yo female comes in for an evaluation of her right wrist   She was injured on 2-18-21 when she fell off a step stool and landed on her outstretched arm  Xrays done in urgent care demonstrated a distal radius fracture  The pain is sharp in character, moderate in severity, pain does not radiate and is not associated with numbness          The following portions of the patient's history were reviewed and updated as appropriate: allergies, current medications, past family history, past medical history, past social history, past surgical history and problem list     Review of Systems  Ortho Exam  Past Medical History:   Diagnosis Date    Anxiety     Arthritis     Asthma     Breast cancer ([facility] [address] ) 01/01/2012    Closed fracture of radial styloid     Endometriosis     Forceps delivery     1991 daughter    GERD (gastroesophageal reflux disease)     H/O mitral valve prolapse     no regurgitation    Hiatal hernia     History of radiation therapy     Hyperlipidemia     Infertility, female     Normal delivery     1998 daughter    Sleep apnea     on cpap    TB lung, latent     treated with INH (in her 29's)     Past Surgical History:   Procedure Laterality Date    BREAST BIOPSY Right 04/01/2012    biopsy breast percutaneous needle core    BREAST LUMPECTOMY Right 08/01/2012    CHOLECYSTECTOMY  01/23/2013    DILATION AND CURETTAGE OF UTERUS      ENDOMETRIAL BIOPSY      without cervical dilation    LAPAROSCOPY      Exploratory 1990 & 1994    SALUD-EN-Y PROCEDURE  01/23/2013    Dr Vince Gray     Family History   Problem Relation Age of Onset    Colon cancer Mother 48    Stroke Father     No Known Problems Maternal Grandmother     Hypertension Family     Mitral valve prolapse Family     Osteoporosis Family     Varicose Veins Family     No Known Problems Sister     No Known Problems Daughter     No Known Problems Paternal Grandmother     No Known Problems Daughter     No Known Problems Paternal Aunt     Colon cancer Maternal Grandfather     No Known Problems Paternal Grandfather      Social History     Occupational History    Not on file   Tobacco Use    Smoking status: Never Smoker    Smokeless tobacco: Never Used   Substance and Sexual Activity    Alcohol use: Yes     Alcohol/week: 1 0 standard drinks     Types: 1 Glasses of wine per week     Frequency: Monthly or less     Drinks per session: 1 or 2    Drug use: Never    Sexual activity: Yes     Partners: Male       Review of Systems   Constitutional: Negative  HENT: Negative  Eyes: Negative  Respiratory: Negative  Cardiovascular: Negative  Gastrointestinal: Negative  Endocrine: Negative  Genitourinary: Negative  Musculoskeletal: As below      Allergic/Immunologic: Negative  Neurological: Negative  Hematological: Negative  Psychiatric/Behavioral: Negative          Objective   Physical Exam      · Constitutional: Awake, Alert, Oriented  · Eyes: EOMI  · Psych: Mood and affect appropriate  · Heart: regular rate and rhythm  · Lungs: No audible wheezing  · Abdomen: soft  · Lymph: no lymphedema   right wrist:  - Appearance   Swelling: mild, no discoloration, no deformity, no ecchymosis and no erythema  - Palpation  o + tenderness distal radius  o No tenderness to palpation of distal ulna, scaphoid, lunate, hamate, ulnar wrist,, palmar wrist, thenar eminence, hypothenar eminence, or hand   - ROM  o not examined due to fracture status  - Motor  o not examined due to fracture status  - Special Tests  o Median/ulnar/radial nerve motor intact  - NVI distally    I have personally reviewed pertinent films in PACS and my interpretation is unchanged distal radius fracture  Cast application    Date/Time: 2/22/2021 12:19 PM  Performed by: Melinda Garay PA-C  Authorized by: Melinda Garay PA-C   Universal Protocol:  Procedure performed by: Danii Cortez MA)  Risks and benefits: risks, benefits and alternatives were discussed  Consent given by: patient  Timeout called at: 2/22/2021 12:20 PM   Patient understanding: patient states understanding of the procedure being performed  Patient identity confirmed: verbally with patient      Pre-procedure details:     Sensation:  Normal  Procedure details:     Laterality:  Right    Location:  Wrist    Wrist:  R wristCast type:  Short arm    Supplies:  Fiberglass and cotton padding  Post-procedure details:     Pain:  Unchanged    Sensation:  Normal    Patient tolerance of procedure:   Tolerated well, no immediate complications

## 2021-02-24 ENCOUNTER — TELEPHONE (OUTPATIENT)
Dept: OBGYN CLINIC | Facility: HOSPITAL | Age: 61
End: 2021-02-24

## 2021-02-24 NOTE — TELEPHONE ENCOUNTER
Patient saw Solo Bourgeois  Patient is calling because her employer is requiring a note faxed over stating if she has restrictions with her wrist fracture        Please fax note to [phone] attn: Basim Knight in Audiology

## 2021-03-01 ENCOUNTER — APPOINTMENT (OUTPATIENT)
Dept: RADIOLOGY | Facility: MEDICAL CENTER | Age: 61
End: 2021-03-01
Payer: COMMERCIAL

## 2021-03-01 ENCOUNTER — OFFICE VISIT (OUTPATIENT)
Dept: OBGYN CLINIC | Facility: MEDICAL CENTER | Age: 61
End: 2021-03-01
Payer: COMMERCIAL

## 2021-03-01 VITALS
HEIGHT: 60 IN | WEIGHT: 146.6 LBS | DIASTOLIC BLOOD PRESSURE: 69 MMHG | BODY MASS INDEX: 28.78 KG/M2 | HEART RATE: 69 BPM | SYSTOLIC BLOOD PRESSURE: 111 MMHG

## 2021-03-01 DIAGNOSIS — S52.501A CLOSED FRACTURE OF DISTAL END OF RIGHT RADIUS, UNSPECIFIED FRACTURE MORPHOLOGY, INITIAL ENCOUNTER: Primary | ICD-10-CM

## 2021-03-01 DIAGNOSIS — S52.501A CLOSED FRACTURE OF DISTAL END OF RIGHT RADIUS, UNSPECIFIED FRACTURE MORPHOLOGY, INITIAL ENCOUNTER: ICD-10-CM

## 2021-03-01 PROCEDURE — 99213 OFFICE O/P EST LOW 20 MIN: CPT | Performed by: ORTHOPAEDIC SURGERY

## 2021-03-01 PROCEDURE — 73110 X-RAY EXAM OF WRIST: CPT

## 2021-03-01 NOTE — PROGRESS NOTES
CHIEF COMPLAINT:  Chief Complaint   Patient presents with    Right Wrist - Pain       SUBJECTIVE:  Marisela Zhu is a 63y o  year old female who presents  Right wrist pain  Patient was injured on 02/18/2021 when she fell off a step stool and landed on the outstretched arm  She saw Mipso Skill A-C and was placed into a short-arm cast   Today she states that she has been using her hand as tolerated for her activities of daily living  She has been avoiding heavy lifting  She denies any numbness or tingling         PAST MEDICAL HISTORY:  Past Medical History:   Diagnosis Date    Anxiety     Arthritis     Asthma     Breast cancer ([address] ) 01/01/2012    Closed fracture of radial styloid     Endometriosis     Forceps delivery     1991 daughter    GERD (gastroesophageal reflux disease)     H/O mitral valve prolapse     no regurgitation    Hiatal hernia     History of radiation therapy     Hyperlipidemia     Infertility, female     Normal delivery     1998 daughter    Sleep apnea     on cpap    TB lung, latent     treated with INH (in her 29's)       PAST SURGICAL HISTORY:  Past Surgical History:   Procedure Laterality Date    BREAST BIOPSY Right 04/01/2012    biopsy breast percutaneous needle core    BREAST LUMPECTOMY Right 08/01/2012    CHOLECYSTECTOMY  01/23/2013    DILATION AND CURETTAGE OF UTERUS      ENDOMETRIAL BIOPSY      without cervical dilation    LAPAROSCOPY      Exploratory 1990 & 1994    SALUD-EN-Y PROCEDURE  01/23/2013    Dr Mirian Edgar HISTORY:  Family History   Problem Relation Age of Onset    Colon cancer Mother 48    Stroke Father     No Known Problems Maternal Grandmother     Hypertension Family     Mitral valve prolapse Family     Osteoporosis Family     Varicose Veins Family     No Known Problems Sister     No Known Problems Daughter     No Known Problems Paternal Grandmother     No Known Problems Daughter     No Known Problems Paternal Aunt     Colon cancer Maternal Grandfather     No Known Problems Paternal Grandfather        SOCIAL HISTORY:  Social History     Tobacco Use    Smoking status: Never Smoker    Smokeless tobacco: Never Used   Substance Use Topics    Alcohol use: Yes     Alcohol/week: 1 0 standard drinks     Types: 1 Glasses of wine per week     Frequency: Monthly or less     Drinks per session: 1 or 2    Drug use: Never       MEDICATIONS:    Current Outpatient Medications:     albuterol (ProAir HFA) 90 mcg/act inhaler, ProAir HFA 90 mcg/actuation aerosol inhaler  Inhale 2 puffs every 4 hours by inhalation route for 30 days  , Disp: , Rfl:     Cholecalciferol (VITAMIN D) 2000 units CAPS, Take by mouth daily, Disp: , Rfl:     Multiple Vitamin (MULTI-VITAMIN DAILY) TABS, Take by mouth, Disp: , Rfl:     sertraline (ZOLOFT) 50 mg tablet, Take 50 mg by mouth 2 (two) times a day , Disp: , Rfl:     ALLERGIES:  No Known Allergies    REVIEW OF SYSTEMS:  Review of Systems    ROS:   General: no fever, no chills  HEENT:  No loss of hearing or eyesight problems  Eyes:  No red eyes  Respiratory:  No coughing, shortness of breath or wheezing  Cardiovascular:  No chest pain, no palpitations  GI:  Abdomen soft nontender, denies nausea  Endocrine:  No muscle weakness, no frequent urination, no excessive thirst  Urinary:  No dysuria, no incontinence  Musculoskeletal: see HPI and PE  SKIN:  No skin rash, no dry skin  Neurological:  No headaches, no confusion  Psychiatric:  No suicide thoughts, no anxiety, no depression  Review of all other systems is negative    VITALS:  Vitals:    03/01/21 1121   BP: 111/69   Pulse: 69       LABS:  HgA1c:   Lab Results   Component Value Date    HGBA1C 5 4 03/18/2020     BMP:   Lab Results   Component Value Date    CALCIUM 9 8 01/18/2021    K 4 2 01/18/2021    CO2 30 01/18/2021     01/18/2021    BUN 14 01/18/2021    CREATININE 0 70 01/18/2021       _____________________________________________________  PHYSICAL EXAMINATION:  General: well developed and well nourished, alert, oriented times 3 and appears comfortable  Psychiatric: Normal  HEENT: Trachea Midline, No torticollis  Pulmonary: No audible wheezing or strider  Cardiovascular: No discernable arrhythmia   Skin: No masses, erythema, lacerations, fluctation, ulcerations  Neurovascular: Sensation Intact to the Median, Ulnar, Radial Nerve, Motor Intact to the Median, Ulnar, Radial Nerve and Pulses Intact    MUSCULOSKELETAL EXAMINATION:    Right wrist  Cast is intact with no evidence damage  Patient is able to actively move her digits with no discomfort  Capillary refill less than 3 seconds    ___________________________________________________  STUDIES REVIEWED:  Images obtained today of the Right wrist 3 views demonstrate Cast intact with distal radius fracture noted mild tilt dorsally  PROCEDURES PERFORMED:  Procedures  No Procedures performed today    _____________________________________________________  ASSESSMENT/PLAN:    Right distal radius fracture  -  Patient was advised to avoid heavy lifting with the right upper extremity at this time   -She can continue to move her digits as necessary  -Cast precautions were given  - she can follow up in 2 weeks for re-evaluation and x-rays of the right wrist out of cast    Follow Up:  Return in about 2 weeks (around 3/15/2021)  Work/school status:   no use of right upper extremity    To Do Next Visit:  Re-evaluation of current issue, X-rays of the  right  wrist and Cast/splint off prior to x-ray      Scribe Attestation    I,:  Katerina Kumar PA-C am acting as a scribe while in the presence of the attending physician :       I,:  Agustín Sifuentes MD personally performed the services described in this documentation    as scribed in my presence :           Portions of the record may have been created with voice recognition software    Occasional wrong word or "sound a like" substitutions may have occurred due to the inherent limitations of voice recognition software  Read the chart carefully and recognize, using context, where substitutions have occurred

## 2021-03-15 ENCOUNTER — OFFICE VISIT (OUTPATIENT)
Dept: OCCUPATIONAL THERAPY | Age: 61
End: 2021-03-15
Payer: COMMERCIAL

## 2021-03-15 ENCOUNTER — OFFICE VISIT (OUTPATIENT)
Dept: OBGYN CLINIC | Facility: MEDICAL CENTER | Age: 61
End: 2021-03-15
Payer: COMMERCIAL

## 2021-03-15 ENCOUNTER — APPOINTMENT (OUTPATIENT)
Dept: RADIOLOGY | Facility: MEDICAL CENTER | Age: 61
End: 2021-03-15
Payer: COMMERCIAL

## 2021-03-15 VITALS
WEIGHT: 146.6 LBS | SYSTOLIC BLOOD PRESSURE: 126 MMHG | HEIGHT: 60 IN | DIASTOLIC BLOOD PRESSURE: 79 MMHG | HEART RATE: 67 BPM | BODY MASS INDEX: 28.78 KG/M2

## 2021-03-15 DIAGNOSIS — S52.501A CLOSED FRACTURE OF DISTAL END OF RIGHT RADIUS, UNSPECIFIED FRACTURE MORPHOLOGY, INITIAL ENCOUNTER: ICD-10-CM

## 2021-03-15 DIAGNOSIS — S52.501A CLOSED FRACTURE OF DISTAL END OF RIGHT RADIUS, UNSPECIFIED FRACTURE MORPHOLOGY, INITIAL ENCOUNTER: Primary | ICD-10-CM

## 2021-03-15 DIAGNOSIS — M25.531 RIGHT WRIST PAIN: Primary | ICD-10-CM

## 2021-03-15 PROCEDURE — L3906 WHO W/O JOINTS CF: HCPCS

## 2021-03-15 PROCEDURE — 73110 X-RAY EXAM OF WRIST: CPT

## 2021-03-15 PROCEDURE — 99213 OFFICE O/P EST LOW 20 MIN: CPT | Performed by: ORTHOPAEDIC SURGERY

## 2021-03-15 RX ORDER — SERTRALINE HYDROCHLORIDE 100 MG/1
TABLET, FILM COATED ORAL
COMMUNITY
Start: 2021-03-03 | End: 2021-05-12

## 2021-03-15 NOTE — PROGRESS NOTES
Orthosis    Diagnosis:   1  Right wrist pain       Indication: Motion Blocking    Location: Right  wrist and hand  Supplies: Custom Fit Orthotic  Orthosis type: Volar Hand-Wrist  Wearing Schedule: Remove for hygiene only  Describe Position: Wrist in slight extension    Precautions: Fracture and Universal (skin contact/breakdown)    Patient or Caregiver expresses understanding of wearing Schedule and Precautions? Yes  Patient or Caregiver able to don/doff orthotic independently? Yes    Written orders provided to patient?  No  Orders Obtained: Written  Orders Obtained from: Diana Plascencia    Return for evaluation and treatment Yes 2 weeks (3/29)

## 2021-03-15 NOTE — PROGRESS NOTES
CHIEF COMPLAINT:  Chief Complaint   Patient presents with    Right Wrist - Follow-up       SUBJECTIVE:  Marisela Zhu is a 63y o  year old  female who presents for follow-up regarding closed right distal radius fracture  Patient stated that she has been in a short arm cast for the last 4 weeks with she tolerated well  Patient denied any numbness or tingling about the right hand   Patient denied any new injuries about the right wrist       PAST MEDICAL HISTORY:  Past Medical History:   Diagnosis Date    Anxiety     Arthritis     Asthma     Breast cancer ([address] ) 01/01/2012    Closed fracture of radial styloid     Endometriosis     Forceps delivery     1991 daughter    GERD (gastroesophageal reflux disease)     H/O mitral valve prolapse     no regurgitation    Hiatal hernia     History of radiation therapy     Hyperlipidemia     Infertility, female     Normal delivery     1998 daughter    Sleep apnea     on cpap    TB lung, latent     treated with INH (in her 29's)       PAST SURGICAL HISTORY:  Past Surgical History:   Procedure Laterality Date    BREAST BIOPSY Right 04/01/2012    biopsy breast percutaneous needle core    BREAST LUMPECTOMY Right 08/01/2012    CHOLECYSTECTOMY  01/23/2013    DILATION AND CURETTAGE OF UTERUS      ENDOMETRIAL BIOPSY      without cervical dilation    LAPAROSCOPY      Exploratory 1990 & 1994    SALUD-EN-Y PROCEDURE  01/23/2013    Dr Mirian Edgar HISTORY:  Family History   Problem Relation Age of Onset    Colon cancer Mother 48    Stroke Father     No Known Problems Maternal Grandmother     Hypertension Family     Mitral valve prolapse Family     Osteoporosis Family     Varicose Veins Family     No Known Problems Sister     No Known Problems Daughter     No Known Problems Paternal Grandmother     No Known Problems Daughter     No Known Problems Paternal Aunt     Colon cancer Maternal Grandfather     No Known Problems Paternal Grandfather        SOCIAL HISTORY:  Social History     Tobacco Use    Smoking status: Never Smoker    Smokeless tobacco: Never Used   Substance Use Topics    Alcohol use: Yes     Alcohol/week: 1 0 standard drinks     Types: 1 Glasses of wine per week     Frequency: Monthly or less     Drinks per session: 1 or 2    Drug use: Never       MEDICATIONS:    Current Outpatient Medications:     albuterol (ProAir HFA) 90 mcg/act inhaler, ProAir HFA 90 mcg/actuation aerosol inhaler  Inhale 2 puffs every 4 hours by inhalation route for 30 days  , Disp: , Rfl:     Cholecalciferol (VITAMIN D) 2000 units CAPS, Take by mouth daily, Disp: , Rfl:     Multiple Vitamin (MULTI-VITAMIN DAILY) TABS, Take by mouth, Disp: , Rfl:     sertraline (ZOLOFT) 100 mg tablet, , Disp: , Rfl:     sertraline (ZOLOFT) 50 mg tablet, Take 50 mg by mouth 2 (two) times a day , Disp: , Rfl:     ALLERGIES:  No Known Allergies    REVIEW OF SYSTEMS:  Review of Systems   Constitutional: Negative for chills and fever  HENT: Negative for ear pain and sore throat  Eyes: Negative for pain and visual disturbance  Respiratory: Negative for cough and shortness of breath  Cardiovascular: Negative for chest pain and palpitations  Gastrointestinal: Negative for abdominal pain and vomiting  Genitourinary: Negative for dysuria and hematuria  Musculoskeletal: Negative for arthralgias and back pain  Skin: Negative for color change and rash  Neurological: Negative for seizures and syncope  All other systems reviewed and are negative        VITALS:  Vitals:    03/15/21 1124   BP: 126/79   Pulse: 67       LABS:  HgA1c:   Lab Results   Component Value Date    HGBA1C 5 4 03/18/2020     BMP:   Lab Results   Component Value Date    CALCIUM 9 8 01/18/2021    K 4 2 01/18/2021    CO2 30 01/18/2021     01/18/2021    BUN 14 01/18/2021    CREATININE 0 70 01/18/2021       _____________________________________________________  PHYSICAL EXAMINATION:  General: well developed and well nourished, alert, oriented times 3 and appears comfortable  Psychiatric: Normal  HEENT: Trachea Midline, No torticollis  Pulmonary: No audible wheezing or respiratory distress   Skin: No masses, erythema, lacerations, fluctation, ulcerations  Neurovascular: Sensation Intact to the Median, Ulnar, Radial Nerve, Motor Intact to the Median, Ulnar, Radial Nerve and Pulses Intact    MUSCULOSKELETAL EXAMINATION:  Right wrist:   Skin intact   No obvious swelling  No erythema or ecchymosis   Tenderness noted about distal radius   Sensation intact to median, radial and ulnar nerve distribution  Brisk capillary refill  Palpable distal radial pulse     ___________________________________________________  STUDIES REVIEWED:  Images obtained today of the right wrist 3 views demonstrate distal radius fracture with signs of healing      PROCEDURES PERFORMED:  Procedures  No Procedures performed today    _____________________________________________________  ASSESSMENT/PLAN:    63 y o  female with right distal radius fracture    -Patient was removed from her short arm cast today, which she tolerated well    -Patient was given a script for hand therapy to fashion a volar wrist splint and begin physical therapy in 2 weeks  -I will see her back in office in 4 weeks for a re-evaluation of the right wrist      Diagnoses and all orders for this visit:    Closed fracture of distal end of right radius, unspecified fracture morphology, initial encounter  -     XR wrist 3+ vw right; Future  -     Ambulatory referral to PT/OT hand therapy; Future    Other orders  -     Cancel: DXA bone density spine hip and pelvis; Future  -     sertraline (ZOLOFT) 100 mg tablet      Follow Up:  Return in about 4 weeks (around 4/12/2021) for Recheck right hand   To Do Next Visit:  Re-evaluation of current issue    General Discussions:  Fracture - Nonoperative Care: The physiology of a fractured bone was discussed with the patient today    With non-displaced or minimally displaced fractures, conservative treatment such as casting or splinting often results in a functional recovery  Typically, these fractures are immobilized in either a cast or splint depending on the pattern  Radiographs are typically taken at intervals throughout the fracture healing to ensure that reduction or alignment is not lost   If the fracture loses its alignment, surgical intervention may be required to stabilize it  Medical conditions such as diabetes, osteoporosis, vitamin D deficiency, and a history of or exposure to smoking may delay or prevent fracture healing  Options between cast/splint immobilization and surgical treatment were offered and the risks and benefits of both were discussed  Scribe Attestation    I,:  Navi Coello am acting as a scribe while in the presence of the attending physician :       I,:  Lina Walton MD personally performed the services described in this documentation    as scribed in my presence :           Portions of the record may have been created with voice recognition software  Occasional wrong word or "sound a like" substitutions may have occurred due to the inherent limitations of voice recognition software  Read the chart carefully and recognize, using context, where substitutions have occurred

## 2021-03-29 ENCOUNTER — APPOINTMENT (OUTPATIENT)
Dept: OCCUPATIONAL THERAPY | Age: 61
End: 2021-03-29
Payer: COMMERCIAL

## 2021-04-01 ENCOUNTER — ANNUAL EXAM (OUTPATIENT)
Dept: OBGYN CLINIC | Facility: CLINIC | Age: 61
End: 2021-04-01
Payer: COMMERCIAL

## 2021-04-01 VITALS — BODY MASS INDEX: 28.2 KG/M2 | DIASTOLIC BLOOD PRESSURE: 70 MMHG | SYSTOLIC BLOOD PRESSURE: 100 MMHG | WEIGHT: 144.4 LBS

## 2021-04-01 DIAGNOSIS — E28.319 PREMATURE MENOPAUSE: Primary | ICD-10-CM

## 2021-04-01 DIAGNOSIS — Z01.419 ENCOUNTER FOR GYNECOLOGICAL EXAMINATION (GENERAL) (ROUTINE) WITHOUT ABNORMAL FINDINGS: ICD-10-CM

## 2021-04-01 DIAGNOSIS — Z00.00 HEALTHCARE MAINTENANCE: ICD-10-CM

## 2021-04-01 DIAGNOSIS — Z13.820 SCREENING FOR OSTEOPOROSIS: ICD-10-CM

## 2021-04-01 DIAGNOSIS — Z86.000 HISTORY OF DUCTAL CARCINOMA IN SITU (DCIS) OF BREAST: ICD-10-CM

## 2021-04-01 PROCEDURE — 99396 PREV VISIT EST AGE 40-64: CPT | Performed by: NURSE PRACTITIONER

## 2021-04-01 NOTE — ASSESSMENT & PLAN NOTE
Benign findings on routine gyn exam  Recommended monthly SBE, annual CBE and annual screening mammo  ASCCP guidelines reviewed and pap with cotesting noted to be up to date; this low risk patient was advised she meets criteria to d/c pap screening at age 71  Colonoscopy referral given  The patient denies STI risk factors and declines testing at this time  Reviewed diet/activity recommendations:  Encouraged daily Ca++ and vitamin D intake as well as daily weight bearing exercise for promotion of bone health    Discussed postmenopausal considerations and symptoms to report  RTO in one year for routine annual gyn exam or sooner PRN

## 2021-04-01 NOTE — PROGRESS NOTES
Encounter for gynecological examination (general) (routine) without abnormal findings    Benign findings on routine gyn exam  Recommended monthly SBE, annual CBE and annual screening mammo  ASCCP guidelines reviewed and pap with cotesting noted to be up to date; this low risk patient was advised she meets criteria to d/c pap screening at age 71  Colonoscopy referral given  The patient denies STI risk factors and declines testing at this time  Reviewed diet/activity recommendations:  Encouraged daily Ca++ and vitamin D intake as well as daily weight bearing exercise for promotion of bone health    Discussed postmenopausal considerations and symptoms to report  RTO in one year for routine annual gyn exam or sooner PRN  History of ductal carcinoma in situ (DCIS) of breast  S/P lumpectomy and radiation    Premature menopause  Menopause @ 43 and recent wrist fracture  DEXA ordered  Diagnoses and all orders for this visit:    Premature menopause  -     DXA bone density spine hip and pelvis; Future    Healthcare maintenance  -     Ambulatory referral to Gastroenterology; Future    Encounter for gynecological examination (general) (routine) without abnormal findings    History of ductal carcinoma in situ (DCIS) of breast    Screening for osteoporosis  -     DXA bone density spine hip and pelvis; Future         Antoni Griffiths   1960    CC:  Yearly exam    S:  Antoni Griffiths is a 63 y o  female here for yearly exam  She is postmenopausal since age 43 and has had no vaginal bleeding  She denies abnormal vaginal discharge, itching, odor or dryness  She denies breast concerns, abdominal/pelvic pain or bladder/bowel dysfunction  Rare stress incontinence and no hot flashes  Had COVID vaccine    Sexual activity: She is sexually active without pain, bleeding or dryness  STD testing: She does not want STD testing today       Last Pap: 2/20 neg/neg   Last Mammo: 12/20 NATALIE 2  SBE: yes   Last Colonoscopy: 2/16-due -referral given   Last DEXA: needs baseline due to hx of premature menopause     Family hx of breast cancer: patient   Family hx of ovarian cancer: denies  Family hx of colon cancer: mom @ 48 and MGF    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    Current Outpatient Medications:     albuterol (ProAir HFA) 90 mcg/act inhaler, ProAir HFA 90 mcg/actuation aerosol inhaler  Inhale 2 puffs every 4 hours by inhalation route for 30 days  , Disp: , Rfl:     Cholecalciferol (VITAMIN D) 2000 units CAPS, Take by mouth daily, Disp: , Rfl:     Multiple Vitamin (MULTI-VITAMIN DAILY) TABS, Take by mouth, Disp: , Rfl:     sertraline (ZOLOFT) 50 mg tablet, Take 50 mg by mouth 2 (two) times a day , Disp: , Rfl:     sertraline (ZOLOFT) 100 mg tablet, , Disp: , Rfl:   Social History     Socioeconomic History    Marital status:      Spouse name: Not on file    Number of children: 2    Years of education: Not on file    Highest education level: Not on file   Occupational History    Not on file   Social Needs    Financial resource strain: Not on file    Food insecurity     Worry: Not on file     Inability: Not on file   [facility] needs     Medical: Not on file     Non-medical: Not on file   Tobacco Use    Smoking status: Never Smoker    Smokeless tobacco: Never Used   Substance and Sexual Activity    Alcohol use: Not Currently     Alcohol/week: 1 0 standard drinks     Types: 1 Glasses of wine per week     Frequency: Monthly or less     Drinks per session: 1 or 2    Drug use: Never    Sexual activity: Yes     Partners: Male   Lifestyle    Physical activity     Days per week: Not on file     Minutes per session: Not on file    Stress: Not on file   Relationships    Social connections     Talks on phone: Not on file     Gets together: Not on file     Attends Bahai service: Not on file     Active member of club or organization: Not on file     Attends meetings of clubs or organizations: Not on file     Relationship status: Not on file    Intimate partner violence     Fear of current or ex partner: Not on file     Emotionally abused: Not on file     Physically abused: Not on file     Forced sexual activity: Not on file   Other Topics Concern    Not on file   Social History Narrative        2 children    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, ceramics     Family History   Problem Relation Age of Onset    Colon cancer Mother 48    Stroke Father     No Known Problems Maternal Grandmother     Hypertension Family     Mitral valve prolapse Family     Osteoporosis Family     Varicose Veins Family     No Known Problems Sister     No Known Problems Daughter     No Known Problems Paternal Grandmother     No Known Problems Daughter     No Known Problems Paternal Aunt     Colon cancer Maternal Grandfather     No Known Problems Paternal Grandfather      Past Medical History:   Diagnosis Date    Anxiety     Arthritis     Asthma     Breast [address] [address] ) 01/01/2012    Closed fracture of radial styloid     Endometriosis     Forceps delivery     1991 daughter    GERD (gastroesophageal reflux disease)     H/O mitral valve prolapse     no regurgitation    Hiatal hernia     History of radiation therapy     Hyperlipidemia     Infertility, female     Normal delivery     1998 daughter    Sleep apnea     on cpap    TB lung, latent     treated with INH (in her 29's)        Review of Systems   Constitutional: Negative for appetite change, fatigue and unexpected weight change  Respiratory: Negative for shortness of breath  Cardiovascular: Negative for chest pain and leg swelling  Gastrointestinal: Negative for abdominal pain  Endocrine: Negative for cold intolerance and heat intolerance  Breasts:  Negative for breast tenderness or masses  Genitourinary: Negative for dyspareunia, dysuria, flank pain, frequency, genital sores, hematuria and pelvic pain     Positive for rare  stress incontinence  Musculoskeletal: Negative for back pain  Neurological: Negative for headaches  O:  Blood pressure 100/70, weight 65 5 kg (144 lb 6 4 oz)  Patient appears well and is not in distress  Neck is supple without masses  Normal thyroid  Heart regular rate and rhythm  Lungs CTA bilaterally   Breasts are symmetrical without mass, tenderness, nipple discharge, skin changes or adenopathy  + scar right breast mid outer region   Abdomen is soft and nontender without masses  External genitals are normal without lesions or rashes  Urethral meatus and urethra are normal  Bladder is normal to palpation  Vagina is normal without discharge or bleeding    + atrophic changes noted   Cervix is normal without discharge or lesion  Uterus is normal, mobile, nontender without palpable mass  Adnexa are normal, nontender, without palpable mass     Skin warm and dry   Capillary refill < 2 seconds  Alert and oriented x 3 with normal affect

## 2021-04-05 ENCOUNTER — EVALUATION (OUTPATIENT)
Dept: OCCUPATIONAL THERAPY | Age: 61
End: 2021-04-05
Payer: COMMERCIAL

## 2021-04-05 DIAGNOSIS — M25.531 RIGHT WRIST PAIN: Primary | ICD-10-CM

## 2021-04-05 PROCEDURE — 97010 HOT OR COLD PACKS THERAPY: CPT

## 2021-04-05 PROCEDURE — 97140 MANUAL THERAPY 1/> REGIONS: CPT

## 2021-04-05 PROCEDURE — 97165 OT EVAL LOW COMPLEX 30 MIN: CPT

## 2021-04-05 PROCEDURE — 97110 THERAPEUTIC EXERCISES: CPT

## 2021-04-05 NOTE — PROGRESS NOTES
OT Evaluation     Today's date: 2021  Patient name: Janu Doty  : 1960  MRN: [id number]  Referring provider: Barry Patel MD  Dx: No diagnosis found  Assessment  Assessment details: Patient presents with decreased right wrist ROM and forearm supination after sustaining a distal radius fracture in 2021  She is mostly out of her splint and using her extremity for light activity  She does have some wrist swelling  Finger ROM intact  Mild pain  With burning sensation around the distal ulnar  Impairments: abnormal or restricted ROM, impaired physical strength, lacks appropriate home exercise program and pain with function  Understanding of Dx/Px/POC: good   Prognosis: good    Goals  STGs  2 visits 1) instruct in A/Passive ROM progra 2) decrease edema LTG (6wks)   1) resolve swelling right distal UE 2)restore full wrist and forearm ROM 3) promote good functional use with ADL's    Plan  Patient would benefit from: skilled occupational therapy  Planned modality interventions: thermotherapy: hydrocollator packs  Planned therapy interventions: activity modification, stretching, patient education, therapeutic activities, therapeutic exercise, functional ROM exercises, graded exercise and home exercise program  Frequency: 2x week  Duration in weeks: 4  Treatment plan discussed with: patient        Subjective Evaluation    History of Present Illness  Date of onset: 2021  Mechanism of injury: Patient referred to therapy s/p right distal radius fracture  Treated conservatively with immobilisation  Patient wears her splint at night and only as she needs too  Pain only when picks something up a certain way     Pain  Current pain ratin  At best pain ratin  At worst pain ratin  Progression: improved    Hand dominance: right          Objective     Active Range of Motion     Left Elbow   Forearm supination: 75 degrees   Forearm pronation: 75 degrees     Right Elbow   Forearm supination: 60 degrees   Forearm pronation: 85 degrees     Left Wrist   Wrist flexion: 55 degrees   Wrist extension: 60 degrees   Radial deviation: 20 degrees   Ulnar deviation: 25 degrees     Right Wrist   Wrist flexion: 15 degrees   Wrist extension: 55 degrees   Radial deviation: 5 degrees   Ulnar deviation: 15 degrees     Passive Range of Motion     Left Wrist   Normal passive range of motion    Right Wrist   Wrist flexion: 45 degrees     Swelling     Left Wrist/Hand   Circumference wrist: 15 5 cm    Right Wrist/Hand   Circumference wrist: 16 5 cm             Precautions: universal     ROM orders only         Manuals 4/5            PROM wrist 5            PROM forearm 5'            Edema control                          Neuro Re-Ed                                                    HEP instruction TGE, ROM                                                    Ther Ex                          AROM- wrist Hold end ranges 15x            Forearm rotation 3x10                                                                             Ther Activity                                       Gait Training                                       Modalities             MH 10

## 2021-04-12 ENCOUNTER — APPOINTMENT (OUTPATIENT)
Dept: RADIOLOGY | Facility: MEDICAL CENTER | Age: 61
End: 2021-04-12
Payer: COMMERCIAL

## 2021-04-12 ENCOUNTER — APPOINTMENT (OUTPATIENT)
Dept: OCCUPATIONAL THERAPY | Age: 61
End: 2021-04-12
Payer: COMMERCIAL

## 2021-04-12 ENCOUNTER — OFFICE VISIT (OUTPATIENT)
Dept: OBGYN CLINIC | Facility: MEDICAL CENTER | Age: 61
End: 2021-04-12
Payer: COMMERCIAL

## 2021-04-12 VITALS
DIASTOLIC BLOOD PRESSURE: 73 MMHG | SYSTOLIC BLOOD PRESSURE: 113 MMHG | HEIGHT: 60 IN | WEIGHT: 144.4 LBS | HEART RATE: 57 BPM | BODY MASS INDEX: 28.35 KG/M2

## 2021-04-12 DIAGNOSIS — S52.501A CLOSED FRACTURE OF DISTAL END OF RIGHT RADIUS, UNSPECIFIED FRACTURE MORPHOLOGY, INITIAL ENCOUNTER: ICD-10-CM

## 2021-04-12 DIAGNOSIS — S52.501A CLOSED FRACTURE OF DISTAL END OF RIGHT RADIUS, UNSPECIFIED FRACTURE MORPHOLOGY, INITIAL ENCOUNTER: Primary | ICD-10-CM

## 2021-04-12 PROCEDURE — 73110 X-RAY EXAM OF WRIST: CPT

## 2021-04-12 PROCEDURE — 99213 OFFICE O/P EST LOW 20 MIN: CPT | Performed by: ORTHOPAEDIC SURGERY

## 2021-04-12 NOTE — PROGRESS NOTES
CHIEF COMPLAINT:  Chief Complaint   Patient presents with    Right Wrist - Follow-up       SUBJECTIVE:  Diana Fraser is a 63y o  year old female who presents for follow-up regarding Radius fracture treated conservatively in a cast   Patient's initial injury was on 02/18/2021 when she fell from a step stool  She landed on the outstretched hand  She states that she is doing much better and has minimal discomfort at this time  She has been going to physical therapy a few times which she notes better range of motion but still notes some stiffness 1st thing in the morning  She denies any numbness or tingling        PAST MEDICAL HISTORY:  Past Medical History:   Diagnosis Date    Anxiety     Arthritis     Asthma     Breast cancer ([facility] [address] ) 01/01/2012    Closed fracture of radial styloid     Endometriosis     Forceps delivery     1991 daughter    GERD (gastroesophageal reflux disease)     H/O mitral valve prolapse     no regurgitation    Hiatal hernia     History of radiation therapy     Hyperlipidemia     Infertility, female     Normal delivery     1998 daughter    Sleep apnea     on cpap    TB lung, latent     treated with INH (in her 29's)       PAST SURGICAL HISTORY:  Past Surgical History:   Procedure Laterality Date    BREAST BIOPSY Right 04/01/2012    biopsy breast percutaneous needle core    BREAST LUMPECTOMY Right 08/01/2012    CHOLECYSTECTOMY  01/23/2013    DILATION AND CURETTAGE OF UTERUS      ENDOMETRIAL BIOPSY      without cervical dilation    LAPAROSCOPY      Exploratory 1990 & 1994    SALUD-EN-Y PROCEDURE  01/23/2013    Dr Reji Delgado       FAMILY HISTORY:  Family History   Problem Relation Age of Onset    Colon cancer Mother 48    Stroke Father     No Known Problems Maternal Grandmother     Hypertension Family     Mitral valve prolapse Family     Osteoporosis Family     Varicose Veins Family     No Known Problems Sister     No Known Problems Daughter     No Known Problems Paternal Grandmother     No Known Problems Daughter     No Known Problems Paternal Aunt     Colon cancer Maternal Grandfather     No Known Problems Paternal Grandfather        SOCIAL HISTORY:  Social History     Tobacco Use    Smoking status: Never Smoker    Smokeless tobacco: Never Used   Substance Use Topics    Alcohol use: Not Currently     Alcohol/week: 1 0 standard drinks     Types: 1 Glasses of wine per week     Frequency: Monthly or less     Drinks per session: 1 or 2    Drug use: Never       MEDICATIONS:    Current Outpatient Medications:     albuterol (ProAir HFA) 90 mcg/act inhaler, ProAir HFA 90 mcg/actuation aerosol inhaler  Inhale 2 puffs every 4 hours by inhalation route for 30 days  , Disp: , Rfl:     Cholecalciferol (VITAMIN D) 2000 units CAPS, Take by mouth daily, Disp: , Rfl:     Multiple Vitamin (MULTI-VITAMIN DAILY) TABS, Take by mouth, Disp: , Rfl:     sertraline (ZOLOFT) 100 mg tablet, , Disp: , Rfl:     sertraline (ZOLOFT) 50 mg tablet, Take 50 mg by mouth 2 (two) times a day , Disp: , Rfl:     ALLERGIES:  No Known Allergies    REVIEW OF SYSTEMS:  Review of Systems    ROS:   General: no fever, no chills  HEENT:  No loss of hearing or eyesight problems  Eyes:  No red eyes  Respiratory:  No coughing, shortness of breath or wheezing  Cardiovascular:  No chest pain, no palpitations  GI:  Abdomen soft nontender, denies nausea  Endocrine:  No muscle weakness, no frequent urination, no excessive thirst  Urinary:  No dysuria, no incontinence  Musculoskeletal: see HPI and PE  SKIN:  No skin rash, no dry skin  Neurological:  No headaches, no confusion  Psychiatric:  No suicide thoughts, no anxiety, no depression  Review of all other systems is negative    VITALS:  Vitals:    04/12/21 0754   BP: 113/73   Pulse: 57       LABS:  HgA1c:   Lab Results   Component Value Date    HGBA1C 5 4 03/18/2020     BMP:   Lab Results   Component Value Date    CALCIUM 9 8 01/18/2021    K 4 2 01/18/2021    CO2 30 01/18/2021     01/18/2021    BUN 14 01/18/2021    CREATININE 0 70 01/18/2021       _____________________________________________________  PHYSICAL EXAMINATION:  General: well developed and well nourished, alert, oriented times 3 and appears comfortable  Psychiatric: Normal  HEENT: Trachea Midline, No torticollis  Pulmonary: No audible wheezing or respiratory distress   Skin: No masses, erythema, lacerations, fluctation, ulcerations  Neurovascular: Sensation Intact to the Median, Ulnar, Radial Nerve, Motor Intact to the Median, Ulnar, Radial Nerve and Pulses Intact    MUSCULOSKELETAL EXAMINATION:    Right wrist   No erythema edema or ecchymosis noted, skin warm to touch   No tenderness to palpation over the fracture site  Minimal tenderness over the ulnar carpal ligament   She has decreased range of motion with wrist flexion and extension compared to the contralateral side  No pain with axial loading    ___________________________________________________  STUDIES REVIEWED:  Images obtained today of the  right wrist 3 views demonstrate  fracture in stable alignment with evidence of callus formation      PROCEDURES PERFORMED:  Procedures  No Procedures performed today    _____________________________________________________  ASSESSMENT/PLAN:     right distal radius fracture  -  Patient was advised to continue to work on her range of motion with OT as well as home exercise program   -she can take Tylenol and anti-inflammatories as needed for pain  -  She was advised that she can have some ulnar carpal ligament pain after her injury which is normal for few months  If this is not controlled with Tylenol we can always administer a cortisone injection if needed  - she will follow-up with us as needed        Follow Up:  Return if symptoms worsen or fail to improve  Work/school status:  No restrictions    To Do Next Visit:  Re-evaluation of current issue    General Discussions:  Fracture - Nonoperative Care:  The physiology of a fractured bone was discussed with the patient today  With non-displaced or minimally displaced fractures, conservative treatment such as casting or splinting often results in a functional recovery  Typically, these fractures are immobilized in either a cast or splint depending on the pattern  Radiographs are typically taken at intervals throughout the fracture healing to ensure that reduction or alignment is not lost   If the fracture loses its alignment, surgical intervention may be required to stabilize it  Medical conditions such as diabetes, osteoporosis, vitamin D deficiency, and a history of or exposure to smoking may delay or prevent fracture healing  Options between cast/splint immobilization and surgical treatment were offered and the risks and benefits of both were discussed  Scribe Attestation    I,:  Chepe Waddell PA-C am acting as a scribe while in the presence of the attending physician :       I,:  Aminta Monet MD personally performed the services described in this documentation    as scribed in my presence :           Portions of the record may have been created with voice recognition software  Occasional wrong word or "sound a like" substitutions may have occurred due to the inherent limitations of voice recognition software  Read the chart carefully and recognize, using context, where substitutions have occurred

## 2021-04-14 ENCOUNTER — TRANSCRIBE ORDERS (OUTPATIENT)
Dept: ADMINISTRATIVE | Age: 61
End: 2021-04-14

## 2021-04-15 DIAGNOSIS — Z13.1 SCREENING FOR DIABETES MELLITUS: Primary | ICD-10-CM

## 2021-04-16 ENCOUNTER — LAB (OUTPATIENT)
Dept: LAB | Age: 61
End: 2021-04-16
Payer: COMMERCIAL

## 2021-04-16 DIAGNOSIS — Z13.1 SCREENING FOR DIABETES MELLITUS: ICD-10-CM

## 2021-04-16 LAB
EST. AVERAGE GLUCOSE BLD GHB EST-MCNC: 100 MG/DL
HBA1C MFR BLD: 5.1 %

## 2021-04-16 PROCEDURE — 83036 HEMOGLOBIN GLYCOSYLATED A1C: CPT

## 2021-04-16 PROCEDURE — 36415 COLL VENOUS BLD VENIPUNCTURE: CPT

## 2021-04-21 ENCOUNTER — OFFICE VISIT (OUTPATIENT)
Dept: OCCUPATIONAL THERAPY | Age: 61
End: 2021-04-21
Payer: COMMERCIAL

## 2021-04-21 DIAGNOSIS — M25.531 RIGHT WRIST PAIN: Primary | ICD-10-CM

## 2021-04-21 PROCEDURE — 97110 THERAPEUTIC EXERCISES: CPT

## 2021-04-21 PROCEDURE — 97140 MANUAL THERAPY 1/> REGIONS: CPT

## 2021-04-21 NOTE — PROGRESS NOTES
Daily Note     Today's date: 2021  Patient name: Eliel Mancia  : 1960  MRN: [id number]  Referring provider: Milka Urbina MD  Dx: No diagnosis found  Subjective: reports burning pain ulnar side of wrist        Objective: See treatment diary below  Patient noticing wrist pain at work using mouse  Slight  tenderness over distal ulnar  AROM wrist (flex/ext)     40/68  Sup/pron  72/70 degrees  Vs 60 wrist flexion on left  Full digital ROM      Assessment: Tolerated treatment fair  Patient would benefit from continued OT      Plan: Progress treatment as tolerated  Improve right wrist flexion by 10 degrees,  Resolve ulnar sided wrist pain, progressive strengthening as tolerated     Precautions: universal     ROM orders only         Manuals            PROM wrist 5 10'           PROM forearm 5' 5'           Edema control                          Neuro Re-Ed                                                    HEP instruction TGE, ROM                                                    Ther Ex                          AROM- wrist Hold end ranges 15x 1# WE/WF           Forearm rotation 3x10 2# 3x10           General wrist/hand strengthening  RB                                                               Ther Activity                                       Gait Training                                       Modalities             MH 10 5'

## 2021-04-28 ENCOUNTER — OFFICE VISIT (OUTPATIENT)
Dept: OCCUPATIONAL THERAPY | Age: 61
End: 2021-04-28
Payer: COMMERCIAL

## 2021-04-28 DIAGNOSIS — M25.531 RIGHT WRIST PAIN: Primary | ICD-10-CM

## 2021-04-28 PROCEDURE — 97110 THERAPEUTIC EXERCISES: CPT

## 2021-04-28 PROCEDURE — 97140 MANUAL THERAPY 1/> REGIONS: CPT

## 2021-04-28 NOTE — PROGRESS NOTES
Daily Note     Today's date: 2021  Patient name: Reggie Prajapati  : 1960  MRN: [id number]  Referring provider: Servando Silvestre MD  Dx: No diagnosis found  Subjective: patient reports slightly less ulnar sided wrist pain      Objective: See treatment diary below  Patient made adjustments to her work station  Feels it is better  Some decreased wrist flexion  Good wrist  Extension  Assessment: Tolerated treatment fair  Patient would benefit from continued OT      Plan: Progress treatment as tolerated  Improve right wrist flexion by 10 degrees,  Resolve ulnar sided wrist pain, progressive strengthening as tolerated     Precautions: universal     ROM orders only  Manuals           PROM wrist 5 10' 10'          PROM forearm 5' 5'           Edema control                          Neuro Re-Ed                                                    HEP instruction TGE, ROM                                                    Ther Ex                          AROM- wrist Hold end ranges 15x 1# WE/WF 2# WE/WF          Forearm rotation 3x10 2# 3x10 2# 3x10          General wrist/hand strengthening  RB RB             WE stretches with ball on table                                                    Ther Activity                                       Gait Training                                       Modalities              10 5' 10'

## 2021-05-07 ENCOUNTER — OFFICE VISIT (OUTPATIENT)
Dept: OCCUPATIONAL THERAPY | Age: 61
End: 2021-05-07
Payer: COMMERCIAL

## 2021-05-07 DIAGNOSIS — M25.531 RIGHT WRIST PAIN: Primary | ICD-10-CM

## 2021-05-07 PROCEDURE — 97140 MANUAL THERAPY 1/> REGIONS: CPT

## 2021-05-07 NOTE — PROGRESS NOTES
Daily Note     Today's date: 2021  Patient name: Jalyn Joe  : 1960  MRN: [id number]  Referring provider: Hilario Espinal MD  Dx: No diagnosis found  Subjective: notes problems with wrist swelling and pain  Objective: See treatment diary below  Presents with dorsal wrist swelling and ulnar sided tenderness   ROM  WFL-WNLs  Patient made adjustments to her work station and reports doing better  With that  Performed ROM , soft tissue massage and applied KT tape for swelling/pain  Assessment: Tolerated treatment fair  Patient would benefit from continued OT      Plan: Progress treatment as tolerated  Improve right wrist flexion by 10 degrees,  Resolve ulnar sided wrist pain, progressive strengthening as tolerated     Precautions: universal     ROM orders only  Manuals          PROM wrist 5 10' 10' 10         PROM forearm 5' 5'           Edema control    15             KT tape         Neuro Re-Ed                                                    HEP instruction TGE, ROM                                                    Ther Ex                          AROM- wrist Hold end ranges 15x 1# WE/WF 2# WE/WF          Forearm rotation 3x10 2# 3x10 2# 3x10          General wrist/hand strengthening  RB RB             WE stretches with ball on table                                                    Ther Activity                                       Gait Training                                       Modalities              10 5' 10' 5

## 2021-05-11 ENCOUNTER — OFFICE VISIT (OUTPATIENT)
Dept: OCCUPATIONAL THERAPY | Age: 61
End: 2021-05-11
Payer: COMMERCIAL

## 2021-05-11 DIAGNOSIS — M25.531 RIGHT WRIST PAIN: Primary | ICD-10-CM

## 2021-05-11 PROCEDURE — 97110 THERAPEUTIC EXERCISES: CPT

## 2021-05-11 PROCEDURE — 97140 MANUAL THERAPY 1/> REGIONS: CPT

## 2021-05-11 NOTE — PROGRESS NOTES
Daily Note     Today's date: 2021  Patient name: Antoni Griffiths  : 1960  MRN: [id number]  Referring provider: Davina Najera MD  Dx: No diagnosis found  Subjective: reports KT tape helped with dorsal wrist pain and swelling  VPR 4/10      Objective: See treatment diary below  Forearm rotation:   Sup 70, pron 82 degress   Wrist (flex/ext)  40/60   Left wrist flexion is 46 degrees  Decreased dorsal wrist tenderness and swelling  Applied KT tape    Assessment: Tolerated treatment well  Patient would benefit from continued OT      Plan: Progress treatment as tolerated  Improve right wrist flexion by 5 degrees,      Precautions: universal     ROM orders only  Manuals         PROM wrist 5 10' 10' 10 5'        PROM forearm 5' 5'   5'        Edema control    15             KT tape KT tape        Neuro Re-Ed                                                    HEP instruction TGE, ROM                                                    Ther Ex                          AROM- wrist Hold end ranges 15x 1# WE/WF 2# WE/WF  Wrist mike        Forearm rotation 3x10 2# 3x10 2# 3x10 2# 3x10 2# 3 x10        General wrist/hand strengthening  RB RB  WE/WF 2# 3x10, eccentric WE RPB 10x           WE stretches with ball on table              strengthening     RPB        Functional hand strengthening     Key turn  Large knob turn                     Ther Activity                                       Gait Training                                       Modalities              10 5' 10' 5 5'

## 2021-05-12 ENCOUNTER — OFFICE VISIT (OUTPATIENT)
Dept: FAMILY MEDICINE CLINIC | Facility: CLINIC | Age: 61
End: 2021-05-12
Payer: COMMERCIAL

## 2021-05-12 VITALS
HEIGHT: 60 IN | TEMPERATURE: 97.2 F | OXYGEN SATURATION: 96 % | WEIGHT: 144.6 LBS | HEART RATE: 84 BPM | DIASTOLIC BLOOD PRESSURE: 70 MMHG | RESPIRATION RATE: 16 BRPM | SYSTOLIC BLOOD PRESSURE: 120 MMHG | BODY MASS INDEX: 28.39 KG/M2

## 2021-05-12 DIAGNOSIS — F90.9 ATTENTION DEFICIT HYPERACTIVITY DISORDER (ADHD), UNSPECIFIED ADHD TYPE: Primary | ICD-10-CM

## 2021-05-12 PROBLEM — Z01.818 PRE-OPERATIVE GENERAL PHYSICAL EXAMINATION: Status: RESOLVED | Noted: 2021-01-14 | Resolved: 2021-05-12

## 2021-05-12 PROBLEM — Z01.419 ENCOUNTER FOR GYNECOLOGICAL EXAMINATION (GENERAL) (ROUTINE) WITHOUT ABNORMAL FINDINGS: Status: RESOLVED | Noted: 2021-04-01 | Resolved: 2021-05-12

## 2021-05-12 PROCEDURE — 99214 OFFICE O/P EST MOD 30 MIN: CPT | Performed by: FAMILY MEDICINE

## 2021-05-12 PROCEDURE — 93000 ELECTROCARDIOGRAM COMPLETE: CPT | Performed by: FAMILY MEDICINE

## 2021-05-12 RX ORDER — DEXTROAMPHETAMINE SACCHARATE, AMPHETAMINE ASPARTATE MONOHYDRATE, DEXTROAMPHETAMINE SULFATE AND AMPHETAMINE SULFATE 2.5; 2.5; 2.5; 2.5 MG/1; MG/1; MG/1; MG/1
10 CAPSULE, EXTENDED RELEASE ORAL EVERY MORNING
Qty: 30 CAPSULE | Refills: 0 | Status: SHIPPED | OUTPATIENT
Start: 2021-05-12 | End: 2021-06-09

## 2021-05-12 NOTE — PROGRESS NOTES
Assessment/Plan:    No problem-specific Assessment & Plan notes found for this encounter  Diagnoses and all orders for this visit:    Attention deficit hyperactivity disorder (ADHD), unspecified ADHD type  -     POCT ECG  -     amphetamine-dextroamphetamine (ADDERALL XR) 10 MG 24 hr capsule; Take 1 capsule (10 mg total) by mouth every morningMax Daily Amount: 10 mg        Patient's history and adult ADHD questionnaire are consistent with diagnosis of ADHD  She has reportedly tried bupropion in past and did not tolerate  She also recalls trying strattera and notes that it did not work  Tried her daughter's adderall 20 mg and tolerated  Would like to try this  Advised that this is controlled substance and will require her to complete controlled substance agreement with me and have periodic random drug screens  She is agreeable to this  In addition we discussed potential side effects of this medication including palpitations, anorexia, weight loss    ekg done tonight and showed normal sinus rhythm at 80 bpm    Start at low dose of 10 mg daily and f/u in 1 month  Will likely have to increase dose in 1 month  Call sooner if any issues with medication  pdmp queried and no red flags noted  Subjective:      Patient ID: Maricarmen Cifuentes is a 57 y o  female with hyperlipidemia, anxiety, depression, FREIDA, asthma and history of breast cancer who is here today for concern regarding ADHD  Currently taking zoloft 50 mg bid as prescribed by previous PCP by for her depression and anxiety  She states that previous PCP had also diagnosed her with ADHD in her adult years but states that she had issues as a child as well but was never diagnosed  She states that she took meds for this and is not sure what it was  She  has always "just compensated"  Struggled through nursing school  She states that she did try her daughter's adderall and tolerated it okay       Feels like she has difficulty with organizational skills, losing things  Some issues with focus, memory  Now that she is thinking about it, 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  Thinks it made her sick  She decline trying this  PHQ-9 Depression Screening    PHQ-9:   Frequency of the following problems over the past two weeks:      Little interest or pleasure in doing things: 0 - not at all  Feeling down, depressed, or hopeless: 0 - not at all  Trouble falling or staying asleep, or sleeping too much: 1 - several days  Feeling tired or having little energy: 0 - not at all  Poor appetite or overeatin - not at all  Feeling bad about yourself - or that you are a failure or have let yourself or your family down: 0 - not at all  Trouble concentrating on things, such as reading the newspaper or watching television: 1 - several days  Moving or speaking so slowly that other people could have noticed  Or the opposite - being so fidgety or restless that you have been moving around a lot more than usual: 0 - not at all  Thoughts that you would be better off dead, or of hurting yourself in some way: 0 - not at all  PHQ-2 Score: 0  PHQ-9 Score: 2       HARDEEP-7 Flowsheet Screening      Most Recent Value   Over the last two weeks, how often have you been bothered by the following problems? Feeling nervous, anxious, or on edge  3   Not being able to stop or control worrying  1   Worrying too much about different things  0   Trouble relaxing   0   Being so restless that it's hard to sit still  0   Becoming easily annoyed or irritable   0   Feeling afraid as if something awful might happen  0   How difficult have these problems made it for you to do your work, take care of things at home, or get along with other people? Not difficult at all   HARDEEP Score   4        Adult ADHD questionnaire completed in office tonight  See flowsheet     HPI    The following portions of the patient's history were reviewed and updated as appropriate:   She  has a past medical history of ADHD, Anxiety, Arthritis, Asthma, Breast cancer ([facility] [address] ) (01/01/2012), Closed fracture of radial styloid, Endometriosis, Forceps delivery, GERD (gastroesophageal reflux disease), H/O mitral valve prolapse, Hiatal hernia, History of radiation therapy, Hyperlipidemia, Infertility, female, Normal delivery, Sleep apnea, and TB lung, latent  She  has a past surgical history that includes Endometrial biopsy; Cholecystectomy (01/23/2013); Dilation and curettage of uterus; LAPAROSCOPY; Ursula-en-y procedure (01/23/2013); Breast biopsy (Right, 04/01/2012); and Breast lumpectomy (Right, 08/01/2012)  She  reports that she has never smoked  She has never used smokeless tobacco  She reports previous alcohol use of about 1 0 standard drinks of alcohol per week  She reports that she does not use drugs  Current Outpatient Medications on File Prior to Visit   Medication Sig    albuterol (ProAir HFA) 90 mcg/act inhaler ProAir HFA 90 mcg/actuation aerosol inhaler   Inhale 2 puffs every 4 hours by inhalation route for 30 days   Cholecalciferol (VITAMIN D) 2000 units CAPS Take by mouth daily    Multiple Vitamin (MULTI-VITAMIN DAILY) TABS Take by mouth    sertraline (ZOLOFT) 50 mg tablet Take 50 mg by mouth 2 (two) times a day     [DISCONTINUED] sertraline (ZOLOFT) 100 mg tablet      No current facility-administered medications on file prior to visit  She has No Known Allergies       Review of Systems      Objective:      /70 (BP Location: Left arm, Patient Position: Sitting, Cuff Size: Standard)   Pulse 84   Temp (!) 97 2 °F (36 2 °C) (Temporal)   Resp 16   Ht 5' (1 524 m)   Wt 65 6 kg (144 lb 9 6 oz)   SpO2 96%   BMI 28 24 kg/m²          Physical Exam

## 2021-05-13 ENCOUNTER — APPOINTMENT (OUTPATIENT)
Dept: OCCUPATIONAL THERAPY | Age: 61
End: 2021-05-13
Payer: COMMERCIAL

## 2021-05-17 ENCOUNTER — OFFICE VISIT (OUTPATIENT)
Dept: OCCUPATIONAL THERAPY | Age: 61
End: 2021-05-17
Payer: COMMERCIAL

## 2021-05-17 DIAGNOSIS — M25.531 RIGHT WRIST PAIN: Primary | ICD-10-CM

## 2021-05-17 PROCEDURE — 97140 MANUAL THERAPY 1/> REGIONS: CPT

## 2021-05-17 PROCEDURE — 97110 THERAPEUTIC EXERCISES: CPT

## 2021-05-17 NOTE — PROGRESS NOTES
Daily Note     Today's date: 2021  Patient name: Cesar Mojica  : 1960  MRN: [id number]  Referring provider: David Chen MD  Dx: No diagnosis found  Subjective: reports decreased dorsal wrist pain  Objective: See treatment diary below  Mild dorsal wrist pain  KT helps control any discomfort  Active wrist flexion  44 degrees  Mildly tight with pronation   Less pain in wrist during work day  Assessment: Tolerated treatment well  Patient would benefit from continued OT      Plan: Progress treatment as tolerated  Improve right wrist flexion by 5 degrees,      Precautions: universal     ROM orders only  Manuals        PROM wrist 5 10' 10' 10 5' 10       PROM forearm 5' 5'   5' 10       Edema control    15             KT tape KT tape        Neuro Re-Ed                                                    HEP instruction TGE, ROM                                                    Ther Ex                          AROM- wrist Hold end ranges 15x 1# WE/WF 2# WE/WF  Wrist mike        Forearm rotation 3x10 2# 3x10 2# 3x10 2# 3x10 2# 3 x10 WE/WF 2# 3x10       General wrist/hand strengthening  RB RB  WE/WF 2# 3x10, eccentric WE RPB 10x Blue hammer 3x10  Yellow hammer 2x10          WE stretches with ball on table              strengthening     RPB        Functional hand strengthening     Key turn  Large knob turn                     Ther Activity                                       Gait Training                                       Modalities              10 5' 10' 5 5' 10'

## 2021-05-20 ENCOUNTER — OFFICE VISIT (OUTPATIENT)
Dept: OCCUPATIONAL THERAPY | Age: 61
End: 2021-05-20
Payer: COMMERCIAL

## 2021-05-20 DIAGNOSIS — M25.531 RIGHT WRIST PAIN: Primary | ICD-10-CM

## 2021-05-20 PROCEDURE — 97140 MANUAL THERAPY 1/> REGIONS: CPT

## 2021-05-20 PROCEDURE — 97110 THERAPEUTIC EXERCISES: CPT

## 2021-05-20 NOTE — PROGRESS NOTES
Daily Note     Today's date: 2021  Patient name: Elsi Jimenez  : 1960  MRN: [id number]  Referring provider: Dusty Crawford MD  Dx: No diagnosis found  Subjective: return of burning pain dorsal wrist        Objective: See treatment diary below  Mild dorsal wrist pain  KT helps control any discomfort  Trying U/S to dorsal wrist   Given another Tubigrip sleeve  Instructed how to self apply KT tape for dorsal wrist swelling  Discussed seeing Dr Garlan Pallas for persistent dorsal wrist pain and swelling  Assessment: Tolerated treatment well  Patient would benefit from continued OT      Plan: Progress treatment as tolerated  Goals 1) decrease dorsal wrist swelling 2) resolve burning pain  Precautions: universal       Manuals       PROM wrist 5 10' 10' 10 5' 10 5      PROM forearm 5' 5'   5' 10       Edema control    15   5' MEM           KT tape KT tape  KT tape given to put on tomorrow      Neuro Re-Ed                                                    HEP instruction TGE, ROM                                                    Ther Ex             EDC       MB      AROM- wrist Hold end ranges 15x 1# WE/WF 2# WE/WF  Wrist mike  Synergy motion  Forearm rotation 3x10 2# 3x10 2# 3x10 2# 3x10 2# 3 x10 WE/WF 2# 3x10       General wrist/hand strengthening  RB RB  WE/WF 2# 3x10, eccentric WE RPB 10x Blue hammer 3x10  Yellow hammer 2x10          WE stretches with ball on table              strengthening     RPB        Functional hand strengthening     Key turn  Large knob turn        Isometrics       Pain free EDC      Ther Activity                                                                              Modalities             MH 10 5' 10' 5 5' 10' 5'      U/S dorsal wrist 50%       7'

## 2021-05-21 ENCOUNTER — HOSPITAL ENCOUNTER (OUTPATIENT)
Dept: RADIOLOGY | Age: 61
Discharge: HOME | End: 2021-05-21
Payer: COMMERCIAL

## 2021-05-21 DIAGNOSIS — Z13.820 SCREENING FOR OSTEOPOROSIS: ICD-10-CM

## 2021-05-21 DIAGNOSIS — E28.319 PREMATURE MENOPAUSE: ICD-10-CM

## 2021-05-21 PROCEDURE — 77080 DXA BONE DENSITY AXIAL: CPT

## 2021-05-24 ENCOUNTER — OFFICE VISIT (OUTPATIENT)
Dept: OCCUPATIONAL THERAPY | Age: 61
End: 2021-05-24
Payer: COMMERCIAL

## 2021-05-24 ENCOUNTER — TELEPHONE (OUTPATIENT)
Dept: OBGYN CLINIC | Facility: MEDICAL CENTER | Age: 61
End: 2021-05-24

## 2021-05-24 DIAGNOSIS — M25.531 RIGHT WRIST PAIN: Primary | ICD-10-CM

## 2021-05-24 PROCEDURE — 97140 MANUAL THERAPY 1/> REGIONS: CPT

## 2021-05-24 PROCEDURE — 97110 THERAPEUTIC EXERCISES: CPT

## 2021-05-24 NOTE — PROGRESS NOTES
Daily Note     Today's date: 2021  Patient name: Maricarmen Cifuentes  : 1960  MRN: [id number]  Referring provider: Sharon Strong MD  Dx:   Encounter Diagnosis     ICD-10-CM    1  Right wrist pain  M25 531                   Subjective: return of burning pain dorsal wrist        Objective: See treatment diary below  Mild dorsal wrist pain  KT helps control any discomfort  Trying U/S to dorsal wrist   Given another Tubigrip sleeve  Instructed how to self apply KT tape for dorsal wrist swelling  Discussed seeing Dr Uriah Melgar for persistent dorsal wrist pain and swelling  Assessment: Tolerated treatment well  Patient would benefit from continued OT      Plan: Progress treatment as tolerated  Goals 1) decrease dorsal wrist swelling 2) resolve burning pain  Precautions: universal       Manuals      Extensor stretches 5 10' 10' 10 5' 10 5 5      5' 5'   5' 10       Edema control    15   5' MEM       IASTM forearm extensors    KT tape KT tape  KT tape given to put on tomorrow 5'             KT tape                                            HEP instruction TGE, ROM                                                    Ther Ex             EDC       MB MB     AROM- wrist Hold end ranges 15x 1# WE/WF 2# WE/WF  Wrist mike  Synergy motion  Forearm rotation 3x10 2# 3x10 2# 3x10 2# 3x10 2# 3 x10 WE/WF 2# 3x10       General wrist/hand strengthening  RB RB  WE/WF 2# 3x10, eccentric WE RPB 10x Blue hammer 3x10  Yellow hammer 2x10          WE stretches with ball on table        WF with wrist bar 3x10      strengthening     RPB        Functional hand strengthening     Key turn  Large knob turn        Isometrics       Pain free EDC      Ther Activity                                                                              Modalities             MH 10 5' 10' 5 5' 10' 5' 10'     U/S dorsal wrist 50%       7'

## 2021-05-24 NOTE — TELEPHONE ENCOUNTER
Tried to call pt as stated in comm consent  "wireless customer not available"    Sent mychart msg with dexa results from [address]

## 2021-05-24 NOTE — TELEPHONE ENCOUNTER
Please let her know her DEXA showed osteoporosis   Recommendations are:  A daily intake of at least 1200 mg calcium and 1000 IU of Vitamin D, as well as weight bearing and muscle strengthening exercise, fall prevention and avoidance of tobacco and excessive alcohol intake as basic preventive measures are suggested    She should also talk to her PCP about medication options for osteoporosis

## 2021-05-27 ENCOUNTER — APPOINTMENT (OUTPATIENT)
Dept: OCCUPATIONAL THERAPY | Age: 61
End: 2021-05-27
Payer: COMMERCIAL

## 2021-06-08 ENCOUNTER — OFFICE VISIT (OUTPATIENT)
Dept: OBGYN CLINIC | Facility: CLINIC | Age: 61
End: 2021-06-08
Payer: COMMERCIAL

## 2021-06-08 VITALS
HEART RATE: 60 BPM | SYSTOLIC BLOOD PRESSURE: 118 MMHG | HEIGHT: 60 IN | WEIGHT: 144 LBS | DIASTOLIC BLOOD PRESSURE: 77 MMHG | BODY MASS INDEX: 28.27 KG/M2

## 2021-06-08 DIAGNOSIS — M25.531 PAIN IN RIGHT WRIST: Primary | ICD-10-CM

## 2021-06-08 PROCEDURE — 99213 OFFICE O/P EST LOW 20 MIN: CPT | Performed by: ORTHOPAEDIC SURGERY

## 2021-06-08 PROCEDURE — 20550 NJX 1 TENDON SHEATH/LIGAMENT: CPT | Performed by: ORTHOPAEDIC SURGERY

## 2021-06-08 RX ORDER — TRIAMCINOLONE ACETONIDE 40 MG/ML
20 INJECTION, SUSPENSION INTRA-ARTICULAR; INTRAMUSCULAR
Status: COMPLETED | OUTPATIENT
Start: 2021-06-08 | End: 2021-06-08

## 2021-06-08 RX ORDER — LIDOCAINE HYDROCHLORIDE 10 MG/ML
0.5 INJECTION, SOLUTION INFILTRATION; PERINEURAL
Status: COMPLETED | OUTPATIENT
Start: 2021-06-08 | End: 2021-06-08

## 2021-06-08 RX ADMIN — LIDOCAINE HYDROCHLORIDE 0.5 ML: 10 INJECTION, SOLUTION INFILTRATION; PERINEURAL at 08:33

## 2021-06-08 RX ADMIN — TRIAMCINOLONE ACETONIDE 20 MG: 40 INJECTION, SUSPENSION INTRA-ARTICULAR; INTRAMUSCULAR at 08:33

## 2021-06-08 NOTE — PROGRESS NOTES
CHIEF COMPLAINT:  Chief Complaint   Patient presents with    Right Wrist - Follow-up       SUBJECTIVE:  Kim Cash is a 57y o  year old female who presents for follow-up regarding right wrist burning sensation  Patient previously had an injury on 02/18/2021 when she fell from a step stool  She had a distal radius fracture that was treated conservatively in a cast   She has been working with physical therapy to work on her range of motion  She states that she has a burning sensation now over the dorsal aspect of her wrist   She states it hurts more with movement  She has tried to take some anti-inflammatories with minimal relief of her symptoms        PAST MEDICAL HISTORY:  Past Medical History:   Diagnosis Date    ADHD     Anxiety     Arthritis     Asthma     Breast cancer ([address] ) 01/01/2012    Closed fracture of radial styloid     Endometriosis     Forceps delivery     1991 daughter    GERD (gastroesophageal reflux disease)     H/O mitral valve prolapse     no regurgitation    Hiatal hernia     History of radiation therapy     Hyperlipidemia     Infertility, female     Normal delivery     1998 daughter    Sleep apnea     on cpap    TB lung, latent     treated with INH (in her 29's)       PAST SURGICAL HISTORY:  Past Surgical History:   Procedure Laterality Date    BREAST BIOPSY Right 04/01/2012    biopsy breast percutaneous needle core    BREAST LUMPECTOMY Right 08/01/2012    CHOLECYSTECTOMY  01/23/2013    DILATION AND CURETTAGE OF UTERUS      ENDOMETRIAL BIOPSY      without cervical dilation    LAPAROSCOPY      Exploratory 1990 & 1994    SALUD-EN-Y PROCEDURE  01/23/2013    Dr Maddox Reasoner       FAMILY HISTORY:  Family History   Problem Relation Age of Onset    Colon cancer Mother 48    Stroke Father     No Known Problems Maternal Grandmother     Hypertension Family     Mitral valve prolapse Family     Osteoporosis Family     Varicose Veins Family     No Known Problems Sister     No Known Problems Daughter     No Known Problems Paternal Grandmother     No Known Problems Daughter     No Known Problems Paternal Aunt     Colon cancer Maternal Grandfather     No Known Problems Paternal Grandfather        SOCIAL HISTORY:  Social History     Tobacco Use    Smoking status: Never Smoker    Smokeless tobacco: Never Used   Substance Use Topics    Alcohol use: Not Currently     Alcohol/week: 1 0 standard drinks     Types: 1 Glasses of wine per week     Frequency: Monthly or less     Drinks per session: 1 or 2    Drug use: Never       MEDICATIONS:    Current Outpatient Medications:     albuterol (ProAir HFA) 90 mcg/act inhaler, ProAir HFA 90 mcg/actuation aerosol inhaler  Inhale 2 puffs every 4 hours by inhalation route for 30 days  , Disp: , Rfl:     amphetamine-dextroamphetamine (ADDERALL XR) 10 MG 24 hr capsule, Take 1 capsule (10 mg total) by mouth every morningMax Daily Amount: 10 mg, Disp: 30 capsule, Rfl: 0    Cholecalciferol (VITAMIN D) 2000 units CAPS, Take by mouth daily, Disp: , Rfl:     Multiple Vitamin (MULTI-VITAMIN DAILY) TABS, Take by mouth, Disp: , Rfl:     sertraline (ZOLOFT) 50 mg tablet, Take 50 mg by mouth 2 (two) times a day , Disp: , Rfl:     ALLERGIES:  No Known Allergies    REVIEW OF SYSTEMS:  Review of Systems  ROS:   General: no fever, no chills  HEENT:  No loss of hearing or eyesight problems  Eyes:  No red eyes  Respiratory:  No coughing, shortness of breath or wheezing  Cardiovascular:  No chest pain, no palpitations  GI:  Abdomen soft nontender, denies nausea  Endocrine:  No muscle weakness, no frequent urination, no excessive thirst  Urinary:  No dysuria, no incontinence  Musculoskeletal: see HPI and PE  SKIN:  No skin rash, no dry skin  Neurological:  No headaches, no confusion  Psychiatric:  No suicide thoughts, no anxiety, no depression  Review of all other systems is negative    VITALS:  Vitals:    06/08/21 0819   BP: 118/77   Pulse: 60       LABS:  HgA1c: Lab Results   Component Value Date    HGBA1C 5 1 04/16/2021     BMP:   Lab Results   Component Value Date    CALCIUM 9 8 01/18/2021    K 4 2 01/18/2021    CO2 30 01/18/2021     01/18/2021    BUN 14 01/18/2021    CREATININE 0 70 01/18/2021       _____________________________________________________  PHYSICAL EXAMINATION:  General: well developed and well nourished, alert, oriented times 3 and appears comfortable  Psychiatric: Normal  HEENT: Trachea Midline, No torticollis  Pulmonary: No audible wheezing or respiratory distress   Skin: No masses, erythema, lacerations, fluctation, ulcerations  Neurovascular: Sensation Intact to the Median, Ulnar, Radial Nerve, Motor Intact to the Median, Ulnar, Radial Nerve and Pulses Intact    MUSCULOSKELETAL EXAMINATION:  Right wrist   No erythema edema or ecchymosis noted, skin is warm to touch   No tenderness to palpation over the fracture site   Tenderness to palpation over the ECU tendon   She does have decreased range of motion with wrist flexion and extension compared to the contralateral side   No pain with axial loading    ___________________________________________________  STUDIES REVIEWED:  No studies reviewed  PROCEDURES PERFORMED:  Hand/upper extremity injection  Universal Protocol:  Consent: Verbal consent obtained  Risks and benefits: risks, benefits and alternatives were discussed  Consent given by: patient  Patient understanding: patient states understanding of the procedure being performed  Patient consent: the patient's understanding of the procedure matches consent given  Site marked: the operative site was marked  Patient identity confirmed: verbally with patient    Supporting Documentation  Indications: pain   Procedure Details  Condition:tendonitis Wrist location: Right wrist ECU tendon     Preparation: Patient was prepped and draped in the usual sterile fashion  Needle size: 25 G  Approach: dorsal  Medications administered: 0 5 mL lidocaine 1 %; 20 mg triamcinolone acetonide 40 mg/mL    Patient tolerance: patient tolerated the procedure well with no immediate complications  Dressing:  Sterile dressing applied              _____________________________________________________  ASSESSMENT/PLAN:      Right wrist ECU tendonitis, s/p distal radius fracture sustained 02/18/2021  - patient was given a cortisone injection for right wrist ECU tendinitis  She tolerated well     -she can take Tylenol and anti-inflammatories as needed for pain  -she will follow-up in 3 weeks for re-evaluation        Follow Up:  Return in about 3 weeks (around 6/29/2021)  Work/school status:  No Restrictions    To Do Next Visit:  Re-evaluation of current issue        Scribe Attestation    I,:  Sharee Frederick PA-C am acting as a scribe while in the presence of the attending physician :       I,:  Sulema Lowe MD personally performed the services described in this documentation    as scribed in my presence :           Portions of the record may have been created with voice recognition software  Occasional wrong word or "sound a like" substitutions may have occurred due to the inherent limitations of voice recognition software  Read the chart carefully and recognize, using context, where substitutions have occurred

## 2021-06-09 ENCOUNTER — OFFICE VISIT (OUTPATIENT)
Dept: FAMILY MEDICINE CLINIC | Facility: CLINIC | Age: 61
End: 2021-06-09
Payer: COMMERCIAL

## 2021-06-09 VITALS
HEART RATE: 76 BPM | RESPIRATION RATE: 14 BRPM | OXYGEN SATURATION: 97 % | DIASTOLIC BLOOD PRESSURE: 80 MMHG | HEIGHT: 60 IN | BODY MASS INDEX: 28.23 KG/M2 | WEIGHT: 143.8 LBS | TEMPERATURE: 98 F | SYSTOLIC BLOOD PRESSURE: 120 MMHG

## 2021-06-09 DIAGNOSIS — F41.8 MIXED ANXIETY AND DEPRESSIVE DISORDER: Primary | ICD-10-CM

## 2021-06-09 DIAGNOSIS — F90.9 ATTENTION DEFICIT HYPERACTIVITY DISORDER (ADHD), UNSPECIFIED ADHD TYPE: ICD-10-CM

## 2021-06-09 PROCEDURE — 99213 OFFICE O/P EST LOW 20 MIN: CPT | Performed by: FAMILY MEDICINE

## 2021-06-09 RX ORDER — DEXTROAMPHETAMINE SACCHARATE, AMPHETAMINE ASPARTATE MONOHYDRATE, DEXTROAMPHETAMINE SULFATE AND AMPHETAMINE SULFATE 5; 5; 5; 5 MG/1; MG/1; MG/1; MG/1
20 CAPSULE, EXTENDED RELEASE ORAL EVERY MORNING
Qty: 30 CAPSULE | Refills: 0 | Status: SHIPPED | OUTPATIENT
Start: 2021-06-09 | End: 2021-07-07 | Stop reason: SDUPTHER

## 2021-06-09 NOTE — PROGRESS NOTES
Assessment/Plan:    No problem-specific Assessment & Plan notes found for this encounter  Diagnoses and all orders for this visit:    Mixed anxiety and depressive disorder  Stable   Refilled her zoloft which she is taking 50 mg bid rather than 100 mg once daily  Attention deficit hyperactivity disorder (ADHD), unspecified ADHD type  Tolerates the adderall well but does not see much difference on this medication with respect to her focus/attention  Will increase to 20  Mg daily  Recheck  1  month        Subjective:      Patient ID: Robert Silva is a 57 y o  female with hyperlipidemia, asthma, FREIDA, depression, history breast cancer, anxiety and ADHD here today for f/u on her ADHD  She was started on Adderall XR 10 mg daily at her visit in May  Had previously tried bupropion and this was ineffective for her  She states that she does not see much difference overall  Would like to try higher dose  She had previously tried her daughter's 20 mg dose and this seemed to work okay for her  Moods are okay  No side effects of the medication  HPI    The following portions of the patient's history were reviewed and updated as appropriate:   She  has a past medical history of ADHD, Anxiety, Arthritis, Asthma, Breast cancer ([facility] [address] ) (01/01/2012), Closed fracture of radial styloid, Endometriosis, Forceps delivery, GERD (gastroesophageal reflux disease), H/O mitral valve prolapse, Hiatal hernia, History of radiation therapy, Hyperlipidemia, Infertility, female, Normal delivery, Sleep apnea, and TB lung, latent  She  has a past surgical history that includes Endometrial biopsy; Cholecystectomy (01/23/2013); Dilation and curettage of uterus; LAPAROSCOPY; Ursula-en-y procedure (01/23/2013); Breast biopsy (Right, 04/01/2012); and Breast lumpectomy (Right, 08/01/2012)  She  reports that she has never smoked   She has never used smokeless tobacco  She reports previous alcohol use of about 1 0 standard drinks of alcohol per week  She reports that she does not use drugs  Current Outpatient Medications on File Prior to Visit   Medication Sig    albuterol (ProAir HFA) 90 mcg/act inhaler Inhale 2 puffs as needed     Cholecalciferol (VITAMIN D) 2000 units CAPS Take by mouth daily    Multiple Vitamin (MULTI-VITAMIN DAILY) TABS Take by mouth    sertraline (ZOLOFT) 50 mg tablet Take 50 mg by mouth 2 (two) times a day     [DISCONTINUED] amphetamine-dextroamphetamine (ADDERALL XR) 10 MG 24 hr capsule Take 1 capsule (10 mg total) by mouth every morningMax Daily Amount: 10 mg     No current facility-administered medications on file prior to visit  She has No Known Allergies       Review of Systems      Objective:      /80 (BP Location: Left arm, Patient Position: Sitting, Cuff Size: Standard)   Pulse 76   Temp 98 °F (36 7 °C) (Temporal)   Resp 14   Ht 5' (1 524 m)   Wt 65 2 kg (143 lb 12 8 oz)   SpO2 97%   BMI 28 08 kg/m²          Physical Exam  Vitals signs and nursing note reviewed  Constitutional:       General: She is not in acute distress  Appearance: Normal appearance  She is obese  She is not ill-appearing, toxic-appearing or diaphoretic  HENT:      Head: Normocephalic and atraumatic  Eyes:      Extraocular Movements: Extraocular movements intact  Conjunctiva/sclera: Conjunctivae normal       Pupils: Pupils are equal, round, and reactive to light  Neck:      Musculoskeletal: Normal range of motion  Cardiovascular:      Rate and Rhythm: Normal rate and regular rhythm  Pulmonary:      Effort: Pulmonary effort is normal       Breath sounds: Normal breath sounds  Lymphadenopathy:      Cervical: No cervical adenopathy  Skin:     General: Skin is warm and dry  Findings: No rash  Neurological:      Mental Status: She is alert     Psychiatric:         Mood and Affect: Mood normal

## 2021-07-07 ENCOUNTER — OFFICE VISIT (OUTPATIENT)
Dept: FAMILY MEDICINE CLINIC | Facility: CLINIC | Age: 61
End: 2021-07-07
Payer: COMMERCIAL

## 2021-07-07 VITALS
SYSTOLIC BLOOD PRESSURE: 120 MMHG | RESPIRATION RATE: 14 BRPM | HEART RATE: 70 BPM | WEIGHT: 142.8 LBS | BODY MASS INDEX: 28.03 KG/M2 | DIASTOLIC BLOOD PRESSURE: 76 MMHG | TEMPERATURE: 97.3 F | OXYGEN SATURATION: 97 % | HEIGHT: 60 IN

## 2021-07-07 DIAGNOSIS — F90.9 ATTENTION DEFICIT HYPERACTIVITY DISORDER (ADHD), UNSPECIFIED ADHD TYPE: Primary | ICD-10-CM

## 2021-07-07 PROCEDURE — 99213 OFFICE O/P EST LOW 20 MIN: CPT | Performed by: FAMILY MEDICINE

## 2021-07-07 RX ORDER — DEXTROAMPHETAMINE SACCHARATE, AMPHETAMINE ASPARTATE MONOHYDRATE, DEXTROAMPHETAMINE SULFATE AND AMPHETAMINE SULFATE 5; 5; 5; 5 MG/1; MG/1; MG/1; MG/1
20 CAPSULE, EXTENDED RELEASE ORAL EVERY MORNING
Qty: 90 CAPSULE | Refills: 0 | Status: SHIPPED | OUTPATIENT
Start: 2021-07-07 | End: 2021-10-20 | Stop reason: SDUPTHER

## 2021-07-07 NOTE — PROGRESS NOTES
Assessment/Plan:    No problem-specific Assessment & Plan notes found for this encounter  Diagnoses and all orders for this visit:    Attention deficit hyperactivity disorder (ADHD), unspecified ADHD type  Improved with the increased dose of adderall xr  Tolerating well  Will continue same  rx for #90 day supply sent to [facility]  She will f/u in 3 months        Subjective:      Patient ID: Angela Arevalo is a 57 y o  female with depression, add, asthma, FREIDA, hyperlipidemia and history of gastric bypass who is here today for f/u on her ADD  Her dose of adderall xr was increased from 10 to 20 mg daily at her last visit on 6/9/21 as the 10 mg did not seem to be effective for her  Patient has tried both bupropion and strattera in past  Neither were effective for her  States that there are days that she does not think it lasts as long she thinks it should  Others at work have noticed her being more focused and productive  No side effects other than occasional headache  States that drinking water will help  No palpitations, weight loss  No mood changes  She denies having any unfinished tasks or projects at home  Sleeping okay    HPI    The following portions of the patient's history were reviewed and updated as appropriate: She  has a past medical history of ADHD, Anxiety, Arthritis, Asthma, Breast cancer ([facility] [address] ) (01/01/2012), Closed fracture of radial styloid, Endometriosis, Forceps delivery, GERD (gastroesophageal reflux disease), H/O mitral valve prolapse, Hiatal hernia, History of radiation therapy, Hyperlipidemia, Infertility, female, Normal delivery, Sleep apnea, and TB lung, latent  She  has a past surgical history that includes Endometrial biopsy; Cholecystectomy (01/23/2013); Dilation and curettage of uterus; LAPAROSCOPY; Ursula-en-y procedure (01/23/2013); Breast biopsy (Right, 04/01/2012); and Breast lumpectomy (Right, 08/01/2012)    She  reports that she has never smoked  She has never used smokeless tobacco  She reports previous alcohol use of about 1 0 standard drinks of alcohol per week  She reports that she does not use drugs  Current Outpatient Medications on File Prior to Visit   Medication Sig    amphetamine-dextroamphetamine (ADDERALL XR) 20 MG 24 hr capsule Take 1 capsule (20 mg total) by mouth every morningMax Daily Amount: 20 mg    Cholecalciferol (VITAMIN D) 2000 units CAPS Take by mouth daily    Multiple Vitamin (MULTI-VITAMIN DAILY) TABS Take by mouth    sertraline (ZOLOFT) 50 mg tablet Take 1 tablet (50 mg total) by mouth 2 (two) times a day    [DISCONTINUED] albuterol (ProAir HFA) 90 mcg/act inhaler Inhale 2 puffs as needed  (Patient not taking: Reported on 7/7/2021)     No current facility-administered medications on file prior to visit  She has No Known Allergies       Review of Systems      Objective:      /76 (BP Location: Left arm, Patient Position: Sitting, Cuff Size: Standard)   Pulse 70   Temp (!) 97 3 °F (36 3 °C) (Temporal)   Resp 14   Ht 5' (1 524 m)   Wt 64 8 kg (142 lb 12 8 oz)   SpO2 97%   BMI 27 89 kg/m²          Physical Exam  Vitals and nursing note reviewed  Constitutional:       General: She is not in acute distress  Appearance: Normal appearance  She is not ill-appearing, toxic-appearing or diaphoretic  HENT:      Head: Normocephalic and atraumatic  Eyes:      Conjunctiva/sclera: Conjunctivae normal    Cardiovascular:      Rate and Rhythm: Normal rate and regular rhythm  Heart sounds: No murmur heard  Pulmonary:      Effort: Pulmonary effort is normal       Breath sounds: Normal breath sounds  Musculoskeletal:      Cervical back: Normal range of motion  Right lower leg: No edema  Left lower leg: No edema  Lymphadenopathy:      Cervical: No cervical adenopathy  Neurological:      General: No focal deficit present  Mental Status: She is alert     Psychiatric:         Mood and Affect: Mood normal

## 2021-09-16 ENCOUNTER — OFFICE VISIT (OUTPATIENT)
Dept: URGENT CARE | Age: 61
End: 2021-09-16
Payer: COMMERCIAL

## 2021-09-16 VITALS
SYSTOLIC BLOOD PRESSURE: 106 MMHG | DIASTOLIC BLOOD PRESSURE: 76 MMHG | OXYGEN SATURATION: 97 % | HEART RATE: 110 BPM | RESPIRATION RATE: 18 BRPM | TEMPERATURE: 97.7 F

## 2021-09-16 DIAGNOSIS — H92.03 EARACHE SYMPTOMS IN BOTH EARS: Primary | ICD-10-CM

## 2021-09-16 PROCEDURE — G0382 LEV 3 HOSP TYPE B ED VISIT: HCPCS | Performed by: NURSE PRACTITIONER

## 2021-09-16 NOTE — PROGRESS NOTES
[facility]        NAME: Hector Landry is a 57 y o  female  : 1960    MRN: [id number]  DATE: 2021  TIME: 3:12 PM    Assessment and Plan   Earache symptoms in both ears [H92 03]  1  Earache symptoms in both ears           Patient Instructions     Cont with antihistamine and decongestant  If s/s persist after several days, RTC for re-eval  Follow up with PCP in 3-5 days  Proceed to  ER if symptoms worsen  Chief Complaint     Chief Complaint   Patient presents with    Earache     b/l         History of Present Illness       HPI   Reports earache x 2 days  Says in the past when it occurs, she takes antihistamine and the ache goes away  She has not taken any today  Says feels like drainage, in the sinuses  Review of Systems   Review of Systems   Constitutional: Negative for chills and fever  HENT: Positive for ear pain and rhinorrhea  Negative for congestion, dental problem, ear discharge, facial swelling, hearing loss and postnasal drip  Respiratory: Negative for cough  Gastrointestinal: Negative for diarrhea and vomiting           Current Medications       Current Outpatient Medications:     amphetamine-dextroamphetamine (ADDERALL XR) 20 MG 24 hr capsule, Take 1 capsule (20 mg total) by mouth every morningMax Daily Amount: 20 mg, Disp: 90 capsule, Rfl: 0    Cholecalciferol (VITAMIN D) 2000 units CAPS, Take by mouth daily, Disp: , Rfl:     Multiple Vitamin (MULTI-VITAMIN DAILY) TABS, Take by mouth, Disp: , Rfl:     sertraline (ZOLOFT) 50 mg tablet, Take 1 tablet (50 mg total) by mouth 2 (two) times a day, Disp: 180 tablet, Rfl: 1    Current Allergies     Allergies as of 2021    (No Known Allergies)            The following portions of the patient's history were reviewed and updated as appropriate: allergies, current medications, past family history, past medical history, past social history, past surgical history and problem list      Past Medical History: Diagnosis Date    ADHD     Anxiety     Arthritis     Asthma     Breast cancer ([address] [address] ) 01/01/2012    Closed fracture of radial styloid     Endometriosis     Forceps delivery     1991 daughter    GERD (gastroesophageal reflux disease)     H/O mitral valve prolapse     no regurgitation    Hiatal hernia     History of radiation therapy     Hyperlipidemia     Infertility, female     Normal delivery     1998 daughter    Sleep apnea     on cpap    TB lung, latent     treated with INH (in her 29's)       Past Surgical History:   Procedure Laterality Date    BREAST BIOPSY Right 04/01/2012    biopsy breast percutaneous needle core    BREAST LUMPECTOMY Right 08/01/2012    CHOLECYSTECTOMY  01/23/2013    DILATION AND CURETTAGE OF UTERUS      ENDOMETRIAL BIOPSY      without cervical dilation    LAPAROSCOPY      Exploratory 1990 & 1994    SALUD-EN-Y PROCEDURE  01/23/2013    Dr Hudson Sit  5/14/2013       Family History   Problem Relation Age of Onset    Colon cancer Mother 48    Stroke Father     No Known Problems Maternal Grandmother     Hypertension Family     Mitral valve prolapse Family     Osteoporosis Family     Varicose Veins Family     No Known Problems Sister     No Known Problems Daughter     No Known Problems Paternal Grandmother     No Known Problems Daughter     No Known Problems Paternal Aunt     Colon cancer Maternal Grandfather     No Known Problems Paternal Grandfather          Medications have been verified  Objective   /76   Pulse (!) 110   Temp 97 7 °F (36 5 °C)   Resp 18   SpO2 97%   No LMP recorded  Patient is postmenopausal        Physical Exam     Physical Exam  Constitutional:       Appearance: She is not ill-appearing or diaphoretic  HENT:      Right Ear: Tympanic membrane and ear canal normal       Left Ear: Tympanic membrane and ear canal normal       Nose: Rhinorrhea present        Mouth/Throat:      Comments: Mild post nasal drip  Cardiovascular:      Rate and Rhythm: Regular rhythm  Heart sounds: Normal heart sounds  Pulmonary:      Effort: Pulmonary effort is normal       Breath sounds: Normal breath sounds  No wheezing  Lymphadenopathy:      Cervical: Cervical adenopathy (B/L upper neck) present

## 2021-10-12 DIAGNOSIS — F41.8 MIXED ANXIETY AND DEPRESSIVE DISORDER: ICD-10-CM

## 2021-10-20 ENCOUNTER — OFFICE VISIT (OUTPATIENT)
Dept: FAMILY MEDICINE CLINIC | Facility: CLINIC | Age: 61
End: 2021-10-20
Payer: COMMERCIAL

## 2021-10-20 VITALS
TEMPERATURE: 97.3 F | RESPIRATION RATE: 14 BRPM | DIASTOLIC BLOOD PRESSURE: 80 MMHG | BODY MASS INDEX: 28.57 KG/M2 | WEIGHT: 145.5 LBS | SYSTOLIC BLOOD PRESSURE: 120 MMHG | OXYGEN SATURATION: 97 % | HEART RATE: 85 BPM | HEIGHT: 60 IN

## 2021-10-20 DIAGNOSIS — J45.20 MILD INTERMITTENT ASTHMA WITHOUT COMPLICATION: ICD-10-CM

## 2021-10-20 DIAGNOSIS — G47.33 OBSTRUCTIVE SLEEP APNEA: ICD-10-CM

## 2021-10-20 DIAGNOSIS — F41.8 MIXED ANXIETY AND DEPRESSIVE DISORDER: Primary | ICD-10-CM

## 2021-10-20 DIAGNOSIS — F90.9 ATTENTION DEFICIT HYPERACTIVITY DISORDER (ADHD), UNSPECIFIED ADHD TYPE: ICD-10-CM

## 2021-10-20 DIAGNOSIS — Z23 ENCOUNTER FOR IMMUNIZATION: ICD-10-CM

## 2021-10-20 DIAGNOSIS — E78.2 MIXED HYPERLIPIDEMIA: ICD-10-CM

## 2021-10-20 PROCEDURE — 99214 OFFICE O/P EST MOD 30 MIN: CPT | Performed by: FAMILY MEDICINE

## 2021-10-20 PROCEDURE — 90471 IMMUNIZATION ADMIN: CPT | Performed by: FAMILY MEDICINE

## 2021-10-20 PROCEDURE — 90682 RIV4 VACC RECOMBINANT DNA IM: CPT | Performed by: FAMILY MEDICINE

## 2021-10-20 RX ORDER — DEXTROAMPHETAMINE SACCHARATE, AMPHETAMINE ASPARTATE, DEXTROAMPHETAMINE SULFATE AND AMPHETAMINE SULFATE 2.5; 2.5; 2.5; 2.5 MG/1; MG/1; MG/1; MG/1
10 TABLET ORAL DAILY
Qty: 30 TABLET | Refills: 0 | Status: SHIPPED | OUTPATIENT
Start: 2021-10-20 | End: 2022-01-20 | Stop reason: SDUPTHER

## 2021-10-20 RX ORDER — DEXTROAMPHETAMINE SACCHARATE, AMPHETAMINE ASPARTATE MONOHYDRATE, DEXTROAMPHETAMINE SULFATE AND AMPHETAMINE SULFATE 5; 5; 5; 5 MG/1; MG/1; MG/1; MG/1
20 CAPSULE, EXTENDED RELEASE ORAL EVERY MORNING
Qty: 90 CAPSULE | Refills: 0 | Status: SHIPPED | OUTPATIENT
Start: 2021-10-20 | End: 2022-01-20 | Stop reason: SDUPTHER

## 2021-11-05 ENCOUNTER — TELEPHONE (OUTPATIENT)
Dept: SURGICAL ONCOLOGY | Facility: CLINIC | Age: 61
End: 2021-11-05

## 2021-12-01 ENCOUNTER — TELEPHONE (OUTPATIENT)
Dept: HEMATOLOGY ONCOLOGY | Facility: CLINIC | Age: 61
End: 2021-12-01

## 2021-12-14 ENCOUNTER — OFFICE VISIT (OUTPATIENT)
Dept: URGENT CARE | Age: 61
End: 2021-12-14
Payer: COMMERCIAL

## 2021-12-14 VITALS
OXYGEN SATURATION: 98 % | HEART RATE: 80 BPM | WEIGHT: 145 LBS | TEMPERATURE: 98 F | BODY MASS INDEX: 28.47 KG/M2 | RESPIRATION RATE: 20 BRPM | HEIGHT: 60 IN

## 2021-12-14 DIAGNOSIS — B96.89 BACTERIAL UPPER RESPIRATORY INFECTION: Primary | ICD-10-CM

## 2021-12-14 DIAGNOSIS — J06.9 BACTERIAL UPPER RESPIRATORY INFECTION: Primary | ICD-10-CM

## 2021-12-14 PROCEDURE — G0382 LEV 3 HOSP TYPE B ED VISIT: HCPCS | Performed by: NURSE PRACTITIONER

## 2021-12-14 RX ORDER — AZITHROMYCIN 250 MG/1
TABLET, FILM COATED ORAL
Qty: 6 TABLET | Refills: 0 | Status: SHIPPED | OUTPATIENT
Start: 2021-12-14 | End: 2021-12-18

## 2021-12-23 ENCOUNTER — HOSPITAL ENCOUNTER (OUTPATIENT)
Dept: RADIOLOGY | Age: 61
Discharge: HOME | End: 2021-12-23
Payer: COMMERCIAL

## 2021-12-23 VITALS — HEIGHT: 60 IN | BODY MASS INDEX: 28.47 KG/M2 | WEIGHT: 145 LBS

## 2021-12-23 DIAGNOSIS — Z12.31 VISIT FOR SCREENING MAMMOGRAM: ICD-10-CM

## 2021-12-23 PROCEDURE — 77067 SCR MAMMO BI INCL CAD: CPT

## 2021-12-23 PROCEDURE — 77063 BREAST TOMOSYNTHESIS BI: CPT

## 2021-12-28 ENCOUNTER — TELEPHONE (OUTPATIENT)
Dept: FAMILY MEDICINE CLINIC | Facility: CLINIC | Age: 61
End: 2021-12-28

## 2022-01-05 ENCOUNTER — OFFICE VISIT (OUTPATIENT)
Dept: SURGICAL ONCOLOGY | Facility: CLINIC | Age: 62
End: 2022-01-05
Payer: COMMERCIAL

## 2022-01-05 VITALS
HEIGHT: 60 IN | TEMPERATURE: 98.2 F | HEART RATE: 75 BPM | RESPIRATION RATE: 17 BRPM | OXYGEN SATURATION: 99 % | WEIGHT: 146 LBS | BODY MASS INDEX: 28.66 KG/M2 | SYSTOLIC BLOOD PRESSURE: 118 MMHG | DIASTOLIC BLOOD PRESSURE: 64 MMHG

## 2022-01-05 DIAGNOSIS — Z08 ENCOUNTER FOR FOLLOW-UP SURVEILLANCE OF DUCTAL CARCINOMA IN SITU OF BREAST: ICD-10-CM

## 2022-01-05 DIAGNOSIS — Z86.000 ENCOUNTER FOR FOLLOW-UP SURVEILLANCE OF DUCTAL CARCINOMA IN SITU OF BREAST: ICD-10-CM

## 2022-01-05 DIAGNOSIS — Z12.31 SCREENING MAMMOGRAM, ENCOUNTER FOR: ICD-10-CM

## 2022-01-05 DIAGNOSIS — Z86.000 HISTORY OF DUCTAL CARCINOMA IN SITU (DCIS) OF BREAST: Primary | ICD-10-CM

## 2022-01-05 PROCEDURE — 99213 OFFICE O/P EST LOW 20 MIN: CPT | Performed by: SURGERY

## 2022-01-05 NOTE — PROGRESS NOTES
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  She has no complaints referable to her breast   Her recent mammogram showed no worrisome findings  Her clinical exam shows no evidence of local regional or distant recurrence disease  We will see her back in 1 year's time with a follow-up screening mammogram     Cancer History:     Oncology History   History of ductal carcinoma in situ (DCIS) of breast   5/14/2013 Initial Diagnosis    Biopsy at [facility]  Right Atypical ductal hyperplasia     6/25/2013 Surgery    Right needle localization/lumpectomy  DCIS  Grade 1      Stage 0     7/2013 -  Hormone Therapy    Med onc consult  No systemic therapy recommended     9/6/2013 - 10/23/2013 Radiation    Whole breast Radiation therapy           Interval History:   Patient had a normal screening mammogram   She has no complaints referable to her breast     Review of Systems:   Review of Systems   Constitutional: Negative for activity change, appetite change and fatigue  HENT: Negative  Eyes: Negative  Respiratory: Negative for cough, shortness of breath and wheezing  Cardiovascular: Negative for chest pain and leg swelling  Gastrointestinal: Negative  Endocrine: Negative  Genitourinary: Negative  Musculoskeletal:        No new changes or complaints of bone pain   Skin: Negative  Allergic/Immunologic: Negative  Neurological: Negative  Hematological: Negative      Psychiatric/Behavioral: Negative          Past Medical History     Patient Active Problem List   Diagnosis    History of ductal carcinoma in situ (DCIS) of breast    Mixed hyperlipidemia    Obstructive sleep apnea    Status post gastric bypass for obesity    Mixed anxiety and depressive disorder    Premature menopause    ADHD     Past Medical History:   Diagnosis Date    ADHD     Anxiety     Arthritis     Asthma     Breast cancer ([address] ) 01/01/2012    Closed fracture of radial styloid     Endometriosis     Forceps delivery     1991 daughter    GERD (gastroesophageal reflux disease)     H/O mitral valve prolapse     no regurgitation    Hiatal hernia     History of radiation therapy     Hyperlipidemia     Infertility, female     Normal delivery     1998 daughter    Sleep apnea     on cpap    TB lung, latent     treated with INH (in her 29's)     Past Surgical History:   Procedure Laterality Date    BREAST BIOPSY Right 04/01/2012    biopsy breast percutaneous needle core    BREAST LUMPECTOMY Right 08/01/2012    CHOLECYSTECTOMY  01/23/2013    DILATION AND CURETTAGE OF UTERUS      ENDOMETRIAL BIOPSY      without cervical dilation    LAPAROSCOPY      Exploratory 1990 & 1994    SALUD-EN-Y PROCEDURE  01/23/2013    Dr Belinda Bocanegra  5/14/2013     Family History   Problem Relation Age of Onset    Colon cancer Mother 48    Stroke Father     No Known Problems Maternal Grandmother     Hypertension Family     Mitral valve prolapse Family     Osteoporosis Family     Varicose Veins Family     No Known Problems Sister     No Known Problems Daughter     No Known Problems Paternal Grandmother     No Known Problems Daughter     No Known Problems Paternal Aunt     Colon cancer Maternal Grandfather [de-identified]    No Known Problems Paternal Grandfather      Social History     Socioeconomic History    Marital status:      Spouse name: Not on file    Number of children: 2    Years of education: Not on file   [facility] Highest education level: Not on file   Occupational History    Not on file   Tobacco Use    Smoking status: Never Smoker    Smokeless tobacco: Never Used   Vaping Use    Vaping Use: Never used   Substance and Sexual Activity    Alcohol use: Not Currently     Alcohol/week: 1 0 standard drink     Types: 1 Glasses of wine per week    Drug use: Never    Sexual activity: Yes     Partners: Male   Other Topics Concern    Not on file   Social History Narrative        2 children    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, ceramics     Social Determinants of Health     Financial Resource Strain: Not on file   Food Insecurity: Not on file   Transportation Needs: Not on file   Physical Activity: Not on file   Stress: Not on file   Social Connections: Not on file   Intimate Partner Violence: Not on file   Housing Stability: Not on file       Current Outpatient Medications:     amphetamine-dextroamphetamine (ADDERALL XR) 20 MG 24 hr capsule, Take 1 capsule (20 mg total) by mouth every morningMax Daily Amount: 20 mg, Disp: 90 capsule, Rfl: 0    amphetamine-dextroamphetamine (ADDERALL, 10MG,) 10 mg tablet, Take 1 tablet (10 mg total) by mouth daily In afternoon as needed in addition to your XR 20 mg in AMMax Daily Amount: 10 mg, Disp: 30 tablet, Rfl: 0    Cholecalciferol (VITAMIN D) 2000 units CAPS, Take by mouth daily, Disp: , Rfl:     Multiple Vitamin (MULTI-VITAMIN DAILY) TABS, Take by mouth, Disp: , Rfl:     sertraline (ZOLOFT) 50 mg tablet, Take 1 tablet (50 mg total) by mouth 2 (two) times a day, Disp: 180 tablet, Rfl: 1  No Known Allergies    Physical Exam:     Vitals:    01/05/22 1535   BP: 118/64   Pulse: 75   Resp: 17   Temp: 98 2 °F (36 8 °C)   SpO2: 99%     Physical Exam  Vitals reviewed  Constitutional:       Appearance: She is well-developed  HENT:      Head: Normocephalic and atraumatic  Eyes:      Pupils: Pupils are equal, round, and reactive to light     Neck: Thyroid: No thyromegaly  Vascular: No JVD  Trachea: No tracheal deviation  Cardiovascular:      Rate and Rhythm: Normal rate and regular rhythm  Heart sounds: Normal heart sounds  No murmur heard  No friction rub  No gallop  Pulmonary:      Effort: Pulmonary effort is normal  No respiratory distress  Breath sounds: Normal breath sounds  No wheezing or rales  Chest:          Comments: Examination the right breast demonstrates indented scar  This is unchanged  There is no underlying mass  There is no nipple discharge  There is no axillary adenopathy  There are no masses within the remaining regions of the breast     The left breast was examined in the sitting and supine position  There are no worrisome skin changes, tenderness, inverted nipple, nipple discharge, swelling, bleeding or evidence of a mass in any quadrant  Brian survey demonstrated no evidence of any clinically suspicious axillary, pectoral or paraclavicular lymph nodes  Abdominal:      General: There is no distension  Palpations: Abdomen is soft  There is no hepatomegaly or mass  Tenderness: There is no abdominal tenderness  There is no guarding or rebound  Musculoskeletal:         General: No tenderness  Normal range of motion  Cervical back: Normal range of motion and neck supple  Lymphadenopathy:      Cervical: No cervical adenopathy  Skin:     General: Skin is warm and dry  Findings: No erythema or rash  Neurological:      Mental Status: She is alert and oriented to person, place, and time  Cranial Nerves: No cranial nerve deficit  Psychiatric:         Behavior: Behavior normal            Results & Discussion:   I reviewed her mammogram reports which demonstrate no evidence of disease  This is consistent with her clinical examination  The patient is now approximately 8 years out from her diagnosis  She is asymptomatic    She will contact us should she appreciate any changes in her breast   We will see her back in 1 year's time  Advance Care Planning/Advance Directives:  I discussed the disease status, treatment plans and follow-up with the patient

## 2022-01-07 ENCOUNTER — TELEPHONE (OUTPATIENT)
Dept: FAMILY MEDICINE CLINIC | Facility: CLINIC | Age: 62
End: 2022-01-07

## 2022-01-07 NOTE — TELEPHONE ENCOUNTER
Left message for patient to move earlier in the day to a 40 minute spot and for her to call to let us know if she can come in earlier that day or if she would need to reschedule for another day

## 2022-01-07 NOTE — TELEPHONE ENCOUNTER
----- Message from Hiral January, DO sent at 1/7/2022  1:43 PM EST -----  Regarding: This is a new pt  Please reschedule for 40 min  She can keep 1/11/21 new patient 40 minute appt and we can address her Anxiety/Depression / ADHD at that visit

## 2022-01-20 ENCOUNTER — OFFICE VISIT (OUTPATIENT)
Dept: FAMILY MEDICINE CLINIC | Facility: CLINIC | Age: 62
End: 2022-01-20
Payer: COMMERCIAL

## 2022-01-20 VITALS
TEMPERATURE: 97.7 F | DIASTOLIC BLOOD PRESSURE: 74 MMHG | OXYGEN SATURATION: 98 % | BODY MASS INDEX: 28.07 KG/M2 | SYSTOLIC BLOOD PRESSURE: 114 MMHG | HEART RATE: 68 BPM | HEIGHT: 60 IN | WEIGHT: 143 LBS | RESPIRATION RATE: 18 BRPM

## 2022-01-20 DIAGNOSIS — Z13.6 ENCOUNTER FOR LIPID SCREENING FOR CARDIOVASCULAR DISEASE: ICD-10-CM

## 2022-01-20 DIAGNOSIS — E78.00 ELEVATED LDL CHOLESTEROL LEVEL: ICD-10-CM

## 2022-01-20 DIAGNOSIS — F41.8 MIXED ANXIETY AND DEPRESSIVE DISORDER: ICD-10-CM

## 2022-01-20 DIAGNOSIS — Z13.29 THYROID DISORDER SCREENING: ICD-10-CM

## 2022-01-20 DIAGNOSIS — Z13.220 ENCOUNTER FOR LIPID SCREENING FOR CARDIOVASCULAR DISEASE: ICD-10-CM

## 2022-01-20 DIAGNOSIS — E66.3 OVERWEIGHT WITH BODY MASS INDEX (BMI) OF 28 TO 28.9 IN ADULT: ICD-10-CM

## 2022-01-20 DIAGNOSIS — S46.812A STRAIN OF LEFT TRAPEZIUS MUSCLE, INITIAL ENCOUNTER: ICD-10-CM

## 2022-01-20 DIAGNOSIS — F90.9 ATTENTION DEFICIT HYPERACTIVITY DISORDER (ADHD), UNSPECIFIED ADHD TYPE: Primary | ICD-10-CM

## 2022-01-20 PROCEDURE — 99214 OFFICE O/P EST MOD 30 MIN: CPT | Performed by: FAMILY MEDICINE

## 2022-01-20 RX ORDER — DEXTROAMPHETAMINE SACCHARATE, AMPHETAMINE ASPARTATE MONOHYDRATE, DEXTROAMPHETAMINE SULFATE AND AMPHETAMINE SULFATE 5; 5; 5; 5 MG/1; MG/1; MG/1; MG/1
20 CAPSULE, EXTENDED RELEASE ORAL EVERY MORNING
Qty: 90 CAPSULE | Refills: 0 | Status: SHIPPED | OUTPATIENT
Start: 2022-01-20 | End: 2022-04-19 | Stop reason: SDUPTHER

## 2022-01-20 RX ORDER — DEXTROAMPHETAMINE SACCHARATE, AMPHETAMINE ASPARTATE, DEXTROAMPHETAMINE SULFATE AND AMPHETAMINE SULFATE 2.5; 2.5; 2.5; 2.5 MG/1; MG/1; MG/1; MG/1
10 TABLET ORAL DAILY
Qty: 30 TABLET | Refills: 0 | Status: SHIPPED | OUTPATIENT
Start: 2022-01-20 | End: 2022-04-19 | Stop reason: SDUPTHER

## 2022-01-20 NOTE — PATIENT INSTRUCTIONS
Core Strengthening Exercises   AMBULATORY CARE:   What you need to know about core strengthening exercises: Your core includes the muscles of your lower back, hip, pelvis, and abdomen  Core strengthening exercises help heal and strengthen these muscles  This helps prevent another injury, and keeps your pelvis, spine, and hips in the correct position  Contact your healthcare provider if:   · You have sharp or worsening pain during exercise or at rest     · You have questions or concerns about your shoulder exercises  Safety tips:  Talk to your healthcare provider before you start an exercise program  A physical therapist can teach you how to do core strengthening exercises safely  · Do the exercises on a mat or firm surface  A firm surface will support your spine and prevent low back pain  Do not do these exercises on a bed  · Move slowly and smoothly  Avoid fast or jerky motions  · Stop if you feel pain  Core exercises should not be painful  Stop if you feel pain  · Breathe normally during core exercises  Do not hold your breath  This may cause an increase in blood pressure and prevent muscle strengthening  Your healthcare provider will tell you when to inhale and exhale during the exercise  · Begin all of your exercises with abdominal bracing  Abdominal bracing helps warm up your core muscles  You can also practice abdominal bracing throughout the day  Lie on your back with your knees bent and feet flat on the floor  Place your arms in a relaxed position beside your body  Tighten your abdominal muscles  Pull your belly button in and up toward your spine  Hold for 5 seconds  Relax your muscles  Repeat 10 times  Core strengthening exercises: Your healthcare provider will tell you how often to do these exercises  The provider will also tell you how many repetitions of each exercise you should do  Hold each exercise for 5 seconds or as directed   As you get stronger, increase your hold to 10 to 15 seconds  You can do some of these exercises on a stability ball, or with a weight  Ask your healthcare provider how to use a stability ball or weight for these exercises:  · Bridging:  Lie on your back with your knees bent and feet flat on the floor  Rest your arms at your side  Tighten your buttocks, and then lift your hips 1 inch off the floor  Hold for 5 seconds  When you can do this exercise without pain for 10 seconds, increase the distance you lift your hips  A good goal is to be able to lift your hips so that your shoulders, hips, and knees are in a straight line  · Dead bug:  Lie on your back with your knees bent and feet flat on the floor  Place your arms in a relaxed position beside your body  Begin with abdominal bracing  Next, raise one leg, keeping your knee bent  Hold for 5 seconds  Repeat with the other leg  When you can do this exercise without pain for 10 to 15 seconds, you may raise one straight leg and hold  Repeat with the other leg  · Quadruped:  Place your hands and knees on the floor  Keep your wrists directly below your shoulders and your knees directly below your hips  Pull your belly button in toward your spine  Do not flatten or arch your back  Tighten your abdominal muscles below your belly button  Hold for 5 seconds  When you can do this exercise without pain for 10 to 15 seconds, you may extend one arm and hold  Repeat on the other side  · Side bridge exercises:      ? Standing side bridge:  Stand next to a wall and extend one arm toward the wall  Place your palm flat on the wall with your fingers pointing upward  Begin with abdominal bracing  Next, without moving your feet, slowly bend your arm to 90 degrees  Hold for 5 seconds  Repeat on the other side  When you can do this exercise without pain for 10 to 15 seconds, you may do the bent leg side bridge on the floor  ?  Bent leg side bridge:  Lie on one side with your legs, hips, and shoulders in a straight line  Prop yourself up onto your forearm so your elbow is directly below your shoulder  Bend your knees back to 90 degrees  Begin with abdominal bracing  Next, lift your hips and balance yourself on your forearm and knees  Hold for 5 seconds  Repeat on the other side  When you can do this exercise without pain for 10 to 15 seconds, you may do the straight leg side bridge on the floor  ? Straight leg side bridge:  Lie on one side with your legs, hips, and shoulders in a straight line  Prop yourself up onto your forearm so your elbow is directly below your shoulder  Begin with abdominal bracing  Lift your hips off the floor and balance yourself on your forearm and the outside of your flexed foot  Do not let your ankle bend sideways  Hold for 5 seconds  Repeat on the other side  When you can do this exercise without pain for 10 to 15 seconds, ask your healthcare provider for more advanced exercises  · Superman:  Lie on your stomach  Extend your arms forward on the floor  Tighten your abdominal muscles and lift your right hand and left leg off the floor  Hold this position  Slowly return to the starting position  Tighten your abdominal muscles and lift your left hand and right leg off the floor  Hold this position  Slowly return to the starting position  · Clam:  Lie on your side with your knees bent  Put your bottom arm under your head to keep your neck in line  Put your top hand on your hip to keep your pelvis from moving  Put your heels together, and keep them together during this exercise  Slowly raise your top knee toward the ceiling  Then lower your leg so your knees are together  Repeat this exercise 10 times  Then switch sides and do the exercise 10 times with the other leg  · Curl up:  Lie on your back with your knees bent and feet flat on the floor  Place your hands, palms down, underneath your lower back   Next, with your elbows on the floor, lift your shoulders and chest 2 to 3 inches off the floor  Keep your head in line with your shoulders  Hold this position  Slowly return to the starting position  · Straight leg raises:  Lie on your back with one leg straight  Bend the other knee and place your foot flat on the floor  Tighten your abdominal muscles  Keep your leg straight and slowly lift it straight up 6 to 12 inches off the floor  Hold this position  Lower your leg slowly  Do as many repetitions as directed on this side  Repeat with the other leg  · Plank:  Lie on your stomach  Bend your elbows and place your forearms flat on the floor  Lift your chest, stomach, and knees off the floor  Make sure your elbows are below your shoulders  Your body should be in a straight line  Do not let your hips or lower back sink to the ground  Squeeze your abdominal muscles together and hold for 15 seconds  To make this exercise harder, hold for 30 seconds or lift 1 leg at a time  · Bicycles:  Lie on your back  Bend both knees and bring them toward your chest  Your calves should be parallel to the floor  Place the palms of your hands on the back of your head  Straighten your right leg and keep it lifted 2 inches off the floor  Raise your head and shoulders off the floor and twist towards your left  Keep your head and shoulders lifted  Bend your right knee while you straighten your left leg  Keep your left leg 2 inches off the floor  Twist your head and chest towards the left leg  Continue to straighten 1 leg at a time and twist        Follow up with your doctor as directed:  Write down your questions so you remember to ask them during your visits  © Copyright [employer] 2021 Information is for End User's use only and may not be sold, redistributed or otherwise used for commercial purposes  All illustrations and images included in CareNotes® are the copyrighted property of [employer]  or [facility] [employer]   The above information is an  only  It is not intended as medical advice for individual conditions or treatments  Talk to your doctor, nurse or pharmacist before following any medical regimen to see if it is safe and effective for you

## 2022-01-20 NOTE — PROGRESS NOTES
Subjective:      Patient ID: Onofre Bean is a 57 y o  female  57 y o  female with hyperlipidemia, depression, anxiety, ADHD,  FREIDA, and history of gastric bypass who is here today for routine f/u visit and establish care, previously seen by Dr Spencer Bread are good on zoloft takes 50 -100 mg at bedtime       She takes her adderall XR on weekdays only and as needed additional Adderral 10 mg daily in afternoon as needed      Past Medical History:   Diagnosis Date    ADHD     Anxiety     Arthritis     Asthma     Breast cancer ([facility] ) 01/01/2012    Cancer ([facility] ) 8/12    Closed fracture of radial styloid     Endometriosis     Forceps delivery     1991 daughter    GERD (gastroesophageal reflux disease)     H/O mitral valve prolapse     no regurgitation    Hiatal hernia     History of radiation therapy     Hyperlipidemia     Infertility, female     Normal delivery     1998 daughter    Sleep apnea     on cpap    TB lung, latent     treated with INH (in her 29's)       Family History   Problem Relation Age of Onset    Colon cancer Mother 48    Cancer Mother     Stroke Father     No Known Problems Maternal Grandmother     Hypertension Family     Mitral valve prolapse Family     Osteoporosis Family     Varicose Veins Family     No Known Problems Sister     No Known Problems Daughter     No Known Problems Paternal Grandmother     No Known Problems Daughter     No Known Problems Paternal Aunt     Colon cancer Maternal Grandfather [de-identified]    No Known Problems Paternal Grandfather        Past Surgical History:   Procedure Laterality Date    BREAST BIOPSY Right 04/01/2012    biopsy breast percutaneous needle core    BREAST LUMPECTOMY Right 08/01/2012    CHOLECYSTECTOMY  01/23/2013    DILATION AND CURETTAGE OF UTERUS      ENDOMETRIAL BIOPSY      without cervical dilation    KNEE SURGERY      LAPAROSCOPY      Exploratory 1990 & 1994    SALUD-EN-Y PROCEDURE  01/23/2013    Dr Keren Cantrell GUIDANCE  5/14/2013        reports that she has never smoked  She has never used smokeless tobacco  She reports that she does not drink alcohol and does not use drugs  Current Outpatient Medications:     amphetamine-dextroamphetamine (ADDERALL XR) 20 MG 24 hr capsule, Take 1 capsule (20 mg total) by mouth every morning Max Daily Amount: 20 mg, Disp: 90 capsule, Rfl: 0    amphetamine-dextroamphetamine (ADDERALL, 10MG,) 10 mg tablet, Take 1 tablet (10 mg total) by mouth daily In afternoon as needed in addition to your XR 20 mg in AM Max Daily Amount: 10 mg, Disp: 30 tablet, Rfl: 0    Cholecalciferol (VITAMIN D) 2000 units CAPS, Take by mouth daily, Disp: , Rfl:     Multiple Vitamin (MULTI-VITAMIN DAILY) TABS, Take by mouth, Disp: , Rfl:     sertraline (ZOLOFT) 50 mg tablet, Take 1 tablet (50 mg total) by mouth daily at bedtime, Disp: 180 tablet, Rfl: 1    The following portions of the patient's history were reviewed and updated as appropriate: allergies, current medications, past family history, past medical history, past social history, past surgical history and problem list     Review of Systems   Constitutional: Negative for fatigue and fever  HENT: Negative for congestion, facial swelling, mouth sores, rhinorrhea, sore throat and trouble swallowing  Eyes: Negative for pain and redness  Respiratory: Negative for cough, shortness of breath and wheezing  Cardiovascular: Negative for chest pain, palpitations and leg swelling  Gastrointestinal: Negative for abdominal pain, blood in stool, constipation, diarrhea and nausea  Genitourinary: Negative for dysuria, hematuria and urgency  Musculoskeletal: Negative for arthralgias, back pain and myalgias  Skin: Negative for rash and wound  Neurological: Negative for seizures, syncope and headaches  Hematological: Negative for adenopathy  Psychiatric/Behavioral: Negative for agitation and behavioral problems           PHQ-2/9 Depression Screening    Little interest or pleasure in doing things: 0 - not at all  Feeling down, depressed, or hopeless: 0 - not at all  Trouble falling or staying asleep, or sleeping too much: 0 - not at all  Feeling tired or having little energy: 0 - not at all  Poor appetite or overeatin - not at all  Feeling bad about yourself - or that you are a failure or have let yourself or your family down: 0 - not at all  Trouble concentrating on things, such as reading the newspaper or watching television: 0 - not at all  Moving or speaking so slowly that other people could have noticed  Or the opposite - being so fidgety or restless that you have been moving around a lot more than usual: 0 - not at all  Thoughts that you would be better off dead, or of hurting yourself in some way: 0 - not at all  PHQ-9 Score: 0   PHQ-9 Interpretation: No or Minimal depression        HARDEEP-7 Flowsheet Screening      Most Recent Value   Over the last 2 weeks, how often have you been bothered by any of the following problems? Feeling nervous, anxious, or on edge 1   Not being able to stop or control worrying 0   Worrying too much about different things 0   Trouble relaxing 0   Being so restless that it is hard to sit still 0   Becoming easily annoyed or irritable 0   Feeling afraid as if something awful might happen 0   HARDEEP-7 Total Score 1            Objective:    /74 (BP Location: Left arm, Patient Position: Sitting, Cuff Size: Adult)   Pulse 68   Temp 97 7 °F (36 5 °C) (Temporal)   Resp 18   Ht 4' 11 5" (1 511 m)   Wt 64 9 kg (143 lb)   SpO2 98%   BMI 28 40 kg/m²      Physical Exam  Vitals and nursing note reviewed  Constitutional:       Appearance: Normal appearance  She is well-developed  She is not ill-appearing  HENT:      Head: Normocephalic and atraumatic        Right Ear: Tympanic membrane, ear canal and external ear normal       Left Ear: Tympanic membrane, ear canal and external ear normal       Nose: Nose normal  Mouth/Throat:      Mouth: Mucous membranes are moist       Pharynx: No oropharyngeal exudate or posterior oropharyngeal erythema  Eyes:      General: No scleral icterus  Right eye: No discharge  Left eye: No discharge  Conjunctiva/sclera: Conjunctivae normal    Cardiovascular:      Rate and Rhythm: Normal rate  Heart sounds: No murmur heard  No gallop  Pulmonary:      Effort: Pulmonary effort is normal  No respiratory distress  Breath sounds: Normal breath sounds  No stridor  No wheezing, rhonchi or rales  Abdominal:      Palpations: Abdomen is soft  Tenderness: There is no abdominal tenderness  Musculoskeletal:         General: No tenderness or deformity  Cervical back: Spasms present  Back:       Right lower leg: No edema  Left lower leg: No edema  Skin:     Findings: No erythema or rash  Neurological:      Mental Status: She is alert  Mental status is at baseline  Psychiatric:         Behavior: Behavior normal          Judgment: Judgment normal            No results found for this or any previous visit (from the past 2520 hour(s))  Laboratory Results: I have personally reviewed the pertinent laboratory results/reports     Radiology/Other Diagnostic Testing Results: I have personally reviewed pertinent reports  Mammo screening bilateral w 3d & cad    Result Date: 12/27/2021  DIAGNOSIS: Visit for screening mammogram TECHNIQUE: Digital screening mammography was performed  Computer Aided Detection (CAD) analyzed all applicable images  COMPARISONS: Prior breast imaging dated: 12/17/2020, 12/11/2019, 06/04/2018, 05/15/2017, and 05/12/2016, 05/11/2015 and 05/01/2014  RELEVANT HISTORY: Family Breast Cancer History: No known family history of breast cancer  Family Medical History: Family medical history includes colon cancer in 2 relatives (maternal grandfather, mother)  Personal History: Hormone history includes birth control   Surgical history includes breast biopsy and lumpectomy  Medical history includes breast cancer  The patient is scheduled in a reminder system for screening mammography  8-10% of cancers will be missed on mammography  Management of a palpable abnormality must be based on clinical grounds  Patients will be notified of their results via letter from our facility  Accredited by [employer] of [employer] and FDA  RISK ASSESSMENT: Surya risk assessment reporting was suppressed due to the patient's history and/or demographic factors  TISSUE DENSITY: The breasts are heterogeneously dense, which may obscure small masses  INDICATION: Maylin Lopez is a 57 y o  female presenting for screening mammography  FINDINGS: Bilateral There are no suspicious masses, grouped microcalcifications or areas of unexplained architectural distortion  The skin and nipple areolar complex are unremarkable  There are stable postsurgical changes in the right breast   There are some benign-appearing calcifications in both breasts  Findings are benign  No mammographic evidence of malignancy  ASSESSMENT/BI-RADS CATEGORY: Left: 2 - Benign Right: 2 - Benign Overall: 2 - Benign RECOMMENDATION:      - Routine screening mammogram in 1 year for both breasts  Workstation ID: [id number]       Assessment/Plan:       Diagnoses and all orders for this visit:    Attention deficit hyperactivity disorder (ADHD), unspecified ADHD type  -     amphetamine-dextroamphetamine (ADDERALL XR) 20 MG 24 hr capsule; Take 1 capsule (20 mg total) by mouth every morning Max Daily Amount: 20 mg  -     amphetamine-dextroamphetamine (ADDERALL, 10MG,) 10 mg tablet; Take 1 tablet (10 mg total) by mouth daily In afternoon as needed in addition to your XR 20 mg in AM Max Daily Amount: 10 mg  -     CBC and differential; Future  -     Comprehensive metabolic panel; Future    Mixed anxiety and depressive disorder  -     sertraline (ZOLOFT) 50 mg tablet;  Take 1 tablet (50 mg total) by mouth daily at bedtime  -     CBC and differential; Future  -     Comprehensive metabolic panel; Future    Elevated LDL cholesterol level    Encounter for lipid screening for cardiovascular disease  -     Lipid panel; Future    Thyroid disorder screening  -     TSH, 3rd generation with Free T4 reflex; Future    Overweight with body mass index (BMI) of 28 to 28 9 in adult    Strain of left trapezius muscle, initial encounter      BMI Counseling: Body mass index is 28 4 kg/m²  The BMI is above normal  Nutrition recommendations include decreasing portion sizes, encouraging healthy choices of fruits and vegetables, decreasing fast food intake, consuming healthier snacks, limiting drinks that contain sugar, moderation in carbohydrate intake and reducing intake of cholesterol  Exercise recommendations include moderate physical activity 150 minutes/week  No pharmacotherapy was ordered  Rationale for BMI follow-up plan is due to patient being overweight or obese  Check annual labs as above  Advised more consistent dosing for sertraline- likely 100 mg hs, continue adderral at current dose, refils done  Massage and heating pad , massage, good breast support, upper back stretches prn for pain in trapezius area, upper back brace will help  F/u in 5 months    Read package inserts for all medications before starting a new medications, call me if you have any questions  Patient was given opportunity to ask questions and all questions were answered  Portions of the record may have been created with voice recognition software  Occasional wrong word or "sound a like" substitutions may have occurred due to the inherent limitations of voice recognition software  Read the chart carefully and recognize, using context, where substitutions have occurred

## 2022-01-24 ENCOUNTER — TELEPHONE (OUTPATIENT)
Dept: FAMILY MEDICINE CLINIC | Facility: CLINIC | Age: 62
End: 2022-01-24

## 2022-01-24 NOTE — TELEPHONE ENCOUNTER
[facility] calling to get clarification on Zoloft directions  Patient use to take them 2 x day, but they received an order for #180 but says 1 at bed   ?

## 2022-01-27 ENCOUNTER — APPOINTMENT (OUTPATIENT)
Dept: LAB | Age: 62
End: 2022-01-27
Payer: COMMERCIAL

## 2022-01-27 DIAGNOSIS — Z13.29 THYROID DISORDER SCREENING: ICD-10-CM

## 2022-01-27 DIAGNOSIS — Z13.220 ENCOUNTER FOR LIPID SCREENING FOR CARDIOVASCULAR DISEASE: ICD-10-CM

## 2022-01-27 DIAGNOSIS — F90.9 ATTENTION DEFICIT HYPERACTIVITY DISORDER (ADHD), UNSPECIFIED ADHD TYPE: ICD-10-CM

## 2022-01-27 DIAGNOSIS — Z13.6 ENCOUNTER FOR LIPID SCREENING FOR CARDIOVASCULAR DISEASE: ICD-10-CM

## 2022-01-27 DIAGNOSIS — F41.8 MIXED ANXIETY AND DEPRESSIVE DISORDER: ICD-10-CM

## 2022-01-27 LAB
ALBUMIN SERPL BCP-MCNC: 4.3 G/DL (ref 3.5–5)
ALP SERPL-CCNC: 95 U/L (ref 46–116)
ALT SERPL W P-5'-P-CCNC: 29 U/L (ref 12–78)
ANION GAP SERPL CALCULATED.3IONS-SCNC: 2 MMOL/L (ref 4–13)
AST SERPL W P-5'-P-CCNC: 22 U/L (ref 5–45)
BASOPHILS # BLD AUTO: 0.04 THOUSANDS/ΜL (ref 0–0.1)
BASOPHILS NFR BLD AUTO: 1 % (ref 0–1)
BILIRUB SERPL-MCNC: 1.48 MG/DL (ref 0.2–1)
BUN SERPL-MCNC: 16 MG/DL (ref 5–25)
CALCIUM SERPL-MCNC: 11.1 MG/DL (ref 8.3–10.1)
CHLORIDE SERPL-SCNC: 104 MMOL/L (ref 100–108)
CHOLEST SERPL-MCNC: 227 MG/DL
CO2 SERPL-SCNC: 30 MMOL/L (ref 21–32)
CREAT SERPL-MCNC: 0.77 MG/DL (ref 0.6–1.3)
EOSINOPHIL # BLD AUTO: 0.04 THOUSAND/ΜL (ref 0–0.61)
EOSINOPHIL NFR BLD AUTO: 1 % (ref 0–6)
ERYTHROCYTE [DISTWIDTH] IN BLOOD BY AUTOMATED COUNT: 12.3 % (ref 11.6–15.1)
GFR SERPL CREATININE-BSD FRML MDRD: 83 ML/MIN/1.73SQ M
GLUCOSE P FAST SERPL-MCNC: 80 MG/DL (ref 65–99)
HCT VFR BLD AUTO: 41.7 % (ref 34.8–46.1)
HDLC SERPL-MCNC: 70 MG/DL
HGB BLD-MCNC: 13.5 G/DL (ref 11.5–15.4)
IMM GRANULOCYTES # BLD AUTO: 0 THOUSAND/UL (ref 0–0.2)
IMM GRANULOCYTES NFR BLD AUTO: 0 % (ref 0–2)
LDLC SERPL CALC-MCNC: 140 MG/DL (ref 0–100)
LYMPHOCYTES # BLD AUTO: 2.01 THOUSANDS/ΜL (ref 0.6–4.47)
LYMPHOCYTES NFR BLD AUTO: 34 % (ref 14–44)
MCH RBC QN AUTO: 30.3 PG (ref 26.8–34.3)
MCHC RBC AUTO-ENTMCNC: 32.4 G/DL (ref 31.4–37.4)
MCV RBC AUTO: 94 FL (ref 82–98)
MONOCYTES # BLD AUTO: 0.48 THOUSAND/ΜL (ref 0.17–1.22)
MONOCYTES NFR BLD AUTO: 8 % (ref 4–12)
NEUTROPHILS # BLD AUTO: 3.31 THOUSANDS/ΜL (ref 1.85–7.62)
NEUTS SEG NFR BLD AUTO: 56 % (ref 43–75)
NONHDLC SERPL-MCNC: 157 MG/DL
NRBC BLD AUTO-RTO: 0 /100 WBCS
PLATELET # BLD AUTO: 191 THOUSANDS/UL (ref 149–390)
PMV BLD AUTO: 10 FL (ref 8.9–12.7)
POTASSIUM SERPL-SCNC: 3.7 MMOL/L (ref 3.5–5.3)
PROT SERPL-MCNC: 7.4 G/DL (ref 6.4–8.2)
RBC # BLD AUTO: 4.46 MILLION/UL (ref 3.81–5.12)
SODIUM SERPL-SCNC: 136 MMOL/L (ref 136–145)
T4 FREE SERPL-MCNC: 0.9 NG/DL (ref 0.76–1.46)
TRIGL SERPL-MCNC: 85 MG/DL
TSH SERPL DL<=0.05 MIU/L-ACNC: 4.54 UIU/ML (ref 0.36–3.74)
WBC # BLD AUTO: 5.88 THOUSAND/UL (ref 4.31–10.16)

## 2022-01-27 PROCEDURE — 80053 COMPREHEN METABOLIC PANEL: CPT

## 2022-01-27 PROCEDURE — 36415 COLL VENOUS BLD VENIPUNCTURE: CPT

## 2022-01-27 PROCEDURE — 84439 ASSAY OF FREE THYROXINE: CPT

## 2022-01-27 PROCEDURE — 85025 COMPLETE CBC W/AUTO DIFF WBC: CPT

## 2022-01-27 PROCEDURE — 80061 LIPID PANEL: CPT

## 2022-01-27 PROCEDURE — 84443 ASSAY THYROID STIM HORMONE: CPT

## 2022-02-07 ENCOUNTER — HOSPITAL ENCOUNTER (OUTPATIENT)
Dept: RADIOLOGY | Age: 62
Discharge: HOME | End: 2022-02-07
Payer: COMMERCIAL

## 2022-02-07 ENCOUNTER — APPOINTMENT (OUTPATIENT)
Dept: LAB | Age: 62
End: 2022-02-07
Payer: COMMERCIAL

## 2022-02-07 DIAGNOSIS — R79.89 ELEVATED TSH: ICD-10-CM

## 2022-02-07 DIAGNOSIS — R17 ELEVATED BILIRUBIN: ICD-10-CM

## 2022-02-07 LAB
T3FREE SERPL-MCNC: 2.39 PG/ML (ref 2.3–4.2)
T4 FREE SERPL-MCNC: 0.83 NG/DL (ref 0.76–1.46)
TSH SERPL DL<=0.05 MIU/L-ACNC: 4.67 UIU/ML (ref 0.36–3.74)

## 2022-02-07 PROCEDURE — 36415 COLL VENOUS BLD VENIPUNCTURE: CPT

## 2022-02-07 PROCEDURE — 84443 ASSAY THYROID STIM HORMONE: CPT

## 2022-02-07 PROCEDURE — 76705 ECHO EXAM OF ABDOMEN: CPT

## 2022-02-07 PROCEDURE — 84481 FREE ASSAY (FT-3): CPT

## 2022-02-07 PROCEDURE — 84439 ASSAY OF FREE THYROXINE: CPT

## 2022-02-14 ENCOUNTER — OFFICE VISIT (OUTPATIENT)
Dept: GASTROENTEROLOGY | Facility: AMBULARY SURGERY CENTER | Age: 62
End: 2022-02-14
Payer: COMMERCIAL

## 2022-02-14 VITALS
DIASTOLIC BLOOD PRESSURE: 80 MMHG | HEIGHT: 60 IN | WEIGHT: 143 LBS | SYSTOLIC BLOOD PRESSURE: 132 MMHG | BODY MASS INDEX: 28.07 KG/M2 | HEART RATE: 80 BPM | OXYGEN SATURATION: 98 %

## 2022-02-14 DIAGNOSIS — Z00.00 HEALTHCARE MAINTENANCE: ICD-10-CM

## 2022-02-14 DIAGNOSIS — R13.10 DYSPHAGIA, UNSPECIFIED TYPE: ICD-10-CM

## 2022-02-14 DIAGNOSIS — Z80.0 FAMILY HISTORY OF COLON CANCER: Primary | ICD-10-CM

## 2022-02-14 PROCEDURE — 99244 OFF/OP CNSLTJ NEW/EST MOD 40: CPT | Performed by: INTERNAL MEDICINE

## 2022-02-14 RX ORDER — SODIUM PICOSULFATE, MAGNESIUM OXIDE, AND ANHYDROUS CITRIC ACID 10; 3.5; 12 MG/160ML; G/160ML; G/160ML
1 LIQUID ORAL SEE ADMIN INSTRUCTIONS
Qty: 320 ML | Refills: 0 | Status: SHIPPED | OUTPATIENT
Start: 2022-02-14 | End: 2022-03-17

## 2022-02-14 NOTE — ASSESSMENT & PLAN NOTE
Patient is at increased risk because of family history of colon cancer  Rule out colorectal lesions including polyps or malignancy     -Schedule for colonoscopy  -High-fiber diet     -Patient was given instructions about the colonoscopy prep     -Patient was explained about  the risks and benefits of the procedure  Risks including but not limited to bleeding, infection, perforation were explained in detail  Also explained about less than 100% sensitivity with the exam and other alternatives

## 2022-02-14 NOTE — PROGRESS NOTES
Consultation - [facility]  Christelle Brown 1960 female         Chief Complaint:  For colonoscopy    HPI:  18-year-old female with family history of colon cancer was referred for colonoscopy  Patient has history of breast cancer status post lumpectomy, anxiety and gastric bypass surgery in 2012  She had last colonoscopy in 2016  Regular bowel movements and denies any blood or mucus in the stool  Good appetite, no recent weight loss  Denies any heartburn acid reflux  Complaining about difficulty swallowing and feels like food gets stuck at times  Patient's mother and grandfather had colon cancer    REVIEW OF SYSTEMS: Review of Systems   Constitutional: Negative for activity change, appetite change, chills, diaphoresis, fatigue, fever and unexpected weight change  HENT: Negative for ear discharge, ear pain, facial swelling, hearing loss, nosebleeds, sore throat, tinnitus and voice change  Eyes: Negative for pain, discharge, redness, itching and visual disturbance  Respiratory: Negative for apnea, cough, chest tightness, shortness of breath and wheezing  Cardiovascular: Negative for chest pain and palpitations  Gastrointestinal:        As noted in HPI   Endocrine: Negative for cold intolerance, heat intolerance and polyuria  Genitourinary: Negative for difficulty urinating, dysuria, flank pain, hematuria and urgency  Musculoskeletal: Negative for arthralgias, back pain, gait problem, joint swelling and myalgias  Skin: Negative for rash and wound  Neurological: Negative for dizziness, tremors, seizures, speech difficulty, light-headedness, numbness and headaches  Hematological: Negative for adenopathy  Does not bruise/bleed easily  Psychiatric/Behavioral: Negative for agitation, behavioral problems and confusion  The patient is not nervous/anxious           Past Medical History:   Diagnosis Date    ADHD     Anxiety     Arthritis     Asthma     [facility] ) 01/01/2012    Cancer ([facility] ) 8/12    Closed fracture of radial styloid     Endometriosis     Forceps delivery     1991 daughter    GERD (gastroesophageal reflux disease)     H/O mitral valve prolapse     no regurgitation    Hiatal hernia     History of radiation therapy     Hyperlipidemia     Infertility, female     Normal delivery     1998 daughter    Sleep apnea     on cpap    TB lung, latent     treated with INH (in her 29's)      Past Surgical History:   Procedure Laterality Date    BREAST BIOPSY Right 04/01/2012    biopsy breast percutaneous needle core    BREAST LUMPECTOMY Right 08/01/2012    CHOLECYSTECTOMY  01/23/2013    COLONOSCOPY      DILATION AND CURETTAGE OF UTERUS      ENDOMETRIAL BIOPSY      without cervical dilation    KNEE SURGERY      LAPAROSCOPY      Exploratory 1990 & 1994    SALUD-EN-Y PROCEDURE  01/23/2013    Dr Mariah Norris  5/14/2013     Social History     Socioeconomic History    Marital status:      Spouse name: Not on file    Number of children: 2    Years of education: Not on file    Highest education level: Not on file   Occupational History    Not on file   Tobacco Use    Smoking status: Never Smoker    Smokeless tobacco: Never Used   Vaping Use    Vaping Use: Never used   Substance and Sexual Activity    Alcohol use: Never     Alcohol/week: 0 0 standard drinks    Drug use: Never    Sexual activity: Yes     Partners: Male   Other Topics Concern    Not on file   Social History Narrative        2 children    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, ceramics     Social Determinants of Health     Financial Resource Strain: Not on file   Food Insecurity: Not on file   Transportation Needs: Not on file   Physical Activity: Not on file   Stress: Not on file   Social Connections: Not on file   Intimate Partner Violence: Not on file   Housing Stability: Not on file     Family History   Problem Relation Age of Onset    Colon cancer Mother 48    Cancer Mother     Stroke Father     No Known Problems Maternal Grandmother     Hypertension Family     Mitral valve prolapse Family     Osteoporosis Family     Varicose Veins Family     No Known Problems Sister     No Known Problems Daughter     No Known Problems Paternal Grandmother     No Known Problems Daughter     No Known Problems Paternal Aunt     Colon cancer Maternal Grandfather [de-identified]    No Known Problems Paternal Grandfather      Patient has no known allergies  Current Outpatient Medications   Medication Sig Dispense Refill    amphetamine-dextroamphetamine (ADDERALL XR) 20 MG 24 hr capsule Take 1 capsule (20 mg total) by mouth every morning Max Daily Amount: 20 mg 90 capsule 0    amphetamine-dextroamphetamine (ADDERALL, 10MG,) 10 mg tablet Take 1 tablet (10 mg total) by mouth daily In afternoon as needed in addition to your XR 20 mg in AM Max Daily Amount: 10 mg 30 tablet 0    Cholecalciferol (VITAMIN D) 2000 units CAPS Take by mouth daily      Multiple Vitamin (MULTI-VITAMIN DAILY) TABS Take by mouth      sertraline (ZOLOFT) 50 mg tablet Take 1 tablet (50 mg total) by mouth daily at bedtime 180 tablet 1    Sod Picosulfate-Mag Ox-Cit Acd (Clenpiq) 10-3 5-12 MG-GM -GM/160ML SOLN Take 1 Package by mouth see administration instructions 320 mL 0     No current facility-administered medications for this visit  Blood pressure 132/80, pulse 80, height 5' (1 524 m), weight 64 9 kg (143 lb), SpO2 98 %, not currently breastfeeding  PHYSICAL EXAM: Physical Exam  Constitutional:       Appearance: Normal appearance  She is well-developed  HENT:      Head: Normocephalic and atraumatic  Nose: Nose normal    Eyes:      Conjunctiva/sclera: Conjunctivae normal    Neck:      Thyroid: No thyromegaly  Vascular: No JVD  Trachea: No tracheal deviation     Cardiovascular:      Rate and Rhythm: Normal rate and regular rhythm  Heart sounds: Normal heart sounds  No murmur heard  No friction rub  No gallop  Pulmonary:      Effort: Pulmonary effort is normal  No respiratory distress  Breath sounds: Normal breath sounds  No wheezing or rales  Abdominal:      General: Bowel sounds are normal  There is no distension  Palpations: Abdomen is soft  There is no mass  Tenderness: There is no abdominal tenderness  There is no guarding  Hernia: No hernia is present  Musculoskeletal:         General: No tenderness or deformity  Cervical back: Neck supple  Right lower leg: No edema  Left lower leg: No edema  Lymphadenopathy:      Cervical: No cervical adenopathy  Skin:     General: Skin is warm and dry  Findings: No erythema or rash  Neurological:      Mental Status: She is alert and oriented to person, place, and time  Psychiatric:         Mood and Affect: Mood normal          Behavior: Behavior normal          Thought Content: Thought content normal           Lab Results   Component Value Date    WBC 5 88 01/27/2022    HGB 13 5 01/27/2022    HCT 41 7 01/27/2022    MCV 94 01/27/2022     01/27/2022     Lab Results   Component Value Date    CALCIUM 11 1 (H) 01/27/2022    K 3 7 01/27/2022    CO2 30 01/27/2022     01/27/2022    BUN 16 01/27/2022    CREATININE 0 77 01/27/2022     Lab Results   Component Value Date    ALT 29 01/27/2022    AST 22 01/27/2022    ALKPHOS 95 01/27/2022     No results found for: INR, PROTIME    US right upper quadrant    Result Date: 2/12/2022  Impression: Normal  Workstation performed: [id number]       ASSESSMENT & PLAN:    Family history of colon cancer  Patient is at increased risk because of family history of colon cancer  Rule out colorectal lesions including polyps or malignancy     -Schedule for colonoscopy      -High-fiber diet     -Patient was given instructions about the colonoscopy prep     -Patient was explained about  the risks and benefits of the procedure  Risks including but not limited to bleeding, infection, perforation were explained in detail  Also explained about less than 100% sensitivity with the exam and other alternatives  Dysphagia  Possible from Schatzki's ring or peptic stricture    Rule out other lesions    -advised to chew well before swallowing    -schedule for EGD

## 2022-02-14 NOTE — ASSESSMENT & PLAN NOTE
Possible from Schatzki's ring or peptic stricture    Rule out other lesions    -advised to chew well before swallowing    -schedule for EGD

## 2022-03-23 ENCOUNTER — APPOINTMENT (OUTPATIENT)
Dept: LAB | Age: 62
End: 2022-03-23

## 2022-03-23 DIAGNOSIS — Z00.8 ENCOUNTER FOR OTHER GENERAL EXAMINATION: ICD-10-CM

## 2022-03-23 LAB
EST. AVERAGE GLUCOSE BLD GHB EST-MCNC: 97 MG/DL
HBA1C MFR BLD: 5 %

## 2022-03-23 PROCEDURE — 36415 COLL VENOUS BLD VENIPUNCTURE: CPT

## 2022-03-23 PROCEDURE — 83036 HEMOGLOBIN GLYCOSYLATED A1C: CPT

## 2022-03-25 ENCOUNTER — ANESTHESIA (OUTPATIENT)
Dept: GASTROENTEROLOGY | Facility: AMBULARY SURGERY CENTER | Age: 62
End: 2022-03-25

## 2022-03-25 ENCOUNTER — ANESTHESIA EVENT (OUTPATIENT)
Dept: GASTROENTEROLOGY | Facility: AMBULARY SURGERY CENTER | Age: 62
End: 2022-03-25

## 2022-03-25 ENCOUNTER — HOSPITAL ENCOUNTER (OUTPATIENT)
Dept: GASTROENTEROLOGY | Facility: AMBULARY SURGERY CENTER | Age: 62
Setting detail: OUTPATIENT SURGERY
Discharge: HOME | End: 2022-03-25
Attending: INTERNAL MEDICINE | Admitting: INTERNAL MEDICINE
Payer: COMMERCIAL

## 2022-03-25 VITALS
TEMPERATURE: 97.1 F | SYSTOLIC BLOOD PRESSURE: 121 MMHG | RESPIRATION RATE: 21 BRPM | OXYGEN SATURATION: 98 % | DIASTOLIC BLOOD PRESSURE: 56 MMHG | HEART RATE: 68 BPM

## 2022-03-25 DIAGNOSIS — R13.10 DYSPHAGIA, UNSPECIFIED TYPE: ICD-10-CM

## 2022-03-25 DIAGNOSIS — Z80.0 FAMILY HISTORY OF COLON CANCER: ICD-10-CM

## 2022-03-25 PROCEDURE — 88305 TISSUE EXAM BY PATHOLOGIST: CPT | Performed by: PATHOLOGY

## 2022-03-25 PROCEDURE — G0105 COLORECTAL SCRN; HI RISK IND: HCPCS | Performed by: INTERNAL MEDICINE

## 2022-03-25 PROCEDURE — 43248 EGD GUIDE WIRE INSERTION: CPT | Performed by: INTERNAL MEDICINE

## 2022-03-25 PROCEDURE — 43239 EGD BIOPSY SINGLE/MULTIPLE: CPT | Performed by: INTERNAL MEDICINE

## 2022-03-25 RX ORDER — SODIUM CHLORIDE, SODIUM LACTATE, POTASSIUM CHLORIDE, CALCIUM CHLORIDE 600; 310; 30; 20 MG/100ML; MG/100ML; MG/100ML; MG/100ML
INJECTION, SOLUTION INTRAVENOUS CONTINUOUS PRN
Status: DISCONTINUED | OUTPATIENT
Start: 2022-03-25 | End: 2022-03-25

## 2022-03-25 RX ORDER — PROPOFOL 10 MG/ML
INJECTION, EMULSION INTRAVENOUS CONTINUOUS PRN
Status: DISCONTINUED | OUTPATIENT
Start: 2022-03-25 | End: 2022-03-25

## 2022-03-25 RX ORDER — SODIUM CHLORIDE, SODIUM LACTATE, POTASSIUM CHLORIDE, CALCIUM CHLORIDE 600; 310; 30; 20 MG/100ML; MG/100ML; MG/100ML; MG/100ML
125 INJECTION, SOLUTION INTRAVENOUS CONTINUOUS
Status: DISCONTINUED | OUTPATIENT
Start: 2022-03-25 | End: 2022-03-29 | Stop reason: HOSPADM

## 2022-03-25 RX ORDER — ONDANSETRON 2 MG/ML
4 INJECTION INTRAMUSCULAR; INTRAVENOUS ONCE AS NEEDED
Status: CANCELLED | OUTPATIENT
Start: 2022-03-25

## 2022-03-25 RX ORDER — PROPOFOL 10 MG/ML
INJECTION, EMULSION INTRAVENOUS AS NEEDED
Status: DISCONTINUED | OUTPATIENT
Start: 2022-03-25 | End: 2022-03-25

## 2022-03-25 RX ORDER — LIDOCAINE HYDROCHLORIDE 10 MG/ML
INJECTION, SOLUTION EPIDURAL; INFILTRATION; INTRACAUDAL; PERINEURAL AS NEEDED
Status: DISCONTINUED | OUTPATIENT
Start: 2022-03-25 | End: 2022-03-25

## 2022-03-25 RX ADMIN — SODIUM CHLORIDE, SODIUM LACTATE, POTASSIUM CHLORIDE, AND CALCIUM CHLORIDE 125 ML/HR: .6; .31; .03; .02 INJECTION, SOLUTION INTRAVENOUS at 11:23

## 2022-03-25 RX ADMIN — LIDOCAINE HYDROCHLORIDE 50 MG: 10 INJECTION, SOLUTION EPIDURAL; INFILTRATION; INTRACAUDAL; PERINEURAL at 12:47

## 2022-03-25 RX ADMIN — PROPOFOL 100 MG: 10 INJECTION, EMULSION INTRAVENOUS at 12:47

## 2022-03-25 RX ADMIN — SODIUM CHLORIDE, SODIUM LACTATE, POTASSIUM CHLORIDE, AND CALCIUM CHLORIDE: .6; .31; .03; .02 INJECTION, SOLUTION INTRAVENOUS at 12:40

## 2022-03-25 RX ADMIN — PROPOFOL 100 MCG/KG/MIN: 10 INJECTION, EMULSION INTRAVENOUS at 12:47

## 2022-03-25 NOTE — ANESTHESIA POSTPROCEDURE EVALUATION
Post-Op Assessment Note    CV Status:  Stable  Pain Score: 0    Pain management: adequate     Mental Status:  Awake   Hydration Status:  Stable   PONV Controlled:  None   Airway Patency:  Patent      Post Op Vitals Reviewed: Yes      Staff: CRNA   Comments: spontaneously breathing, HOB @ 30 degrees, protecting airway, vss, fully endorsed to recovery w/o AC        No complications documented      BP      Temp     Pulse     Resp      SpO2   99

## 2022-03-25 NOTE — H&P
History and Physical -  [facility]  Christelle Brown 57 y o  female MRN: [de-identified]        HPI:  40-year-old female with history of anxiety, family history of colon cancer in mother and grandfather reports having occasional difficulty swallowing      Historical Information   Past Medical History:   Diagnosis Date    ADHD     Anxiety     Arthritis     Asthma     [facility] ) 01/01/2012    [facility] ) [facility]2012    DCIS-right breast    Closed fracture of radial styloid     CPAP (continuous positive airway pressure) dependence     Endometriosis     Family history of colon cancer in mother    Sharona Rhody Forceps delivery     1991 daughter    GERD (gastroesophageal reflux disease)     H/O mitral valve prolapse     no regurgitation    Hiatal hernia     History of radiation therapy     Hyperlipidemia     Infertility, female     Normal delivery     1998 daughter    Sleep apnea     on cpap    TB lung, latent     treated with INH (in her 29's)     Past Surgical History:   Procedure Laterality Date    BREAST BIOPSY Right 04/01/2012    biopsy breast percutaneous needle core    BREAST LUMPECTOMY Right 08/01/2012    CHOLECYSTECTOMY  01/23/2013    COLONOSCOPY      DILATION AND CURETTAGE OF UTERUS      ENDOMETRIAL BIOPSY      without cervical dilation    KNEE ARTHROSCOPY      Plica syndrome    LAPAROSCOPY      Exploratory 1990 & 1994    SALUD-EN-Y PROCEDURE  01/23/2013    Dr Cortes Victor  5/14/2013     Social History   Social History     Substance and Sexual Activity   Alcohol Use Yes    Alcohol/week: 0 0 standard drinks    Comment: rarely     Social History     Substance and Sexual Activity   Drug Use Never     Social History     Tobacco Use   Smoking Status Never Smoker   Smokeless Tobacco Never Used     Family History   Problem Relation Age of Onset    Colon cancer Mother 48    Cancer Mother         colon    Stroke Father     Hypertension Father     No Known Problems Maternal Grandmother     Hypertension Family     Mitral valve prolapse Family     Osteoporosis Family     Varicose Veins Family     No Known Problems Sister     No Known Problems Daughter     Hypertension Paternal Grandmother     Stroke Paternal Grandmother     No Known Problems Daughter     No Known Problems Paternal Aunt     Colon cancer Maternal Grandfather [de-identified]    Cancer Maternal Grandfather         colon    No Known Problems Paternal Grandfather        Meds/Allergies     (Not in a hospital admission)      No Known Allergies    Objective     Blood pressure 128/92, pulse 57, temperature (!) 97 1 °F (36 2 °C), temperature source Temporal, resp  rate 18, SpO2 99 %, not currently breastfeeding      PHYSICAL EXAM:    Gen: NAD  CV: S1 & S2 normal, RRR  CHEST: Clear to auscultate  ABD: soft, NT/ND, good bowel sounds  EXT: no edema    ASSESSMENT:     Dysphagia, family history of colon cancer    PLAN:    EGD and colonoscopy

## 2022-03-25 NOTE — ANESTHESIA PREPROCEDURE EVALUATION
Procedure:  COLONOSCOPY  EGD    Relevant Problems   ANESTHESIA (within normal limits)      CARDIO   (+) Mixed hyperlipidemia      GI/HEPATIC   (+) Dysphagia      NEURO/PSYCH   (+) History of ductal carcinoma in situ (DCIS) of breast   (+) Mixed anxiety and depressive disorder      PULMONARY   (+) Obstructive sleep apnea        Physical Exam    Airway    Mallampati score: II  TM Distance: >3 FB  Neck ROM: full     Dental   No notable dental hx     Cardiovascular  Rhythm: regular, Rate: normal,     Pulmonary  Breath sounds clear to auscultation,     Other Findings        Anesthesia Plan  ASA Score- 2     Anesthesia Type- IV sedation with anesthesia with ASA Monitors  Additional Monitors:   Airway Plan:           Plan Factors-Exercise tolerance (METS): >4 METS  Chart reviewed  EKG reviewed  Existing labs reviewed  Patient summary reviewed  Patient is not a current smoker  Induction-     Postoperative Plan-     Informed Consent- Anesthetic plan and risks discussed with patient  I personally reviewed this patient with the CRNA  Discussed and agreed on the Anesthesia Plan with the CRNA  Mando Camarillo

## 2022-03-30 ENCOUNTER — OFFICE VISIT (OUTPATIENT)
Dept: OBGYN CLINIC | Facility: CLINIC | Age: 62
End: 2022-03-30
Payer: COMMERCIAL

## 2022-03-30 VITALS
SYSTOLIC BLOOD PRESSURE: 121 MMHG | BODY MASS INDEX: 27.48 KG/M2 | DIASTOLIC BLOOD PRESSURE: 67 MMHG | HEART RATE: 78 BPM | HEIGHT: 60 IN | WEIGHT: 140 LBS

## 2022-03-30 DIAGNOSIS — M77.8 RIGHT WRIST TENDINITIS: Primary | ICD-10-CM

## 2022-03-30 PROCEDURE — 20550 NJX 1 TENDON SHEATH/LIGAMENT: CPT | Performed by: SURGERY

## 2022-03-30 PROCEDURE — 99214 OFFICE O/P EST MOD 30 MIN: CPT | Performed by: SURGERY

## 2022-03-30 RX ORDER — DEXAMETHASONE SODIUM PHOSPHATE 100 MG/10ML
40 INJECTION INTRAMUSCULAR; INTRAVENOUS
Status: COMPLETED | OUTPATIENT
Start: 2022-03-30 | End: 2022-03-30

## 2022-03-30 RX ORDER — LIDOCAINE HYDROCHLORIDE 10 MG/ML
1 INJECTION, SOLUTION INFILTRATION; PERINEURAL
Status: COMPLETED | OUTPATIENT
Start: 2022-03-30 | End: 2022-03-30

## 2022-03-30 RX ADMIN — LIDOCAINE HYDROCHLORIDE 1 ML: 10 INJECTION, SOLUTION INFILTRATION; PERINEURAL at 17:23

## 2022-03-30 RX ADMIN — DEXAMETHASONE SODIUM PHOSPHATE 40 MG: 100 INJECTION INTRAMUSCULAR; INTRAVENOUS at 17:23

## 2022-03-30 NOTE — PROGRESS NOTES
ASSESSMENT/PLAN:      57 y o  female s/p distal radius fracture treated non operatively, DOI 2/18/21 and right wrist ECU tendinitis  The etiology of ECU tendinitis was discussed along with treatment options  We discussed a repeat right wrist ECU CSI combined with OT  Reza Hernandez elected to proceed with a right wrist ECU CSI  The CSI was performed in the office without complication  Post injection protocol/expectations were reviewed  OT was ordered for ECU exercises/modalties  If symptoms fail to improve a ultrasound may be ordered to evalaute for longitudinal tear vs subluxation, at which point surgical intervention may be warranted  Discussed using a vertical mouse and a neutral keyboard at work  Follow up in 6 weeks time for re-evaluation  The patient verbalized understanding of exam findings and treatment plan  We engaged in the shared decision-making process and treatment options were discussed at length with the patient  Surgical and conservative management discussed today along with risks and benefits  Diagnoses and all orders for this visit:    Right wrist tendinitis  -     Ambulatory Referral to PT/OT Hand Therapy; Future  -     Hand/upper extremity injection: R wrist      Follow Up:  Return in about 8 weeks (around 5/25/2022)  To Do Next Visit:  Re-evaluation of current issue    ____________________________________________________________________________________________________________________________________________      CHIEF COMPLAINT:  Chief Complaint   Patient presents with    Right Wrist - Pain       SUBJECTIVE:  Shell Dawn is a 57y o  year old RHD female who presents to the office today for right wrist pain  Reza Hernandez was previously treating with Dr Kay Hinkle for a right distal radius fracture, DOI 2/18/21  Her fracture was treated non operatively in a cast  After injury she notes a burning sensation to her dorsal right wrist  Burning sensation does not extend into her fingers   She notes this is intermittent in nature  Denies any aggravating factors  She underwent a right wrist ECU CSI with Dr Amanda Flores on 6/8/21 and states that this did help with the burning pain  She will take OTC pain medication on an as needed basis  I have personally reviewed all the relevant PMH, PSH, SH, FH, Medications and allergies       PAST MEDICAL HISTORY:  Past Medical History:   Diagnosis Date    ADHD     Anxiety     Arthritis     Asthma     Breast cancer ([facility] [address] ) 01/01/2012    Cancer ([facility] [address] ) 08/2012    DCIS-right breast    Closed fracture of radial styloid     CPAP (continuous positive airway pressure) dependence     Endometriosis     Family history of colon cancer in mother    [facility] Forceps delivery     1991 daughter    GERD (gastroesophageal reflux disease)     H/O mitral valve prolapse     no regurgitation    Hiatal hernia     History of radiation therapy     Hyperlipidemia     Infertility, female     Normal delivery     1998 daughter    Sleep apnea     on cpap    TB lung, latent     treated with INH (in her 29's)       PAST SURGICAL HISTORY:  Past Surgical History:   Procedure Laterality Date    BREAST BIOPSY Right 04/01/2012    biopsy breast percutaneous needle core    BREAST LUMPECTOMY Right 08/01/2012    CHOLECYSTECTOMY  01/23/2013    COLONOSCOPY      DILATION AND CURETTAGE OF UTERUS      ENDOMETRIAL BIOPSY      without cervical dilation    KNEE ARTHROSCOPY      Plica syndrome    LAPAROSCOPY      Exploratory 1990 & 1994    SALUD-EN-Y PROCEDURE  01/23/2013    Dr Sandhu Patch  5/14/2013       FAMILY HISTORY:  Family History   Problem Relation Age of Onset    Colon cancer Mother 48    Cancer Mother         colon    Osteoporosis Mother     Stroke Father     Hypertension Father     No Known Problems Maternal Grandmother     Hypertension Family     Mitral valve prolapse Family     Osteoporosis Family     Varicose Veins Family     No Known Problems Sister     No Known Problems Daughter     Hypertension Paternal Grandmother     Stroke Paternal Grandmother     No Known Problems Daughter     No Known Problems Paternal Aunt     Colon cancer Maternal Grandfather [de-identified]    Cancer Maternal Grandfather         colon    No Known Problems Paternal Grandfather        SOCIAL HISTORY:  Social History     Tobacco Use    Smoking status: Never Smoker    Smokeless tobacco: Never Used   Vaping Use    Vaping Use: Never used   Substance Use Topics    Alcohol use: Yes     Alcohol/week: 0 0 standard drinks     Comment: rarely    Drug use: Never       MEDICATIONS:    Current Outpatient Medications:     amphetamine-dextroamphetamine (ADDERALL XR) 20 MG 24 hr capsule, Take 1 capsule (20 mg total) by mouth every morning Max Daily Amount: 20 mg, Disp: 90 capsule, Rfl: 0    amphetamine-dextroamphetamine (ADDERALL, 10MG,) 10 mg tablet, Take 1 tablet (10 mg total) by mouth daily In afternoon as needed in addition to your XR 20 mg in AM Max Daily Amount: 10 mg, Disp: 30 tablet, Rfl: 0    Multiple Vitamin (MULTI-VITAMIN DAILY) TABS, Take by mouth, Disp: , Rfl:     sertraline (ZOLOFT) 50 mg tablet, Take 1 tablet (50 mg total) by mouth daily at bedtime (Patient taking differently: Take 100 mg by mouth daily at bedtime  ), Disp: 180 tablet, Rfl: 1    Cholecalciferol (VITAMIN D) 2000 units CAPS, Take by mouth daily (Patient not taking: Reported on 3/30/2022 ), Disp: , Rfl:     ALLERGIES:  No Known Allergies    REVIEW OF SYSTEMS:  Review of Systems   Constitutional: Negative for chills, fever and unexpected weight change  HENT: Negative for hearing loss, nosebleeds and sore throat  Eyes: Negative for pain, redness and visual disturbance  Respiratory: Negative for cough, shortness of breath and wheezing  Cardiovascular: Negative for chest pain, palpitations and leg swelling  Gastrointestinal: Negative for abdominal pain, nausea and vomiting     Endocrine: Negative for polydipsia and polyuria  Genitourinary: Negative for difficulty urinating and hematuria  Musculoskeletal: Negative for arthralgias, joint swelling and myalgias  Skin: Negative for rash and wound  Neurological: Negative for dizziness, numbness and headaches  Psychiatric/Behavioral: Negative for decreased concentration, dysphoric mood and suicidal ideas  The patient is not nervous/anxious  VITALS:  Vitals:    03/30/22 1651   BP: 121/67   Pulse: 78       LABS:  HgA1c:   Lab Results   Component Value Date    HGBA1C 5 0 03/23/2022     BMP:   Lab Results   Component Value Date    CALCIUM 11 1 (H) 01/27/2022    K 3 7 01/27/2022    CO2 30 01/27/2022     01/27/2022    BUN 16 01/27/2022    CREATININE 0 77 01/27/2022       _____________________________________________________  PHYSICAL EXAMINATION:  General: well developed and well nourished, alert, oriented times 3 and appears comfortable  Psychiatric: Normal  HEENT: Normocephalic, Atraumatic Trachea Midline, No torticollis  Pulmonary: No audible wheezing or respiratory distress   Cardiovascular: No pitting edema, 2+ radial pulse   Abdominal/GI: abdomen non tender, non distended   Skin: No masses, erythema, lacerations, fluctation, ulcerations  Neurovascular: Sensation Intact to the Median, Ulnar, Radial Nerve, Motor Intact to the Median, Ulnar, Radial Nerve and Pulses Intact  Musculoskeletal: Normal, except as noted in detailed exam and in HPI        MUSCULOSKELETAL EXAMINATION:    Right wrist:     No erythema, ecchymosis or edema  Normal wrist ROM, non painful   No pain with supination/pronation   No pain with ECU synergy test   ECU tender to palpation   Non tender to palpation over fracture site   Full composite fist     ___________________________________________________  STUDIES REVIEWED:  No new imaging to review          PROCEDURES PERFORMED:  Hand/upper extremity injection: R wrist  Universal Protocol:  Consent: Verbal consent obtained  Written consent not obtained    Risks and benefits: risks, benefits and alternatives were discussed  Consent given by: patient  Site marked: the operative site was marked  Required items: required blood products, implants, devices, and special equipment available  Patient identity confirmed: verbally with patient    Supporting Documentation  Indications: pain   Procedure Details  Condition:tendonitis Site: R wrist Finger location: ECU tendon      Preparation: Patient was prepped and draped in the usual sterile fashion  Needle size: 25 G  Ultrasound guidance: no  Medications administered: 1 mL lidocaine 1 %; 40 mg dexamethasone 100 mg/10 mL    Patient tolerance: patient tolerated the procedure well with no immediate complications  Dressing:  Sterile dressing applied            ___________________________________________________      Scribe Attestation    I,:  Elida Donovan am acting as a scribe while in the presence of the attending physician :       I,:  Parag Mckinnon MD personally performed the services described in this documentation    as scribed in my presence :

## 2022-04-19 ENCOUNTER — OFFICE VISIT (OUTPATIENT)
Dept: FAMILY MEDICINE CLINIC | Facility: CLINIC | Age: 62
End: 2022-04-19
Payer: COMMERCIAL

## 2022-04-19 VITALS
BODY MASS INDEX: 27.48 KG/M2 | RESPIRATION RATE: 16 BRPM | TEMPERATURE: 97.1 F | HEART RATE: 64 BPM | SYSTOLIC BLOOD PRESSURE: 110 MMHG | HEIGHT: 60 IN | OXYGEN SATURATION: 96 % | WEIGHT: 140 LBS | DIASTOLIC BLOOD PRESSURE: 70 MMHG

## 2022-04-19 DIAGNOSIS — E78.00 HYPERCHOLESTEROLEMIA: ICD-10-CM

## 2022-04-19 DIAGNOSIS — F41.8 MIXED ANXIETY AND DEPRESSIVE DISORDER: Primary | ICD-10-CM

## 2022-04-19 DIAGNOSIS — F90.9 ATTENTION DEFICIT HYPERACTIVITY DISORDER (ADHD), UNSPECIFIED ADHD TYPE: ICD-10-CM

## 2022-04-19 DIAGNOSIS — R17 ELEVATED BILIRUBIN: ICD-10-CM

## 2022-04-19 DIAGNOSIS — E03.9 HYPOTHYROIDISM, UNSPECIFIED TYPE: ICD-10-CM

## 2022-04-19 PROCEDURE — 99214 OFFICE O/P EST MOD 30 MIN: CPT | Performed by: FAMILY MEDICINE

## 2022-04-19 RX ORDER — DEXTROAMPHETAMINE SACCHARATE, AMPHETAMINE ASPARTATE MONOHYDRATE, DEXTROAMPHETAMINE SULFATE AND AMPHETAMINE SULFATE 5; 5; 5; 5 MG/1; MG/1; MG/1; MG/1
20 CAPSULE, EXTENDED RELEASE ORAL EVERY MORNING
Qty: 90 CAPSULE | Refills: 0 | Status: SHIPPED | OUTPATIENT
Start: 2022-04-19 | End: 2022-07-14 | Stop reason: SDUPTHER

## 2022-04-19 RX ORDER — DEXTROAMPHETAMINE SACCHARATE, AMPHETAMINE ASPARTATE, DEXTROAMPHETAMINE SULFATE AND AMPHETAMINE SULFATE 2.5; 2.5; 2.5; 2.5 MG/1; MG/1; MG/1; MG/1
10 TABLET ORAL DAILY
Qty: 30 TABLET | Refills: 0 | Status: SHIPPED | OUTPATIENT
Start: 2022-04-19 | End: 2022-07-14 | Stop reason: SDUPTHER

## 2022-04-19 NOTE — ASSESSMENT & PLAN NOTE
Advised pt to follow a low cholesterol diet and to exercise on a regular basis  Recheck in 3 months

## 2022-04-19 NOTE — PROGRESS NOTES
Assessment/Plan:         Problem List Items Addressed This Visit        Endocrine    Hypothyroidism     Recheck labs and t/c starting med  Relevant Orders    TSH, 3rd generation    T4, free       Other    Mixed anxiety and depressive disorder - Primary     Stress level low and mood has been ok  Continue zoloft 100 mg qd  Refill sent  Relevant Medications    amphetamine-dextroamphetamine (ADDERALL, 10MG,) 10 mg tablet    amphetamine-dextroamphetamine (ADDERALL XR) 20 MG 24 hr capsule    sertraline (ZOLOFT) 50 mg tablet    Hypercholesterolemia     Advised pt to follow a low cholesterol diet and to exercise on a regular basis  Recheck in 3 months  Relevant Orders    Lipid panel    Elevated bilirubin     Recheck LFTs  Relevant Orders    Hepatic function panel    ADHD     Concentration ok  Continue Adderall ER 20 mg qd and refill sent  Uses Adderall 10 mg as needed  Relevant Medications    amphetamine-dextroamphetamine (ADDERALL, 10MG,) 10 mg tablet    amphetamine-dextroamphetamine (ADDERALL XR) 20 MG 24 hr capsule    sertraline (ZOLOFT) 50 mg tablet            Subjective:      Patient ID: Krissy Simms is a 57 y o  female  Pt here for f/u ADD and HARDEEP  Pt doing ok  No cp/sob  No headaches  Pt takes adderall XR 20 mg qd and adderall 10 mg as needed  No GI upset or insomnia  No palpitations  Stress level has been ok on zoloft 100 mg qd  Needs refills  No pain or new c/o         The following portions of the patient's history were reviewed and updated as appropriate:   Past Medical History:  She has a past medical history of ADHD, Anxiety, Arthritis, Asthma, Breast cancer ([facility] [address] ) (01/01/2012), Cancer ([facility] [address] ) (08/2012), Closed fracture of radial styloid, CPAP (continuous positive airway pressure) dependence, Endometriosis, Family history of colon cancer in mother, Forceps delivery, GERD (gastroesophageal reflux disease), H/O mitral valve prolapse, Hiatal hernia, History of radiation therapy, Hyperlipidemia, Infertility, female, Normal delivery, Sleep apnea, and TB lung, latent  ,  _______________________________________________________________________  Medical Problems:  does not have any pertinent problems on file ,  _______________________________________________________________________  Past Surgical History:   has a past surgical history that includes Endometrial biopsy; Cholecystectomy (01/23/2013); Dilation and curettage of uterus; LAPAROSCOPY; Ursula-en-y procedure (01/23/2013); Breast biopsy (Right, 04/01/2012); Breast lumpectomy (Right, 08/01/2012); US guidance (5/14/2013); Colonoscopy; Upper gastrointestinal endoscopy; and Knee arthroscopy  ,  _______________________________________________________________________  Family History:  family history includes Cancer in her maternal grandfather and mother; Colon cancer (age of onset: 48) in her mother; Colon cancer (age of onset: [de-identified]) in her maternal grandfather; Hypertension in her family, father, and paternal grandmother; Mitral valve prolapse in her family; No Known Problems in her daughter, daughter, maternal grandmother, paternal aunt, paternal grandfather, and sister; Osteoporosis in her family and mother; Stroke in her father and paternal grandmother; Varicose Veins in her family  ,  _______________________________________________________________________  Social History:   reports that she has never smoked  She has never used smokeless tobacco  She reports current alcohol use  She reports that she does not use drugs  ,  _______________________________________________________________________  Allergies:  has No Known Allergies     _______________________________________________________________________  Current Outpatient Medications   Medication Sig Dispense Refill    amphetamine-dextroamphetamine (ADDERALL XR) 20 MG 24 hr capsule Take 1 capsule (20 mg total) by mouth every morning Max Daily Amount: 20 mg 90 capsule 0    amphetamine-dextroamphetamine (ADDERALL, 10MG,) 10 mg tablet Take 1 tablet (10 mg total) by mouth daily In afternoon as needed in addition to your XR 20 mg in AM Max Daily Amount: 10 mg 30 tablet 0    Multiple Vitamin (MULTI-VITAMIN DAILY) TABS Take by mouth      sertraline (ZOLOFT) 50 mg tablet Take 2 tablets (100 mg total) by mouth daily at bedtime 180 tablet 0    Cholecalciferol (VITAMIN D) 2000 units CAPS Take by mouth daily (Patient not taking: Reported on 3/30/2022 )       No current facility-administered medications for this visit      _______________________________________________________________________  Review of Systems   Constitutional: Negative for activity change, appetite change, fatigue and unexpected weight change  Respiratory: Negative for cough, chest tightness and shortness of breath  Cardiovascular: Negative for chest pain and palpitations  Gastrointestinal: Negative for abdominal pain, constipation, diarrhea, nausea and vomiting  Genitourinary: Negative for difficulty urinating  Musculoskeletal: Negative for arthralgias  Skin: Negative for rash  Neurological: Negative for dizziness and headaches  Psychiatric/Behavioral: Negative for decreased concentration, dysphoric mood, sleep disturbance and suicidal ideas  The patient is not nervous/anxious  Objective:  Vitals:    04/19/22 1648   BP: 110/70   BP Location: Left arm   Patient Position: Sitting   Cuff Size: Standard   Pulse: 64   Resp: 16   Temp: (!) 97 1 °F (36 2 °C)   TempSrc: Temporal   SpO2: 96%   Weight: 63 5 kg (140 lb)   Height: 5' (1 524 m)     Body mass index is 27 34 kg/m²  Physical Exam  Vitals and nursing note reviewed  Constitutional:       Appearance: Normal appearance  She is well-developed  HENT:      Head: Normocephalic and atraumatic  Cardiovascular:      Rate and Rhythm: Normal rate and regular rhythm  Heart sounds: Normal heart sounds  No murmur heard        Pulmonary: Effort: Pulmonary effort is normal  No respiratory distress  Breath sounds: Normal breath sounds  No wheezing  Musculoskeletal:      Cervical back: Normal range of motion and neck supple  Right lower leg: No edema  Left lower leg: No edema  Lymphadenopathy:      Cervical: No cervical adenopathy  Neurological:      Mental Status: She is alert and oriented to person, place, and time  Psychiatric:         Mood and Affect: Mood normal          Behavior: Behavior normal          Thought Content:  Thought content normal          Judgment: Judgment normal

## 2022-06-07 ENCOUNTER — APPOINTMENT (OUTPATIENT)
Dept: LAB | Age: 62
End: 2022-06-07
Payer: COMMERCIAL

## 2022-06-07 DIAGNOSIS — R17 ELEVATED BILIRUBIN: ICD-10-CM

## 2022-06-07 DIAGNOSIS — E78.00 HYPERCHOLESTEROLEMIA: ICD-10-CM

## 2022-06-07 DIAGNOSIS — E03.9 HYPOTHYROIDISM, UNSPECIFIED TYPE: ICD-10-CM

## 2022-06-07 LAB
ALBUMIN SERPL BCP-MCNC: 4 G/DL (ref 3.5–5)
ALP SERPL-CCNC: 84 U/L (ref 46–116)
ALT SERPL W P-5'-P-CCNC: 36 U/L (ref 12–78)
AST SERPL W P-5'-P-CCNC: 25 U/L (ref 5–45)
BILIRUB DIRECT SERPL-MCNC: 0.16 MG/DL (ref 0–0.2)
BILIRUB SERPL-MCNC: 0.74 MG/DL (ref 0.2–1)
CHOLEST SERPL-MCNC: 220 MG/DL
HDLC SERPL-MCNC: 74 MG/DL
LDLC SERPL CALC-MCNC: 135 MG/DL (ref 0–100)
NONHDLC SERPL-MCNC: 146 MG/DL
PROT SERPL-MCNC: 7.1 G/DL (ref 6.4–8.2)
T4 FREE SERPL-MCNC: 0.87 NG/DL (ref 0.76–1.46)
TRIGL SERPL-MCNC: 56 MG/DL
TSH SERPL DL<=0.05 MIU/L-ACNC: 4.16 UIU/ML (ref 0.45–4.5)

## 2022-06-07 PROCEDURE — 84439 ASSAY OF FREE THYROXINE: CPT

## 2022-06-07 PROCEDURE — 80061 LIPID PANEL: CPT

## 2022-06-07 PROCEDURE — 80076 HEPATIC FUNCTION PANEL: CPT

## 2022-06-07 PROCEDURE — 84443 ASSAY THYROID STIM HORMONE: CPT

## 2022-06-15 ENCOUNTER — TELEPHONE (OUTPATIENT)
Dept: OBGYN CLINIC | Facility: HOSPITAL | Age: 62
End: 2022-06-15

## 2022-06-15 NOTE — TELEPHONE ENCOUNTER
Patient sees Dr Shannon Perales  Patient is calling in wanting to make another appointment for her to be seen with the Dr relating her right wrist  Information forwarded over to SME/  to provide patient with next appointment

## 2022-06-23 ENCOUNTER — EVALUATION (OUTPATIENT)
Dept: OCCUPATIONAL THERAPY | Age: 62
End: 2022-06-23
Payer: COMMERCIAL

## 2022-06-23 DIAGNOSIS — M77.8 RIGHT WRIST TENDINITIS: ICD-10-CM

## 2022-06-23 PROCEDURE — 97140 MANUAL THERAPY 1/> REGIONS: CPT

## 2022-06-23 PROCEDURE — 97165 OT EVAL LOW COMPLEX 30 MIN: CPT

## 2022-06-23 NOTE — PROGRESS NOTES
OT Evaluation     Today's date: 2022  Patient name: Altagracia Bernard  : 1960  MRN: [id number]  Referring provider: Jocelynn Fam MD  Dx:   Encounter Diagnosis     ICD-10-CM    1  Right wrist tendinitis  M77 8 Ambulatory Referral to PT/OT Hand Therapy                  Assessment  Assessment details: Raul Berger presents with tightness along volar forearm with burning  Pain with palpation of FCU and medial epicondyle and a positive resisted flexion test  She would benefit from therapy 1-2x per week to decrease pain and increase strength  Treatment to include manuals, there ex, activity modification, and kinesiotaping  Impairments: abnormal or restricted ROM, activity intolerance, impaired physical strength, lacks appropriate home exercise program and pain with function    Goals  STG 1: Decrease overall pain to 5/10 in 4-6 weeks  STG 2: Increase overall strength by 25% in 4-6 weeks  STG 3: Compliant with HEP in 2 weeks  LTG 1: Complete all ADL/IADLs improved to prior level of function within 6-8 weeks  LTG 2: Leisure/social skills improved within 6-8 weeks  LTG 3: Work skills improved to prior level of function within 6-8 weeks    Patient Goal: To get better    Goals, plan of care and treatment condition discussed with patient  Patient expresses their understanding and questions regarding these isues were addressed  Plan  Patient would benefit from: custom splinting, skilled occupational therapy and OT eval  Planned modality interventions: thermotherapy: hydrocollator packs, thermotherapy: paraffin bath and fluidotherapy  Planned therapy interventions: joint mobilization, manual therapy, Shields taping, home exercise program, graded exercise, graded activity, functional ROM exercises, therapeutic activities, therapeutic exercise and neuromuscular re-education  Frequency: 1x week        Subjective Evaluation    History of Present Illness  Onset date: Since [facility]    Mechanism of injury: Raul Berger has had increased pain and discomfort of her wrist since a distal radius fracture last year  Dx of ECU tenonitis which as flared up again recently  Now she reports increased pain along her FCU and medial epicondyle   Pain  Current pain ratin  At best pain ratin  At worst pain ratin  Quality: burning, throbbing and dull ache    Social Support    Employment status: working ([employer])  Hand dominance: right          Objective     Active Range of Motion     Right Wrist   Wrist flexion: WFL  Wrist extension: WFl  Radial deviation: WFL  Ulnar deviation: WFL    Strength/Myotome Testing     Right Wrist/Hand   Wrist extension: 4     (2nd hand position)     Trial 1: 49 9    Trial 2: 57 1    Tests     Right Elbow   Negative Tinel's sign (cubital tunnel)       Additional Tests Details  (+) Resisted Flexion                Precautions: Universal      Manuals             IASTM 15'            KT FCU Overactive, Support through proximal FA                                      Neuro Re-Ed                                                                                                        Ther Ex                                                                                                        HEP: Jennifer's             Ther Activity                                       Gait Training                                       Modalities             P

## 2022-06-27 ENCOUNTER — OFFICE VISIT (OUTPATIENT)
Dept: OCCUPATIONAL THERAPY | Age: 62
End: 2022-06-27
Payer: COMMERCIAL

## 2022-06-27 DIAGNOSIS — M77.8 RIGHT WRIST TENDINITIS: Primary | ICD-10-CM

## 2022-06-27 PROCEDURE — 97140 MANUAL THERAPY 1/> REGIONS: CPT

## 2022-06-27 PROCEDURE — 97110 THERAPEUTIC EXERCISES: CPT

## 2022-06-27 NOTE — PROGRESS NOTES
Daily Note     Today's date: 2022  Patient name: Grey Guaman  : 1960  MRN: [id number]  Referring provider: Latoya Reina MD  Dx:   Encounter Diagnosis     ICD-10-CM    1  Right wrist tendinitis  M77 8                   Subjective: I do feel like it's a little looser      Objective: See treatment diary below      Assessment: Tolerated treatment well  Patient would benefit from continued OT      Plan: Continue per plan of care        Precautions: Universal      Manuals             IASTM 15' 25'           KT FCU Overactive, Support through proximal FA FCU Overactive Support through FA                                     Neuro Re-Ed                                                                                                        Ther Ex             Stretching  Extrinisc 8'                                                                                         HEP: Nirchl's             Ther Activity                                       Gait Training                                       Modalities             [facility]

## 2022-07-11 ENCOUNTER — OFFICE VISIT (OUTPATIENT)
Dept: OCCUPATIONAL THERAPY | Age: 62
End: 2022-07-11
Payer: COMMERCIAL

## 2022-07-11 DIAGNOSIS — M77.8 RIGHT WRIST TENDINITIS: Primary | ICD-10-CM

## 2022-07-11 PROCEDURE — 97140 MANUAL THERAPY 1/> REGIONS: CPT

## 2022-07-11 PROCEDURE — 97110 THERAPEUTIC EXERCISES: CPT

## 2022-07-11 NOTE — PROGRESS NOTES
Daily Note     Today's date: 2022  Patient name: Jovany Sagastume  : 1960  MRN: [id number]  Referring provider: Pk Robledo MD  Dx:   Encounter Diagnosis     ICD-10-CM    1  Right wrist tendinitis  M77 8                   Subjective: It is much better      Objective: See treatment diary below      Assessment: Tolerated treatment well  Patient would benefit from continued OT  Increased tightness along FCU noted, decreased post IASTM  Plan: Continue per plan of care        Precautions: Universal      Manuals            IASTM 15' 25' 25'          KT FCU Overactive, Support through proximal FA FCU Overactive Support through FA FCU Overactive Support through FA                                    Neuro Re-Ed                                                                                                        Ther Ex             Stretching  Extrinisc 8'                                                                                         HEP: Jennifer's             Ther Activity                                       Gait Training                                       Modalities             [facility]

## 2022-07-13 ENCOUNTER — OFFICE VISIT (OUTPATIENT)
Dept: OBGYN CLINIC | Facility: CLINIC | Age: 62
End: 2022-07-13
Payer: COMMERCIAL

## 2022-07-13 VITALS
BODY MASS INDEX: 27.52 KG/M2 | RESPIRATION RATE: 16 BRPM | DIASTOLIC BLOOD PRESSURE: 67 MMHG | HEIGHT: 60 IN | HEART RATE: 67 BPM | SYSTOLIC BLOOD PRESSURE: 102 MMHG | WEIGHT: 140.2 LBS

## 2022-07-13 DIAGNOSIS — M77.8 RIGHT WRIST TENDINITIS: Primary | ICD-10-CM

## 2022-07-13 PROCEDURE — 99213 OFFICE O/P EST LOW 20 MIN: CPT | Performed by: SURGERY

## 2022-07-13 NOTE — PROGRESS NOTES
ASSESSMENT/PLAN:      60 y o  female s/p distal radius fracture treated non operatively, DOI 2/18/21 and right wrist ECU tendinitis  Overall Briana Castro is doing well  Activities to tolerance, no restrictions  In the winter she may begin to experience  increased pain, at which time a radiocarpal joint CSI may be performed at that time if warranted  Follow up in the office as needed if symptoms worsen or fail to improve  The patient verbalized understanding of exam findings and treatment plan  We engaged in the shared decision-making process and treatment options were discussed at length with the patient  Surgical and conservative management discussed today along with risks and benefits  Diagnoses and all orders for this visit:    Right wrist tendinitis      Follow Up:  Return if symptoms worsen or fail to improve  To Do Next Visit:  Re-evaluation of current issue    ____________________________________________________________________________________________________________________________________________      CHIEF COMPLAINT:  Chief Complaint   Patient presents with    Right Wrist - Pain, Follow-up       SUBJECTIVE:  Miky Engle is a 60y o  year old RHD female who presents to the office today for a follow up regarding right wrist pain  She was last seen in the office on 3/30/22  At that time she underwent a right wrist ECU CSI  She has been attending therapy  She notes ulnar sided wrist pain has improved aprox  50 percent  She feels pain is intermittent and random in nature  She was able to get a more ergonomic computer/desk at work  She did not look into a vertical mouse yet  She is not currently taking anything for pain control  I have personally reviewed all the relevant PMH, PSH, SH, FH, Medications and allergies       PAST MEDICAL HISTORY:  Past Medical History:   Diagnosis Date    ADHD     Anxiety     Arthritis     Asthma     [facility] ) 01/01/2012    Cancer ([facility] ) 08/2012 DCIS-right breast    Closed fracture of radial styloid     CPAP (continuous positive airway pressure) dependence     Endometriosis     Family history of colon cancer in mother    [facility] [employer] delivery     1991 daughter    GERD (gastroesophageal reflux disease)     H/O mitral valve prolapse     no regurgitation    Hiatal hernia     History of radiation therapy     Hyperlipidemia     Infertility, female     Normal delivery     1998 daughter    Sleep apnea     on cpap    TB lung, latent     treated with INH (in her 29's)       PAST SURGICAL HISTORY:  Past Surgical History:   Procedure Laterality Date    BREAST BIOPSY Right 04/01/2012    biopsy breast percutaneous needle core    BREAST LUMPECTOMY Right 08/01/2012    CHOLECYSTECTOMY  01/23/2013    COLONOSCOPY      DILATION AND CURETTAGE OF UTERUS      ENDOMETRIAL BIOPSY      without cervical dilation    KNEE ARTHROSCOPY      Plica syndrome    LAPAROSCOPY      Exploratory 1990 & 1994    SALUD-EN-Y PROCEDURE  01/23/2013    Dr Camryn Zambrano  5/14/2013       FAMILY HISTORY:  Family History   Problem Relation Age of Onset    Colon cancer Mother 48    Cancer Mother         colon    Osteoporosis Mother     Stroke Father     Hypertension Father     No Known Problems Maternal Grandmother     Hypertension Family     Mitral valve prolapse Family     Osteoporosis Family     Varicose Veins Family     No Known Problems Sister     No Known Problems Daughter     Hypertension Paternal Grandmother     Stroke Paternal Grandmother     No Known Problems Daughter     No Known Problems Paternal Aunt     Colon cancer Maternal Grandfather [de-identified]    Cancer Maternal Grandfather         colon    No Known Problems Paternal Grandfather        SOCIAL HISTORY:  Social History     Tobacco Use    Smoking status: Never Smoker    Smokeless tobacco: Never Used   Vaping Use    Vaping Use: Never used   Substance Use Topics    Alcohol use: Yes     Alcohol/week: 0 0 standard drinks     Comment: rarely    Drug use: Never       MEDICATIONS:    Current Outpatient Medications:     amphetamine-dextroamphetamine (ADDERALL XR) 20 MG 24 hr capsule, Take 1 capsule (20 mg total) by mouth every morning Max Daily Amount: 20 mg, Disp: 90 capsule, Rfl: 0    amphetamine-dextroamphetamine (ADDERALL, 10MG,) 10 mg tablet, Take 1 tablet (10 mg total) by mouth daily In afternoon as needed in addition to your XR 20 mg in AM Max Daily Amount: 10 mg, Disp: 30 tablet, Rfl: 0    sertraline (ZOLOFT) 50 mg tablet, Take 2 tablets (100 mg total) by mouth daily at bedtime, Disp: 180 tablet, Rfl: 0    Cholecalciferol (VITAMIN D) 2000 units CAPS, Take by mouth daily (Patient not taking: No sig reported), Disp: , Rfl:     Multiple Vitamin (MULTI-VITAMIN DAILY) TABS, Take by mouth (Patient not taking: Reported on 7/13/2022), Disp: , Rfl:     ALLERGIES:  No Known Allergies    REVIEW OF SYSTEMS:  Review of Systems   Constitutional: Negative for chills, fever and unexpected weight change  HENT: Negative for hearing loss, nosebleeds and sore throat  Eyes: Negative for pain, redness and visual disturbance  Respiratory: Negative for cough, shortness of breath and wheezing  Cardiovascular: Negative for chest pain, palpitations and leg swelling  Gastrointestinal: Negative for abdominal pain, nausea and vomiting  Endocrine: Negative for polydipsia and polyuria  Genitourinary: Negative for difficulty urinating and hematuria  Musculoskeletal: Negative for arthralgias, joint swelling and myalgias  Skin: Negative for rash and wound  Neurological: Negative for dizziness, numbness and headaches  Psychiatric/Behavioral: Negative for decreased concentration, dysphoric mood and suicidal ideas  The patient is not nervous/anxious          VITALS:  Vitals:    07/13/22 1538   BP: 102/67   Pulse: 67   Resp: 16       LABS:  HgA1c:   Lab Results   Component Value Date HGBA1C 5 0 03/23/2022     BMP:   Lab Results   Component Value Date    CALCIUM 11 1 (H) 01/27/2022    K 3 7 01/27/2022    CO2 30 01/27/2022     01/27/2022    BUN 16 01/27/2022    CREATININE 0 77 01/27/2022       _____________________________________________________  PHYSICAL EXAMINATION:  General: well developed and well nourished, alert, oriented times 3 and appears comfortable  Psychiatric: Normal  HEENT: Normocephalic, Atraumatic Trachea Midline, No torticollis  Pulmonary: No audible wheezing or respiratory distress   Cardiovascular: No pitting edema, 2+ radial pulse   Abdominal/GI: abdomen non tender, non distended   Skin: No masses, erythema, lacerations, fluctation, ulcerations  Neurovascular: Sensation Intact to the Median, Ulnar, Radial Nerve, Motor Intact to the Median, Ulnar, Radial Nerve and Pulses Intact  Musculoskeletal: Normal, except as noted in detailed exam and in HPI        MUSCULOSKELETAL EXAMINATION:    Right wrist:     No erythema, ecchymosis or edema  ECU is non tender to palpation today   No radiocarpal joint tenderness  Non tender to palpation over healed fracture site    No TFCC tenderness   Normal wrist ROM  Full composite fist     ___________________________________________________  STUDIES REVIEWED:  No new imaging to review           PROCEDURES PERFORMED:  Procedures  No Procedures performed today    _____________________________________________________      Leah El    I,:  Chapito Donovan am acting as a scribe while in the presence of the attending physician :       I,:  Yasmeen Nagel MD personally performed the services described in this documentation    as scribed in my presence :

## 2022-07-14 ENCOUNTER — OFFICE VISIT (OUTPATIENT)
Dept: FAMILY MEDICINE CLINIC | Facility: CLINIC | Age: 62
End: 2022-07-14
Payer: COMMERCIAL

## 2022-07-14 VITALS
DIASTOLIC BLOOD PRESSURE: 64 MMHG | OXYGEN SATURATION: 98 % | HEIGHT: 60 IN | SYSTOLIC BLOOD PRESSURE: 118 MMHG | WEIGHT: 136 LBS | RESPIRATION RATE: 16 BRPM | HEART RATE: 82 BPM | TEMPERATURE: 97.2 F | BODY MASS INDEX: 26.7 KG/M2

## 2022-07-14 DIAGNOSIS — Z00.01 ENCOUNTER FOR GENERAL ADULT MEDICAL EXAMINATION WITH ABNORMAL FINDINGS: Primary | ICD-10-CM

## 2022-07-14 DIAGNOSIS — M81.0 OSTEOPOROSIS WITHOUT CURRENT PATHOLOGICAL FRACTURE, UNSPECIFIED OSTEOPOROSIS TYPE: ICD-10-CM

## 2022-07-14 DIAGNOSIS — Z23 ENCOUNTER FOR IMMUNIZATION: ICD-10-CM

## 2022-07-14 DIAGNOSIS — F90.9 ATTENTION DEFICIT HYPERACTIVITY DISORDER (ADHD), UNSPECIFIED ADHD TYPE: ICD-10-CM

## 2022-07-14 DIAGNOSIS — E83.52 HYPERCALCEMIA: ICD-10-CM

## 2022-07-14 PROCEDURE — 90750 HZV VACC RECOMBINANT IM: CPT | Performed by: FAMILY MEDICINE

## 2022-07-14 PROCEDURE — 99396 PREV VISIT EST AGE 40-64: CPT | Performed by: FAMILY MEDICINE

## 2022-07-14 PROCEDURE — 90471 IMMUNIZATION ADMIN: CPT | Performed by: FAMILY MEDICINE

## 2022-07-14 RX ORDER — DEXTROAMPHETAMINE SACCHARATE, AMPHETAMINE ASPARTATE MONOHYDRATE, DEXTROAMPHETAMINE SULFATE AND AMPHETAMINE SULFATE 5; 5; 5; 5 MG/1; MG/1; MG/1; MG/1
20 CAPSULE, EXTENDED RELEASE ORAL EVERY MORNING
Qty: 90 CAPSULE | Refills: 0 | Status: SHIPPED | OUTPATIENT
Start: 2022-07-14

## 2022-07-14 RX ORDER — DEXTROAMPHETAMINE SACCHARATE, AMPHETAMINE ASPARTATE, DEXTROAMPHETAMINE SULFATE AND AMPHETAMINE SULFATE 2.5; 2.5; 2.5; 2.5 MG/1; MG/1; MG/1; MG/1
10 TABLET ORAL DAILY
Qty: 60 TABLET | Refills: 0 | Status: SHIPPED | OUTPATIENT
Start: 2022-07-14

## 2022-07-14 NOTE — PROGRESS NOTES
[address] [facility]    NAME: Harish Arriola  AGE: 60 y o  SEX: female  : 1960     DATE: 2022     Assessment and Plan:     Problem List Items Addressed This Visit        Musculoskeletal and Integument    Osteoporosis without current pathological fracture    Relevant Orders    Ambulatory Referral to Rheumatology       Other    ADHD    Relevant Medications    amphetamine-dextroamphetamine (ADDERALL, 10MG,) 10 mg tablet    amphetamine-dextroamphetamine (ADDERALL XR) 20 MG 24 hr capsule      Other Visit Diagnoses     Encounter for immunization    -  Primary    Relevant Orders    Zoster Vaccine Recombinant IM (Completed)    Hypercalcemia        Relevant Orders    Ambulatory Referral to Rheumatology    PTH, intact    Vitamin D 25 hydroxy    Comprehensive metabolic panel    Calcium, ionized    Annual physical exam            Has not been taking calcium , vit d, previous dexa scan done by Gyn Darin 2021-showed osteoporosis which patient states she saw in [place] but was never addressed  I did discuss several treatment option and rheumatology referral done  Recheck labs due to elevated calcium, advised to resume calcium and vit d  Shingrix vaccine today and booster in 8 weeks to 6 months  Patient has been using additional 10 mg of Adderral 10 mg daily , continue 20 mg XR with drug holiday when off and on vacation and 10 mg daily BID prn   BP is controlled  Immunizations and preventive care screenings were discussed with patient today  Appropriate education was printed on patient's after visit summary  Counseling:  Alcohol/drug use: discussed moderation in alcohol intake, the recommendations for healthy alcohol use, and avoidance of illicit drug use  Dental Health: discussed importance of regular tooth brushing, flossing, and dental visits    Injury prevention: discussed safety/seat belts, safety helmets, smoke detectors, carbon dioxide detectors, and smoking near bedding or upholstery  · Exercise: the importance of regular exercise/physical activity was discussed  Recommend exercise 3-5 times per week for at least 30 minutes  No follow-ups on file  Chief Complaint:     Chief Complaint   Patient presents with    Physical Exam      History of Present Illness:     Adult Annual Physical   Patient here for a comprehensive physical exam  The patient reports no problems  Diet and Physical Activity  · Diet/Nutrition: well balanced diet, limited junk food and consuming 3-5 servings of fruits/vegetables daily  · Exercise: moderate cardiovascular exercise  Depression Screening  PHQ-2/9 Depression Screening    Little interest or pleasure in doing things: 0 - not at all  Feeling down, depressed, or hopeless: 0 - not at all  Trouble falling or staying asleep, or sleeping too much: 0 - not at all  Feeling tired or having little energy: 0 - not at all  Poor appetite or overeatin - not at all  Feeling bad about yourself - or that you are a failure or have let yourself or your family down: 0 - not at all  Trouble concentrating on things, such as reading the newspaper or watching television: 0 - not at all  Moving or speaking so slowly that other people could have noticed  Or the opposite - being so fidgety or restless that you have been moving around a lot more than usual: 0 - not at all  Thoughts that you would be better off dead, or of hurting yourself in some way: 0 - not at all  PHQ-9 Score: 0   PHQ-9 Interpretation: No or Minimal depression        General Health  · Sleep: sleeps well  · Hearing: grossly normal   · Vision: goes for regular eye exams  · Dental: regular dental visits  /GYN Health  · Patient is: postmenopausal  ·   ·      Review of Systems:     Review of Systems   Constitutional: Negative for fatigue and fever     HENT: Negative for congestion, facial swelling, mouth sores, rhinorrhea, sore throat and trouble swallowing  Eyes: Negative for pain and redness  Respiratory: Negative for cough, shortness of breath and wheezing  Cardiovascular: Negative for chest pain, palpitations and leg swelling  Gastrointestinal: Negative for abdominal pain, blood in stool, constipation, diarrhea and nausea  Genitourinary: Negative for dysuria, hematuria and urgency  Musculoskeletal: Negative for arthralgias, back pain and myalgias  Skin: Negative for rash and wound  Neurological: Negative for seizures, syncope and headaches  Hematological: Negative for adenopathy  Psychiatric/Behavioral: Negative for agitation and behavioral problems        Past Medical History:     Past Medical History:   Diagnosis Date    ADHD     Anxiety     Arthritis     Asthma     Breast cancer ([facility] ) 01/01/2012    Cancer ([facility] ) 08/2012    DCIS-right breast    Closed fracture of radial styloid     CPAP (continuous positive airway pressure) dependence     Endometriosis     Family history of colon cancer in mother    [facility] delivery     1991 daughter    GERD (gastroesophageal reflux disease)     H/O mitral valve prolapse     no regurgitation    Hiatal hernia     History of radiation therapy     Hyperlipidemia     Infertility, female     Normal delivery     1998 daughter    Sleep apnea     on cpap    TB lung, latent     treated with INH (in her 29's)      Past Surgical History:     Past Surgical History:   Procedure Laterality Date    BREAST BIOPSY Right 04/01/2012    biopsy breast percutaneous needle core    BREAST LUMPECTOMY Right 08/01/2012    CHOLECYSTECTOMY  01/23/2013    COLONOSCOPY      DILATION AND CURETTAGE OF UTERUS      ENDOMETRIAL BIOPSY      without cervical dilation    KNEE ARTHROSCOPY      Plica syndrome    LAPAROSCOPY      Exploratory 1990 & 1994    SALUD-EN-Y PROCEDURE  01/23/2013    Dr Makenna Pelayo  5/14/2013      Social History: Social History     Socioeconomic History    Marital status:      Spouse name: None    Number of children: 2    Years of education: None    Highest education level: None   Occupational History    None   Tobacco Use    Smoking status: Never Smoker    Smokeless tobacco: Never Used   Vaping Use    Vaping Use: Never used   Substance and Sexual Activity    Alcohol use:  Yes     Alcohol/week: 0 0 standard drinks     Comment: rarely    Drug use: Never    Sexual activity: Yes     Partners: Male     Birth control/protection: Post-menopausal   Other Topics Concern    None   Social History Narrative        2 children    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, ceramics     Social Determinants of Health     Financial Resource Strain: Not on file   Food Insecurity: Not on file   Transportation Needs: Not on file   Physical Activity: Not on file   Stress: Not on file   Social Connections: Not on file   Intimate Partner Violence: Not on file   Housing Stability: Not on file      Family History:     Family History   Problem Relation Age of Onset    Colon cancer Mother 48    Cancer Mother         colon    Osteoporosis Mother     Stroke Father     Hypertension Father     No Known Problems Maternal Grandmother     Hypertension Family     Mitral valve prolapse Family     Osteoporosis Family     Varicose Veins Family     No Known Problems Sister     No Known Problems Daughter     Hypertension Paternal Grandmother     Stroke Paternal Grandmother     No Known Problems Daughter     No Known Problems Paternal Aunt     Colon cancer Maternal Grandfather [de-identified]    Cancer Maternal Grandfather         colon    No Known Problems Paternal Grandfather       Current Medications:     Current Outpatient Medications   Medication Sig Dispense Refill    amphetamine-dextroamphetamine (ADDERALL XR) 20 MG 24 hr capsule Take 1 capsule (20 mg total) by mouth every morning Max Daily Amount: 20 mg 90 capsule 0    amphetamine-dextroamphetamine (ADDERALL, 10MG,) 10 mg tablet Take 1 tablet (10 mg total) by mouth daily In afternoon as needed in addition to your XR 20 mg in AM Max Daily Amount: 10 mg 60 tablet 0    sertraline (ZOLOFT) 50 mg tablet Take 2 tablets (100 mg total) by mouth daily at bedtime 180 tablet 0    Cholecalciferol (VITAMIN D) 2000 units CAPS Take by mouth daily (Patient not taking: No sig reported)      Multiple Vitamin (MULTI-VITAMIN DAILY) TABS Take by mouth (Patient not taking: Reported on 7/13/2022)       No current facility-administered medications for this visit  Allergies:     No Known Allergies   Physical Exam:     /64 (BP Location: Left arm, Patient Position: Sitting, Cuff Size: Adult)   Pulse 82   Temp (!) 97 2 °F (36 2 °C) (Temporal)   Resp 16   Ht 5' (1 524 m)   Wt 61 7 kg (136 lb)   SpO2 98%   BMI 26 56 kg/m²     Physical Exam  Vitals and nursing note reviewed  Constitutional:       Appearance: Normal appearance  She is well-developed  HENT:      Head: Normocephalic and atraumatic  Right Ear: Tympanic membrane, ear canal and external ear normal       Left Ear: Tympanic membrane, ear canal and external ear normal       Nose: Nose normal       Mouth/Throat:      Pharynx: No oropharyngeal exudate  Eyes:      General: No scleral icterus  Right eye: No discharge  Left eye: No discharge  Conjunctiva/sclera: Conjunctivae normal       Pupils: Pupils are equal, round, and reactive to light  Neck:      Thyroid: No thyromegaly  Cardiovascular:      Rate and Rhythm: Normal rate and regular rhythm  Heart sounds: No murmur heard  No gallop  Pulmonary:      Effort: Pulmonary effort is normal  No respiratory distress  Breath sounds: Normal breath sounds  No wheezing or rales  Abdominal:      Palpations: Abdomen is soft  Tenderness: There is no abdominal tenderness     Musculoskeletal:         General: No tenderness or deformity  Cervical back: Normal range of motion  Right lower leg: No edema  Left lower leg: No edema  Lymphadenopathy:      Cervical: No cervical adenopathy  Skin:     General: Skin is warm  Capillary Refill: Capillary refill takes less than 2 seconds  Findings: No erythema or rash  Neurological:      Mental Status: She is alert and oriented to person, place, and time  Deep Tendon Reflexes: Reflexes normal    Psychiatric:         Behavior: Behavior normal          Thought Content:  Thought content normal          Judgment: Judgment normal           Belinda Castorena MD  [address] [place] [address]

## 2022-07-14 NOTE — PATIENT INSTRUCTIONS

## 2022-07-21 ENCOUNTER — APPOINTMENT (OUTPATIENT)
Dept: LAB | Age: 62
End: 2022-07-21
Payer: COMMERCIAL

## 2022-07-21 ENCOUNTER — OFFICE VISIT (OUTPATIENT)
Dept: OCCUPATIONAL THERAPY | Age: 62
End: 2022-07-21
Payer: COMMERCIAL

## 2022-07-21 DIAGNOSIS — E21.3 HYPERPARATHYROIDISM (HCC): Primary | ICD-10-CM

## 2022-07-21 DIAGNOSIS — M77.8 RIGHT WRIST TENDINITIS: Primary | ICD-10-CM

## 2022-07-21 DIAGNOSIS — E83.52 HYPERCALCEMIA: ICD-10-CM

## 2022-07-21 LAB
25(OH)D3 SERPL-MCNC: 28.2 NG/ML (ref 30–100)
ALBUMIN SERPL BCP-MCNC: 3.8 G/DL (ref 3.5–5)
ALP SERPL-CCNC: 89 U/L (ref 46–116)
ALT SERPL W P-5'-P-CCNC: 25 U/L (ref 12–78)
ANION GAP SERPL CALCULATED.3IONS-SCNC: 6 MMOL/L (ref 4–13)
AST SERPL W P-5'-P-CCNC: 19 U/L (ref 5–45)
BILIRUB SERPL-MCNC: 0.59 MG/DL (ref 0.2–1)
BUN SERPL-MCNC: 24 MG/DL (ref 5–25)
CA-I BLD-SCNC: 1.2 MMOL/L (ref 1.12–1.32)
CALCIUM SERPL-MCNC: 9.1 MG/DL (ref 8.3–10.1)
CHLORIDE SERPL-SCNC: 105 MMOL/L (ref 96–108)
CO2 SERPL-SCNC: 28 MMOL/L (ref 21–32)
CREAT SERPL-MCNC: 0.84 MG/DL (ref 0.6–1.3)
GFR SERPL CREATININE-BSD FRML MDRD: 74 ML/MIN/1.73SQ M
GLUCOSE P FAST SERPL-MCNC: 91 MG/DL (ref 65–99)
POTASSIUM SERPL-SCNC: 3.9 MMOL/L (ref 3.5–5.3)
PROT SERPL-MCNC: 7.5 G/DL (ref 6.4–8.4)
PTH-INTACT SERPL-MCNC: 95.3 PG/ML (ref 18.4–80.1)
SODIUM SERPL-SCNC: 139 MMOL/L (ref 135–147)

## 2022-07-21 PROCEDURE — 36415 COLL VENOUS BLD VENIPUNCTURE: CPT

## 2022-07-21 PROCEDURE — 83970 ASSAY OF PARATHORMONE: CPT

## 2022-07-21 PROCEDURE — 80053 COMPREHEN METABOLIC PANEL: CPT

## 2022-07-21 PROCEDURE — 97140 MANUAL THERAPY 1/> REGIONS: CPT

## 2022-07-21 PROCEDURE — 82330 ASSAY OF CALCIUM: CPT

## 2022-07-21 PROCEDURE — 82306 VITAMIN D 25 HYDROXY: CPT

## 2022-07-21 NOTE — PROGRESS NOTES
Daily Note     Today's date: 2022  Patient name: Onofre Bean  : 1960  MRN: [id number]  Referring provider: Ana Lilia Hines MD  Dx:   Encounter Diagnosis     ICD-10-CM    1  Right wrist tendinitis  M77 8                   Subjective: It is so much better      Objective: See treatment diary below      Assessment: Tolerated treatment well  Patient would benefit from continued OT Decreased crepitus noted today since last visit, continue as tolerated  Plan: Continue per plan of care        Precautions: Universal      Manuals           IASTM 15' 25' 25' 25'         KT FCU Overactive, Support through proximal FA FCU Overactive Support through FA FCU Overactive Support through FA FCU overactive, support through FA                                   Neuro Re-Ed                                                                                                        Ther Ex             Stretching  Extrinisc 8'                                                                                         HEP: [facility]             Ther Activity                                       Gait Training                                       Modalities             [facility] [[email or web address]]

## 2022-07-23 ENCOUNTER — PATIENT MESSAGE (OUTPATIENT)
Dept: FAMILY MEDICINE CLINIC | Facility: CLINIC | Age: 62
End: 2022-07-23

## 2022-07-23 DIAGNOSIS — F41.8 MIXED ANXIETY AND DEPRESSIVE DISORDER: ICD-10-CM

## 2022-08-04 ENCOUNTER — OFFICE VISIT (OUTPATIENT)
Dept: OCCUPATIONAL THERAPY | Age: 62
End: 2022-08-04
Payer: COMMERCIAL

## 2022-08-04 DIAGNOSIS — M77.8 RIGHT WRIST TENDINITIS: Primary | ICD-10-CM

## 2022-08-04 DIAGNOSIS — E55.9 VITAMIN D DEFICIENCY: Primary | ICD-10-CM

## 2022-08-04 DIAGNOSIS — E21.3 HYPERPARATHYROIDISM (HCC): ICD-10-CM

## 2022-08-04 PROCEDURE — 97140 MANUAL THERAPY 1/> REGIONS: CPT

## 2022-08-04 RX ORDER — ERGOCALCIFEROL 1.25 MG/1
50000 CAPSULE ORAL WEEKLY
Qty: 12 CAPSULE | Refills: 0 | Status: SHIPPED | OUTPATIENT
Start: 2022-08-04 | End: 2022-10-14 | Stop reason: ALTCHOICE

## 2022-08-04 NOTE — PROGRESS NOTES
Daily Note     Today's date: 2022  Patient name: Bharati Sutton  : 1960  MRN: [id number]  Referring provider: Yesica Arredondo MD  Dx:   Encounter Diagnosis     ICD-10-CM    1  Right wrist tendinitis  M77 8                   Subjective: Offers no new compalints      Objective: See treatment diary below      Assessment: Tolerated treatment well  Patient would benefit from continued OT Decreased crepitus noted today since last visit, continue as tolerated  Plan: Continue per plan of care        Precautions: Universal      Manuals          IASTM 15' 25' 25' 25' 25'        KT FCU Overactive, Support through proximal FA FCU Overactive Support through FA FCU Overactive Support through FA FCU overactive, support through FA FCU overactive support through FA                                  Neuro Re-Ed                                                                                                        Ther Ex             Stretching  Extrinisc 8'                                                                                         HEP: Jennifer's             Ther Activity                                       Gait Training                                       Modalities             P

## 2022-08-08 ENCOUNTER — HOSPITAL ENCOUNTER (OUTPATIENT)
Dept: RADIOLOGY | Age: 62
Discharge: HOME | End: 2022-08-08
Payer: COMMERCIAL

## 2022-08-08 DIAGNOSIS — E21.3 HYPERPARATHYROIDISM (HCC): ICD-10-CM

## 2022-08-08 PROCEDURE — 76536 US EXAM OF HEAD AND NECK: CPT

## 2022-08-15 DIAGNOSIS — D35.1 PARATHYROID ADENOMA: Primary | ICD-10-CM

## 2022-08-18 ENCOUNTER — HOSPITAL ENCOUNTER (OUTPATIENT)
Dept: RADIOLOGY | Age: 62
Discharge: HOME | End: 2022-08-18
Payer: COMMERCIAL

## 2022-08-18 DIAGNOSIS — D35.1 PARATHYROID ADENOMA: ICD-10-CM

## 2022-08-18 PROCEDURE — G1004 CDSM NDSC: HCPCS

## 2022-08-18 PROCEDURE — 70492 CT SFT TSUE NCK W/O & W/DYE: CPT

## 2022-08-19 ENCOUNTER — TELEPHONE (OUTPATIENT)
Dept: PAIN MEDICINE | Facility: CLINIC | Age: 62
End: 2022-08-19

## 2022-08-19 NOTE — TELEPHONE ENCOUNTER
Left message for patient to call and r/s appt  With Dr Josh Olivier as she is leaving the network  Please schedule with another Provider

## 2022-09-25 DIAGNOSIS — J02.9 SORE THROAT: Primary | ICD-10-CM

## 2022-09-25 RX ORDER — AMOXICILLIN AND CLAVULANATE POTASSIUM 875; 125 MG/1; MG/1
1 TABLET, FILM COATED ORAL EVERY 12 HOURS SCHEDULED
Qty: 14 TABLET | Refills: 0 | Status: SHIPPED | OUTPATIENT
Start: 2022-09-25 | End: 2022-09-25 | Stop reason: SDUPTHER

## 2022-09-25 RX ORDER — AMOXICILLIN AND CLAVULANATE POTASSIUM 875; 125 MG/1; MG/1
1 TABLET, FILM COATED ORAL EVERY 12 HOURS SCHEDULED
Qty: 14 TABLET | Refills: 0 | Status: SHIPPED | OUTPATIENT
Start: 2022-09-25 | End: 2022-10-02

## 2022-09-29 ENCOUNTER — OFFICE VISIT (OUTPATIENT)
Dept: OBGYN CLINIC | Facility: CLINIC | Age: 62
End: 2022-09-29
Payer: COMMERCIAL

## 2022-09-29 VITALS
WEIGHT: 138.6 LBS | SYSTOLIC BLOOD PRESSURE: 122 MMHG | HEIGHT: 60 IN | BODY MASS INDEX: 27.21 KG/M2 | DIASTOLIC BLOOD PRESSURE: 68 MMHG

## 2022-09-29 DIAGNOSIS — Z01.419 ENCOUNTER FOR GYNECOLOGICAL EXAMINATION (GENERAL) (ROUTINE) WITHOUT ABNORMAL FINDINGS: Primary | ICD-10-CM

## 2022-09-29 PROCEDURE — 99396 PREV VISIT EST AGE 40-64: CPT | Performed by: OBSTETRICS & GYNECOLOGY

## 2022-09-29 NOTE — PROGRESS NOTES
ASSESSMENT & PLAN: Miky Engle is a 60 y o   with normal gynecologic exam     1   Routine well woman exam done today  2    Pap and HPV:Pap with HPV was not done today  Current ASCCP Guidelines reviewed  Last Pap  [unfilled] :  no abnormalities  3  Mammogram ordered  Recommend yearly mammography  4   Post menopausal- discussed what to expect, and when to call  Questions answered  5  The patient is sexually active  6  The following were reviewed in today's visit: breast self exam   7  Patient to return to office in 12 months for WWE  8   Hx of DCIS- following with Dr Estela Crook  All questions have been answered to her satisfaction  CC:  Annual Gynecologic Examination    HPI: Miky Engle is a 60 y o  Cam Robison who presents for annual gynecologic examination  She has the following concerns:  None  Denies any post menopausal bleeding, vaginal discharge, pelvic pain or dyspareunia  Hx of DCIS treated with lumpectomy x 2 and radiation  Health Maintenance:    She wears her seatbelt routinely  She does perform regular monthly self breast exams  She feels safe at home       Past Medical History:   Diagnosis Date    ADHD     Anxiety     Arthritis     Asthma     Breast cancer ([facility] [address] ) 2012    Cancer ([facility] [address] ) 2012    DCIS-right breast    Closed fracture of radial styloid     CPAP (continuous positive airway pressure) dependence     Endometriosis     Family history of colon cancer in mother    [employer] delivery      daughter    GERD (gastroesophageal reflux disease)     H/O mitral valve prolapse     no regurgitation    Hiatal hernia     History of radiation therapy     Hyperlipidemia     Infertility, female     Normal delivery      daughter    Sleep apnea     on cpap    TB lung, latent     treated with INH (in her 29's)       Past Surgical History:   Procedure Laterality Date    BREAST BIOPSY Right 2012    biopsy breast percutaneous needle core    BREAST LUMPECTOMY Right 2012    CHOLECYSTECTOMY  2013    COLONOSCOPY      DILATION AND CURETTAGE OF UTERUS      ENDOMETRIAL BIOPSY      without cervical dilation    KNEE ARTHROSCOPY      Plica syndrome    LAPAROSCOPY      Exploratory  &     SALUD-EN-Y PROCEDURE  2013    Dr Iron Lowe  2013       Past OB/Gyn History:   No LMP recorded  Patient is postmenopausal   Menstrual History:  OB History        2    Para   0    Term   0            AB        Living           SAB        IAB        Ectopic        Multiple        Live Births   2           Obstetric Comments   History of Infertility            No LMP recorded  Patient is postmenopausal        History of sexually transmitted infection No  Patient is currently sexually active  heterosexual Birth control: none  Last Pap  [unfilled] :  no abnormalities      Family History   Problem Relation Age of Onset    Colon cancer Mother 48    Cancer Mother         colon    Osteoporosis Mother     Stroke Father     Hypertension Father     No Known Problems Maternal Grandmother     Hypertension Family     Mitral valve prolapse Family     Osteoporosis Family     Varicose Veins Family     No Known Problems Sister     No Known Problems Daughter     Hypertension Paternal Grandmother     Stroke Paternal Grandmother     No Known Problems Daughter     No Known Problems Paternal Aunt     Colon cancer Maternal Grandfather [de-identified]    Cancer Maternal Grandfather         colon    No Known Problems Paternal Grandfather        Social History:  Social History     Socioeconomic History    Marital status:      Spouse name: Not on file    Number of children: 2    Years of education: Not on file    Highest education level: Not on file   Occupational History    Not on file   Tobacco Use    Smoking status: Never Smoker    Smokeless tobacco: Never Used   Vaping Use    Vaping Use: Never used   Substance and Sexual Activity    Alcohol use: Yes     Alcohol/week: 0 0 standard drinks     Comment: rarely    Drug use: Never    Sexual activity: Yes     Partners: Male     Birth control/protection: Post-menopausal   Other Topics Concern    Not on file   Social History Narrative        2 children    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, ceramics     Social Determinants of Health     Financial Resource Strain: Not on file   Food Insecurity: Not on file   Transportation Needs: Not on file   Physical Activity: Not on file   Stress: Not on file   Social Connections: Not on file   Intimate Partner Violence: Not on file   Housing Stability: Not on file     Presently lives with spouse  Patient is     Patient is currently employed [employer] [place] [employer]   No Known Allergies    Current Outpatient Medications:     amoxicillin-clavulanate (AUGMENTIN) 875-125 mg per tablet, Take 1 tablet by mouth every 12 (twelve) hours for 7 days, Disp: 14 tablet, Rfl: 0    amphetamine-dextroamphetamine (ADDERALL XR) 20 MG 24 hr capsule, Take 1 capsule (20 mg total) by mouth every morning Max Daily Amount: 20 mg, Disp: 90 capsule, Rfl: 0    amphetamine-dextroamphetamine (ADDERALL, 10MG,) 10 mg tablet, Take 1 tablet (10 mg total) by mouth daily In afternoon as needed in addition to your XR 20 mg in AM Max Daily Amount: 10 mg, Disp: 60 tablet, Rfl: 0    Cholecalciferol (VITAMIN D) 2000 units CAPS, Take by mouth daily, Disp: , Rfl:     ergocalciferol (VITAMIN D2) 50,000 units, Take 1 capsule (50,000 Units total) by mouth once a week (Patient not taking: No sig reported), Disp: 12 capsule, Rfl: 0    Multiple Vitamin (MULTI-VITAMIN DAILY) TABS, Take by mouth, Disp: , Rfl:     sertraline (ZOLOFT) 50 mg tablet, Take 2 tablets (100 mg total) by mouth daily at bedtime, Disp: 180 tablet, Rfl: 0    Review of Systems:  Review of Systems   Constitutional: Negative for activity change, chills, fever and unexpected weight change  HENT: Negative for congestion, ear pain, hearing loss and sore throat  Respiratory: Negative for cough, chest tightness and shortness of breath  Cardiovascular: Negative for chest pain and leg swelling  Gastrointestinal: Negative for abdominal pain, constipation, diarrhea, nausea and vomiting  Endocrine: Negative for polydipsia, polyphagia and polyuria  Genitourinary: Negative for difficulty urinating, dysuria, frequency, menstrual problem, pelvic pain, vaginal discharge and vaginal pain  Skin: Negative for color change and rash  Neurological: Negative for dizziness, numbness and headaches  Psychiatric/Behavioral: Negative for agitation and confusion  Physical Exam:  /68 (BP Location: Right arm, Patient Position: Sitting, Cuff Size: Standard)   Ht 5' (1 524 m)   Wt 62 9 kg (138 lb 9 6 oz)   BMI 27 07 kg/m²    Physical Exam  Constitutional:       General: She is not in acute distress  Appearance: Normal appearance  She is normal weight  Genitourinary:      Vulva, bladder, rectum and urethral meatus normal       No lesions in the vagina  Right Labia: No rash, tenderness or lesions  Left Labia: No tenderness, lesions or rash  No labial fusion noted  No vaginal discharge or tenderness  No vaginal prolapse present  Mild vaginal atrophy present  Right Adnexa: not tender, not full and no mass present  Left Adnexa: not tender, not full and no mass present  No cervical motion tenderness or friability  Uterus is not enlarged or prolapsed  Breasts:      Breasts are soft  Right: No inverted nipple, mass, nipple discharge or skin change  Left: No inverted nipple, mass, nipple discharge or skin change  HENT:      Head: Normocephalic and atraumatic  Nose: Nose normal    Eyes:      Conjunctiva/sclera: Conjunctivae normal       Pupils: Pupils are equal, round, and reactive to light  Cardiovascular:      Rate and Rhythm: Normal rate and regular rhythm  Pulses: Normal pulses  Heart sounds: Normal heart sounds  Pulmonary:      Effort: Pulmonary effort is normal  No respiratory distress  Breath sounds: Normal breath sounds  No wheezing  Abdominal:      General: Abdomen is flat  There is no distension  Palpations: Abdomen is soft  Tenderness: There is no abdominal tenderness  There is no guarding  Musculoskeletal:         General: Normal range of motion  Cervical back: Normal range of motion and neck supple  Neurological:      General: No focal deficit present  Mental Status: She is alert and oriented to person, place, and time  Mental status is at baseline  Skin:     General: Skin is warm and dry  Psychiatric:         Mood and Affect: Mood normal          Behavior: Behavior normal          Thought Content: Thought content normal          Judgment: Judgment normal    Vitals and nursing note reviewed

## 2022-10-12 DIAGNOSIS — M62.838 MUSCLE SPASM: Primary | ICD-10-CM

## 2022-10-12 RX ORDER — CYCLOBENZAPRINE HCL 10 MG
10 TABLET ORAL 3 TIMES DAILY PRN
Qty: 30 TABLET | Refills: 0 | Status: SHIPPED | OUTPATIENT
Start: 2022-10-12 | End: 2023-05-17

## 2022-10-14 ENCOUNTER — CONSULT (OUTPATIENT)
Dept: ENDOCRINOLOGY | Facility: CLINIC | Age: 62
End: 2022-10-14
Payer: COMMERCIAL

## 2022-10-14 VITALS
BODY MASS INDEX: 27.74 KG/M2 | HEIGHT: 60 IN | SYSTOLIC BLOOD PRESSURE: 120 MMHG | HEART RATE: 67 BPM | DIASTOLIC BLOOD PRESSURE: 70 MMHG | OXYGEN SATURATION: 96 % | WEIGHT: 141.3 LBS

## 2022-10-14 DIAGNOSIS — G47.33 OBSTRUCTIVE SLEEP APNEA: ICD-10-CM

## 2022-10-14 DIAGNOSIS — E21.3 HYPERPARATHYROIDISM (HCC): ICD-10-CM

## 2022-10-14 DIAGNOSIS — E78.00 HYPERCHOLESTEROLEMIA: ICD-10-CM

## 2022-10-14 DIAGNOSIS — E03.9 HYPOTHYROIDISM, UNSPECIFIED TYPE: ICD-10-CM

## 2022-10-14 DIAGNOSIS — M81.0 OSTEOPOROSIS WITHOUT CURRENT PATHOLOGICAL FRACTURE, UNSPECIFIED OSTEOPOROSIS TYPE: Primary | ICD-10-CM

## 2022-10-14 DIAGNOSIS — Z98.84 STATUS POST GASTRIC BYPASS FOR OBESITY: ICD-10-CM

## 2022-10-14 PROCEDURE — 99244 OFF/OP CNSLTJ NEW/EST MOD 40: CPT | Performed by: INTERNAL MEDICINE

## 2022-10-14 RX ORDER — CALCIUM CARBONATE 200(500)MG
1 TABLET,CHEWABLE ORAL DAILY
Qty: 180 TABLET | Refills: 0 | Status: SHIPPED | OUTPATIENT
Start: 2022-10-14

## 2022-10-14 NOTE — PATIENT INSTRUCTIONS
Romosozumab-aqqg (By injection)   Romosozumab-aqqg (kxv-rks-PZX-ue-mab - aqqg)  Treats osteoporosis in postmenopausal women  Brand Name(s): Evenity   There may be other brand names for this medicine  When This Medicine Should Not Be Used: This medicine is not right for everyone  You should not receive it if you had an allergic reaction to romosozumab-aqqg, or if you have low calcium levels in the blood (hypocalcemia)  How to Use This Medicine:   Injectable  Your doctor will prescribe your exact dose and tell you how often it should be given  This medicine is given as a shot under your skin  A nurse or other health provider will give you this medicine  Your doctor may give you calcium and vitamin D supplements while you are receiving this medicine to prevent unwanted effects  This medicine should come with a Medication Guide  Ask your pharmacist for a copy if you do not have one  Drugs and Foods to Avoid:   Ask your doctor or pharmacist before using any other medicine, including over-the-counter medicines, vitamins, and herbal products  Some medicines can affect how romosozumab-aqqg works  Tell your doctor if you are using denosumab, bisphosphonate medicine, cancer medicine, or steroids  Warnings While Using This Medicine:   Tell your doctor if you are pregnant or breastfeeding, or if you have kidney disease, cancer, anemia, blood clotting problems, dental problems, or if you had a heart attack or stroke within the last year  This medicine may cause the following problems:  Increased risk of heart attack or stroke  Increased risk for a thigh bone fracture  Tell any doctor or dentist who treats you that you are using this medicine  This medicine may cause jaw problems, especially if you have a tooth pulled or have other dental work  Your doctor will do lab tests at regular visits to check on the effects of this medicine  Keep all appointments    Possible Side Effects While Using This Medicine:   Call your doctor right away if you notice any of these side effects: Allergic reaction: Itching or hives, swelling in your face or hands, swelling or tingling in your mouth or throat, chest tightness, trouble breathing  Chest pain that may spread to your arms, jaw, back, or neck, trouble breathing, nausea, unusual sweating, fainting  Confusion, seizures, uneven heartbeat, muscle cramps or spasms, numbness and tingling around the mouth, fingertips, or feet  New or unusual thigh, hip, or groin pain  Numbness or weakness in your arm or leg, or on one side of your body  Pain in your lower leg (calf)  Sudden or severe headache, problems with vision, speech, or walking  If you notice these less serious side effects, talk with your doctor:   Headache  Joint or muscle pain  Pain, itching, burning, swelling, or a lump under your skin where the shot was given  If you notice other side effects that you think are caused by this medicine, tell your doctor  Call your doctor for medical advice about side effects  You may report side effects to FDA at 6-147-FDA-9390    © Copyright [employer] 2022 Information is for End User's use only and may not be sold, redistributed or otherwise used for commercial purposes  The above information is an  only  It is not intended as medical advice for individual conditions or treatments  Talk to your doctor, nurse or pharmacist before following any medical regimen to see if it is safe and effective for you  Denosumab (By injection)   Denosumab (jrn-GKN-nz-mab)  Treats osteoporosis, bone cancer, hypercalcemia, and other bone problems in patients who have cancer  Brand Name(s): Prolia, Xgeva   There may be other brand names for this medicine  When This Medicine Should Not Be Used: This medicine is not right for everyone  You should not receive it if you had an allergic reaction to denosumab, or if you are pregnant    How to Use This Medicine:   Injectable  A doctor or other health professional will give you this medicine  It is usually given as a shot under the skin of your upper arm, upper thigh, or stomach  You may receive other medicines (including calcium supplements, Vitamin D) to prevent unwanted effects  This medicine should come with a Medication Guide  Ask your pharmacist for a copy if you do not have one  Missed dose: Call your doctor or pharmacist for instructions  Drugs and Foods to Avoid:   Ask your doctor or pharmacist before using any other medicine, including over-the-counter medicines, vitamins, and herbal products  Do not use Prolia® and Xgeva® together  They contain the same medicine  Some medicines can affect how denosumab works  Tell your doctor if you are also using medicine that weakens your immune system, including a steroid or cancer medicine  Warnings While Using This Medicine: This medicine may cause birth defects if either partner is using it during conception or pregnancy  Tell your doctor right away if you or your partner becomes pregnant  Use an effective form of birth control during treatment with this medicine and for at least 5 months after the last dose  Tell your doctor if you are breastfeeding, or if you have kidney disease, diabetes, gum disease, allergy to latex, or a history of fractures  Tell your doctor if you have problems with your thyroid, parathyroid, or digestive system  This medicine may cause the following problems:  Increased risk of broken thigh bone  Increased risk of infections  Serious skin reactions  Severe bone, joint, or muscle pain  This medicine can cause jaw problems  You must have regular dental exams while you are being treated with this medicine  Tell your dentist that you are receiving this medicine  Practice good oral hygiene  Do not suddenly stop using this medicine without checking first with your doctor  Doing so may increase your risk for more fractures   Talk to your doctor about other medicines that you can take  Your doctor will do lab tests at regular visits to check on the effects of this medicine  Keep all appointments  Possible Side Effects While Using This Medicine:   Call your doctor right away if you notice any of these side effects: Allergic reaction: Itching or hives, swelling in your face or hands, swelling or tingling in your mouth or throat, chest tightness, trouble breathing  Blistering, peeling, red skin rash  Chest pain, fast or uneven heartbeat, trouble breathing  Fever, chills, cough, sore throat, body aches  Lightheadedness, dizziness, fainting  Muscle spasms or twitching, numbness or tingling in your fingers, toes, or lips  Pain or burning during urination, change in how much or how often you urinate  Pain, swelling, heavy feeling, or numbness in your mouth or jaw, loose teeth or other teeth problems  Severe bone, joint, or muscle pain  Unusual pain in your thigh, groin, or hip  If you notice these less serious side effects, talk with your doctor:   Diarrhea, nausea  Redness, pain, itching, burning, swelling, or a lump under your skin where the shot was given  Tiredness or weakness  If you notice other side effects that you think are caused by this medicine, tell your doctor  Call your doctor for medical advice about side effects  You may report side effects to FDA at 0-287-FDA-6332    © Copyright [employer] 2022 Information is for End User's use only and may not be sold, redistributed or otherwise used for commercial purposes  The above information is an  only  It is not intended as medical advice for individual conditions or treatments  Talk to your doctor, nurse or pharmacist before following any medical regimen to see if it is safe and effective for you  Zoledronic Acid (By injection)   Zoledronic Acid (hpa-jm-JNWR-ik AS-id)  Treats high blood calcium levels  Also treats bone damage caused by Paget disease, multiple myeloma, and cancers that spread to the bone  Also treats osteoporosis and reduces the risk of hip fractures in certain patients  Brand Name(s): Reclast, Zoledronic Acid Novaplus   There may be other brand names for this medicine  When This Medicine Should Not Be Used: This medicine is not right for everyone  You should not receive it if you had an allergic reaction to zoledronic acid, or if you are pregnant  How to Use This Medicine:   Injectable  A nurse or other health provider will give you this medicine  Your doctor will prescribe your dose and schedule  This medicine is given through a needle placed in a vein  Your doctor may tell you to drink extra liquids before your treatment to prevent kidney problems  Your doctor may also give you vitamin D and calcium supplements  Tell your doctor if you are not able to take these medicines  This medicine should come with a Medication Guide  Ask your pharmacist for a copy if you do not have one  Missed dose: You must use this medicine on a fixed schedule  Call your doctor or pharmacist if you miss a dose  Drugs and Foods to Avoid:   Ask your doctor or pharmacist before using any other medicine, including over-the-counter medicines, vitamins, and herbal products  Do not use other medicines that also contain zoledronic acid  Do not use zoledronic acid together with another bisphosphonate medicine  Some foods and medicines can affect how zoledronic acid works  Tell your doctor if you are using digoxin, antibiotics, diuretics (water pills), an NSAID pain or arthritis medicine (such as aspirin, celecoxib, ibuprofen, naproxen), steroid medicines, or cancer medicines  Warnings While Using This Medicine: It is not safe to take this medicine during pregnancy  It could harm an unborn baby  Tell your doctor right away if you become pregnant    Tell your doctor if you are breastfeeding, or if you have kidney disease, anemia, aspirin-sensitive asthma, bleeding problems, cancer, congestive heart failure, low blood calcium levels, stomach absorption problems, mineral imbalance, dental problems or gum disease  Also tell your doctor if you had surgery on your bowel or parathyroid or thyroid gland  This medicine may cause the following problems:  Jaw or teeth problems  Severe bone, joint, or muscle pain  Increased risk of thigh bone fracture  Low calcium levels in your blood  You must have regular dental exams while you are being treated with this medicine  Tell your dentist or oral surgeon that you are using this medicine  Your doctor will do lab tests at regular visits to check on the effects of this medicine  Keep all appointments  Possible Side Effects While Using This Medicine:   Call your doctor right away if you notice any of these side effects: Allergic reaction: Itching or hives, swelling in your face or hands, swelling or tingling in your mouth or throat, chest tightness, trouble breathing  Chest pain, trouble breathing, fast or uneven heartbeat  Decrease in how much or how often you urinate, blood in the urine, lower back or side pain, burning or painful urination  Muscle spasm or twitching, or numbness or tingling in your fingers, feet, or around your mouth  Pain, swelling, or numbness in the mouth or jaw, loose teeth or other teeth problems  Severe muscle, bone, or joint pain  Unusual pain in your thigh, groin, or hip  If you notice these less serious side effects, talk with your doctor:   Fever, chills, cough, sore throat, and body aches  Headache  Mild nausea, constipation, diarrhea, stomach pain or upset  Redness, pain, or swelling of your skin where the needle is placed  If you notice other side effects that you think are caused by this medicine, tell your doctor  Call your doctor for medical advice about side effects   You may report side effects to FDA at 8-642-FDA-4714    © Copyright [employer] 2022 Information is for End User's use only and may not be sold, redistributed or otherwise used for commercial purposes  The above information is an  only  It is not intended as medical advice for individual conditions or treatments  Talk to your doctor, nurse or pharmacist before following any medical regimen to see if it is safe and effective for you

## 2022-10-14 NOTE — PROGRESS NOTES
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, FREIDA, HTN, HLD and elevated PTH  She reports persistent fatigue with working 7 days a week, waking up tired, polyuria and polydipsia for about 1 year, very occasional palpitation and some brain fog  No constipation or bone pain  Some muscle cramps intermittently  Hx of menopause with LMP age 38  She had hx of lt and rt wrist fractures 4 yrs ago  Recent fall and off balance more recently  Max adult weight 204lbs prior to RYGB  Presently 141lb  She had a thyroid US 8/8/22 that showed a 6mm lesion posterior to Lt upper pole thyroid  Follow up CT parathyroid showed 3mm lesion without characteristic enhancement for a parathyroid adenoma  DXA 5/21/21: Tscores -3 3 LS, -2 5 LTH and -3 8LFN  10yr FRAX 8% hip and 20% major osteoporotic fracture  No History of external radiation, recent Iodine loading or radiological diagnostic studies  No difficulty with swallowing or breathing or change in voice  FH : mother had severe osteoporosis age 52's        Patient Active Problem List   Diagnosis   • History of ductal carcinoma in situ (DCIS) of breast   • Hypercholesterolemia   • Obstructive sleep apnea   • Status post gastric bypass for obesity   • Mixed anxiety and depressive disorder   • Premature menopause   • ADHD   • Family history of colon cancer   • Dysphagia   • Hypothyroidism   • Elevated bilirubin   • Osteoporosis without current pathological fracture     Past Medical History:   Diagnosis Date   • ADHD    • Anxiety    • Arthritis    • Asthma    • Breast cancer ([address] ) 01/01/2012   • Cancer ([address] ) 08/2012    DCIS-right breast   • Closed fracture of radial styloid    • CPAP (continuous positive airway pressure) dependence    • Endometriosis    • Family history of colon cancer in mother    • Forceps delivery     1991 daughter   • GERD (gastroesophageal reflux disease)    • H/O mitral valve prolapse     no regurgitation   • Hiatal hernia    • History of radiation therapy    • Hyperlipidemia    • Infertility, female    • Normal delivery     1998 daughter   • Sleep apnea     on cpap   • TB lung, latent     treated with INH (in her 29's)      Past Surgical History:   Procedure Laterality Date   • BREAST BIOPSY Right 04/01/2012    biopsy breast percutaneous needle core   • BREAST LUMPECTOMY Right 08/01/2012   • CHOLECYSTECTOMY  01/23/2013   • COLONOSCOPY     • DILATION AND CURETTAGE OF UTERUS     • ENDOMETRIAL BIOPSY      without cervical dilation   • KNEE ARTHROSCOPY      Plica syndrome   • LAPAROSCOPY      Exploratory 1990 & 1994   • SALUD-EN-Y PROCEDURE  01/23/2013    Dr Trina Lyons   • UPPER GASTROINTESTINAL ENDOSCOPY     • US GUIDANCE  5/14/2013      Family History   Problem Relation Age of Onset   • Colon cancer Mother 48   • Cancer Mother         colon   • Osteoporosis Mother    • Stroke Father    • Hypertension Father    • No Known Problems Maternal Grandmother    • Hypertension Family    • Mitral valve prolapse Family    • Osteoporosis Family    • Varicose Veins Family    • No Known Problems Sister    • No Known Problems Daughter    • Hypertension Paternal Grandmother    • Stroke Paternal Grandmother    • No Known Problems Daughter    • No Known Problems Paternal Aunt    • Colon cancer Maternal Grandfather [de-identified]   • Cancer Maternal Grandfather         colon   • No Known Problems Paternal Grandfather      Social History     Tobacco Use   • Smoking status: Never Smoker   • Smokeless tobacco: Never Used   Substance Use Topics   • Alcohol use: Yes     Alcohol/week: 0 0 standard drinks     Comment: rarely     No Known Allergies      Review of Systems   Constitutional: Positive for fatigue  HENT: Negative  Eyes: Negative  Respiratory: Negative  Cardiovascular: Negative      Gastrointestinal: Negative  Endocrine: Negative  Musculoskeletal: Negative  Skin: Negative  Allergic/Immunologic: Negative  Neurological: Negative  Hematological: Negative  Psychiatric/Behavioral: Negative  Current Outpatient Medications:   •  amphetamine-dextroamphetamine (ADDERALL XR) 20 MG 24 hr capsule, Take 1 capsule (20 mg total) by mouth every morning Max Daily Amount: 20 mg, Disp: 90 capsule, Rfl: 0  •  amphetamine-dextroamphetamine (ADDERALL, 10MG,) 10 mg tablet, Take 1 tablet (10 mg total) by mouth daily In afternoon as needed in addition to your XR 20 mg in AM Max Daily Amount: 10 mg, Disp: 60 tablet, Rfl: 0  •  Cholecalciferol (VITAMIN D) 2000 units CAPS, Take by mouth 1000 units Mon- Sat 5000 unitson Sunday, Disp: , Rfl:   •  ergocalciferol (VITAMIN D2) 50,000 units, Take 1 capsule (50,000 Units total) by mouth once a week (Patient not taking: No sig reported), Disp: 12 capsule, Rfl: 0  •  Multiple Vitamin (MULTI-VITAMIN DAILY) TABS, Take by mouth, Disp: , Rfl:   •  sertraline (ZOLOFT) 50 mg tablet, Take 2 tablets (100 mg total) by mouth daily at bedtime, Disp: 180 tablet, Rfl: 0  •  cyclobenzaprine (FLEXERIL) 10 mg tablet, Take 1 tablet (10 mg total) by mouth 3 (three) times a day as needed for muscle spasms (Patient not taking: Reported on 10/14/2022), Disp: 30 tablet, Rfl: 0    Physical Exam:  Body mass index is 27 6 kg/m²  /70   Pulse 67   Ht 5' (1 524 m)   Wt 64 1 kg (141 lb 4 8 oz)   SpO2 96%   BMI 27 60 kg/m²        Physical Exam  Constitutional:       Appearance: She is well-developed  HENT:      Head: Normocephalic  Eyes:      Pupils: Pupils are equal, round, and reactive to light  Neck:      Thyroid: No thyromegaly  Cardiovascular:      Rate and Rhythm: Normal rate  Heart sounds: Normal heart sounds  Pulmonary:      Effort: Pulmonary effort is normal       Breath sounds: Normal breath sounds     Abdominal:      General: Bowel sounds are normal  Palpations: Abdomen is soft  Musculoskeletal:         General: No deformity  Cervical back: Normal range of motion  Skin:     Capillary Refill: Capillary refill takes less than 2 seconds  Coloration: Skin is not pale  Findings: No rash  Neurological:      Mental Status: She is alert and oriented to person, place, and time  Labs:     Lab Results   Component Value Date    SNS0AXRXIRSD 4 160 06/07/2022       Lab Results   Component Value Date    CREATININE 0 84 07/21/2022    CREATININE 0 77 01/27/2022    CREATININE 0 70 01/18/2021    BUN 24 07/21/2022    K 3 9 07/21/2022     07/21/2022    CO2 28 07/21/2022     eGFR   Date Value Ref Range Status   07/21/2022 74 ml/min/1 73sq m Final       Lab Results   Component Value Date    ALT 25 07/21/2022    AST 19 07/21/2022    ALKPHOS 89 07/21/2022       Lab Results   Component Value Date    CHOLESTEROL 220 (H) 06/07/2022    CHOLESTEROL 227 (H) 01/27/2022    CHOLESTEROL 203 (H) 01/18/2021     Lab Results   Component Value Date    HDL 74 06/07/2022    HDL 70 01/27/2022    HDL 69 01/18/2021     Lab Results   Component Value Date    TRIG 56 06/07/2022    TRIG 85 01/27/2022    TRIG 87 01/18/2021     Lab Results   Component Value Date    NONHDLC 146 06/07/2022    [place] 157 01/27/2022    [place] 134 01/18/2021         Impression:  1  Hyperparathyroidism ([address] )    2  Hypothyroidism, unspecified type    3  Obstructive sleep apnea    4  Status post gastric bypass for obesity    5  Osteoporosis without current pathological fracture, unspecified osteoporosis type    6  Hypercholesterolemia         Plan:    Diagnoses and all orders for this visit:    Osteoporosis without current pathological fracture, unspecified osteoporosis type  She has severe osteoporosis with risk factors including FH, early menopause, RYGB, high FRAX score and recent falls and past fractures  She may also have a component of osteomalacia associated with RYGB    Note hx of breast cancer s/p radiation  No antiestrogen therapy  Today we discussed all aspects of osteoporosis including pathophysiology, risk factors, complications, diet, calcium 1200mg/day, vitamin D supplementation to maintain goal >30ng/dL, lifestyle modifications, medical fitness training, goals of therapy, follow up needs and medications including Alendronate, zolendronate, denosumab, romosozumab, forteo and tymlos  She will benefit from anabolic agents given severe osteoporosis and high risk of future fracture but is not a candidate for PTH analogues  Suggested romosozumab followed by prolia or reclast in 12 months  Written information provided about medications  Patient will review and let me know  Tentative follow up in 4-6 weeks for review  -     Comprehensive metabolic panel; Future  -     Phosphorus; Future  -     PTH, intact; Future  -     Protein electrophoresis, urine  -     Protein electrophoresis, serum; Future  -     C-Telopeptide; Future  -     NTX Panel, Urine  -     Vitamin D 25 hydroxy; Future  -     calcium carbonate (TUMS) 500 mg chewable tablet; Chew 1 tablet (500 mg total) daily  - Vit D3 5000IU daily OTC    Hyperparathyroidism ([facility] [address] )  She has elevated PTH in context of relatively low vitamin D and normal calcium  There was 1 episode of hypercalcemia corrected calcium 10 8mg/dL but PTH was not checked then  Elevated PTH either from a Primary or secondary hyperparathyroidism is possible in this scenario  CT parathyroid shows a 3mm lesion which may represent an adenoma but she does not have the clinical characteristics or hypercalcemia to warrant a CT parathyroid  Today we discussed options and agreed to recheck all labs as listed and then make changes as needed  -     Ambulatory Referral to Endocrinology  -     PTH, intact; Future    Hypothyroidism, unspecified type  -     TSH, 3rd generation; Future  -     T4, free;  Future    Obstructive sleep apnea    Status post gastric bypass for obesity    Hypercholesterolemia        I have spent 55 minutes with patient today in which greater than 50% of this time was spent in counseling/coordination of care  All questioned fully answered  She will call me if any problems arise  Educated/ Counseled patient on diagnostic test results, prognosis, risk vs benefit of treatment options, importance of treatment compliance, healthy life and lifestyle choices        [address]

## 2022-10-31 ENCOUNTER — TELEPHONE (OUTPATIENT)
Dept: FAMILY MEDICINE CLINIC | Facility: CLINIC | Age: 62
End: 2022-10-31

## 2022-10-31 DIAGNOSIS — F90.9 ATTENTION DEFICIT HYPERACTIVITY DISORDER (ADHD), UNSPECIFIED ADHD TYPE: ICD-10-CM

## 2022-10-31 RX ORDER — DEXTROAMPHETAMINE SACCHARATE, AMPHETAMINE ASPARTATE, DEXTROAMPHETAMINE SULFATE AND AMPHETAMINE SULFATE 2.5; 2.5; 2.5; 2.5 MG/1; MG/1; MG/1; MG/1
10 TABLET ORAL DAILY
Qty: 30 TABLET | Refills: 0 | Status: SHIPPED | OUTPATIENT
Start: 2022-10-31 | End: 2022-11-02 | Stop reason: SDUPTHER

## 2022-10-31 RX ORDER — DEXTROAMPHETAMINE SACCHARATE, AMPHETAMINE ASPARTATE MONOHYDRATE, DEXTROAMPHETAMINE SULFATE AND AMPHETAMINE SULFATE 5; 5; 5; 5 MG/1; MG/1; MG/1; MG/1
20 CAPSULE, EXTENDED RELEASE ORAL EVERY MORNING
Qty: 30 CAPSULE | Refills: 0 | Status: SHIPPED | OUTPATIENT
Start: 2022-10-31 | End: 2022-11-01

## 2022-10-31 NOTE — TELEPHONE ENCOUNTER
Needs a refill for amphetamine-dextroamphetamine 20mg and amphetamine-dextroamphetamine 10mg,     [facility] pharmacy, [facility] need today she is out  Patient will call back to reschedule her appt  After she gets her schedule    Dr Marlo Albert has no 5pm appts for the month of November right now

## 2022-11-01 ENCOUNTER — TELEPHONE (OUTPATIENT)
Dept: FAMILY MEDICINE CLINIC | Facility: CLINIC | Age: 62
End: 2022-11-01

## 2022-11-01 DIAGNOSIS — F90.9 ATTENTION DEFICIT HYPERACTIVITY DISORDER (ADHD), UNSPECIFIED ADHD TYPE: ICD-10-CM

## 2022-11-01 RX ORDER — DEXTROAMPHETAMINE SACCHARATE, AMPHETAMINE ASPARTATE MONOHYDRATE, DEXTROAMPHETAMINE SULFATE AND AMPHETAMINE SULFATE 2.5; 2.5; 2.5; 2.5 MG/1; MG/1; MG/1; MG/1
20 CAPSULE, EXTENDED RELEASE ORAL EVERY MORNING
Qty: 180 CAPSULE | Refills: 0 | Status: SHIPPED | OUTPATIENT
Start: 2022-11-01 | End: 2022-11-02

## 2022-11-01 NOTE — TELEPHONE ENCOUNTER
Adderall XR 20mg is on back ordered  Pharmacy wanted to know if they should fill the 10mg instead  Left patient a message to call back to see what she wants to do

## 2022-11-01 NOTE — TELEPHONE ENCOUNTER
Spoke to pt and she is going to call other pharmacies to see if they have a supply of what she is on   She will call us back and if they don't she will have us call in the 10mg tablets

## 2022-11-02 DIAGNOSIS — F90.9 ATTENTION DEFICIT HYPERACTIVITY DISORDER (ADHD), UNSPECIFIED ADHD TYPE: ICD-10-CM

## 2022-11-02 RX ORDER — DEXTROAMPHETAMINE SACCHARATE, AMPHETAMINE ASPARTATE MONOHYDRATE, DEXTROAMPHETAMINE SULFATE AND AMPHETAMINE SULFATE 2.5; 2.5; 2.5; 2.5 MG/1; MG/1; MG/1; MG/1
20 CAPSULE, EXTENDED RELEASE ORAL EVERY MORNING
Qty: 180 CAPSULE | Refills: 0 | Status: CANCELLED | OUTPATIENT
Start: 2022-11-02 | End: 2023-01-31

## 2022-11-02 RX ORDER — DEXTROAMPHETAMINE SACCHARATE, AMPHETAMINE ASPARTATE, DEXTROAMPHETAMINE SULFATE AND AMPHETAMINE SULFATE 2.5; 2.5; 2.5; 2.5 MG/1; MG/1; MG/1; MG/1
10 TABLET ORAL DAILY
Qty: 90 TABLET | Refills: 0 | Status: SHIPPED | OUTPATIENT
Start: 2022-11-02 | End: 2022-11-03 | Stop reason: SDUPTHER

## 2022-11-02 RX ORDER — DEXTROAMPHETAMINE SACCHARATE, AMPHETAMINE ASPARTATE MONOHYDRATE, DEXTROAMPHETAMINE SULFATE AND AMPHETAMINE SULFATE 5; 5; 5; 5 MG/1; MG/1; MG/1; MG/1
20 CAPSULE, EXTENDED RELEASE ORAL EVERY MORNING
Qty: 90 CAPSULE | Refills: 0 | Status: SHIPPED | OUTPATIENT
Start: 2022-11-02 | End: 2022-11-03 | Stop reason: SDUPTHER

## 2022-11-03 DIAGNOSIS — R06.2 WHEEZING: Primary | ICD-10-CM

## 2022-11-03 DIAGNOSIS — F90.9 ATTENTION DEFICIT HYPERACTIVITY DISORDER (ADHD), UNSPECIFIED ADHD TYPE: ICD-10-CM

## 2022-11-03 RX ORDER — DEXTROAMPHETAMINE SACCHARATE, AMPHETAMINE ASPARTATE, DEXTROAMPHETAMINE SULFATE AND AMPHETAMINE SULFATE 2.5; 2.5; 2.5; 2.5 MG/1; MG/1; MG/1; MG/1
10 TABLET ORAL DAILY
Qty: 30 TABLET | Refills: 0 | Status: SHIPPED | OUTPATIENT
Start: 2022-11-03 | End: 2022-11-17 | Stop reason: SDUPTHER

## 2022-11-03 RX ORDER — DEXTROAMPHETAMINE SACCHARATE, AMPHETAMINE ASPARTATE MONOHYDRATE, DEXTROAMPHETAMINE SULFATE AND AMPHETAMINE SULFATE 5; 5; 5; 5 MG/1; MG/1; MG/1; MG/1
20 CAPSULE, EXTENDED RELEASE ORAL EVERY MORNING
Qty: 30 CAPSULE | Refills: 0 | Status: SHIPPED | OUTPATIENT
Start: 2022-11-03 | End: 2022-11-17 | Stop reason: SDUPTHER

## 2022-11-08 ENCOUNTER — APPOINTMENT (OUTPATIENT)
Dept: LAB | Age: 62
End: 2022-11-08

## 2022-11-08 DIAGNOSIS — E21.3 HYPERPARATHYROIDISM (HCC): ICD-10-CM

## 2022-11-08 DIAGNOSIS — M81.0 OSTEOPOROSIS WITHOUT CURRENT PATHOLOGICAL FRACTURE, UNSPECIFIED OSTEOPOROSIS TYPE: ICD-10-CM

## 2022-11-08 DIAGNOSIS — E03.9 HYPOTHYROIDISM, UNSPECIFIED TYPE: ICD-10-CM

## 2022-11-08 LAB
25(OH)D3 SERPL-MCNC: 61.9 NG/ML (ref 30–100)
ALBUMIN SERPL BCP-MCNC: 3.9 G/DL (ref 3.5–5)
ALP SERPL-CCNC: 112 U/L (ref 46–116)
ALT SERPL W P-5'-P-CCNC: 32 U/L (ref 12–78)
ANION GAP SERPL CALCULATED.3IONS-SCNC: 6 MMOL/L (ref 4–13)
AST SERPL W P-5'-P-CCNC: 22 U/L (ref 5–45)
BILIRUB SERPL-MCNC: 0.72 MG/DL (ref 0.2–1)
BUN SERPL-MCNC: 18 MG/DL (ref 5–25)
CALCIUM SERPL-MCNC: 9.3 MG/DL (ref 8.3–10.1)
CHLORIDE SERPL-SCNC: 106 MMOL/L (ref 96–108)
CO2 SERPL-SCNC: 27 MMOL/L (ref 21–32)
CREAT SERPL-MCNC: 0.71 MG/DL (ref 0.6–1.3)
GFR SERPL CREATININE-BSD FRML MDRD: 90 ML/MIN/1.73SQ M
GLUCOSE P FAST SERPL-MCNC: 83 MG/DL (ref 65–99)
PHOSPHATE SERPL-MCNC: 3.4 MG/DL (ref 2.3–4.1)
POTASSIUM SERPL-SCNC: 3.7 MMOL/L (ref 3.5–5.3)
PROT SERPL-MCNC: 7.6 G/DL (ref 6.4–8.4)
SODIUM SERPL-SCNC: 139 MMOL/L (ref 135–147)
T4 FREE SERPL-MCNC: 0.8 NG/DL (ref 0.76–1.46)
TSH SERPL DL<=0.05 MIU/L-ACNC: 3.95 UIU/ML (ref 0.45–4.5)

## 2022-11-09 LAB — PTH-INTACT SERPL-MCNC: 63.7 PG/ML (ref 18.4–80.1)

## 2022-11-10 LAB
ALBUMIN UR ELPH-MCNC: 100 %
ALPHA1 GLOB MFR UR ELPH: 0 %
ALPHA2 GLOB MFR UR ELPH: 0 %
B-GLOBULIN MFR UR ELPH: 0 %
GAMMA GLOB MFR UR ELPH: 0 %
PROT PATTERN UR ELPH-IMP: NORMAL
PROT UR-MCNC: 10 MG/DL

## 2022-11-14 ENCOUNTER — OFFICE VISIT (OUTPATIENT)
Dept: ENDOCRINOLOGY | Facility: CLINIC | Age: 62
End: 2022-11-14

## 2022-11-14 VITALS
DIASTOLIC BLOOD PRESSURE: 78 MMHG | SYSTOLIC BLOOD PRESSURE: 126 MMHG | HEIGHT: 60 IN | BODY MASS INDEX: 28.07 KG/M2 | HEART RATE: 70 BPM | WEIGHT: 143 LBS | OXYGEN SATURATION: 90 %

## 2022-11-14 DIAGNOSIS — E21.3 HYPERPARATHYROIDISM (HCC): ICD-10-CM

## 2022-11-14 DIAGNOSIS — G47.33 OBSTRUCTIVE SLEEP APNEA: ICD-10-CM

## 2022-11-14 DIAGNOSIS — K90.89 OTHER SPECIFIED INTESTINAL MALABSORPTION: ICD-10-CM

## 2022-11-14 DIAGNOSIS — M81.0 OSTEOPOROSIS WITHOUT CURRENT PATHOLOGICAL FRACTURE, UNSPECIFIED OSTEOPOROSIS TYPE: Primary | ICD-10-CM

## 2022-11-14 PROBLEM — E03.9 HYPOTHYROIDISM: Status: RESOLVED | Noted: 2022-04-19 | Resolved: 2022-11-14

## 2022-11-14 LAB
COLLAGEN CTX SERPL-MCNC: 531 PG/ML
COLLAGEN NTX UR-SCNC: 822 NMOL BCE
COLLAGEN NTX/CREAT UR-SRTO: 70 NM BCE/MM CR (ref 0–89)
CREAT UR-MCNC: 133.5 MG/DL
INTERPRETIVE GUIDE:: NORMAL

## 2022-11-14 RX ORDER — ALENDRONATE SODIUM 70 MG/1
TABLET ORAL
Qty: 12 TABLET | Refills: 3 | Status: SHIPPED | OUTPATIENT
Start: 2022-11-14

## 2022-11-14 RX ORDER — PHENOL 1.4 %
600 AEROSOL, SPRAY (ML) MUCOUS MEMBRANE 2 TIMES DAILY WITH MEALS
COMMUNITY

## 2022-11-14 NOTE — PATIENT INSTRUCTIONS
Alendronate (By mouth)   Alendronate ([id number])  Treats or prevents osteoporosis  Also treats Paget disease of the bone  Brand Name(s): Binosto Fosamax   There may be other brand names for this medicine  When This Medicine Should Not Be Used: This medicine is not right for everyone  Do not use it if you had an allergic reaction to alendronate, or if you have esophagus problems or trouble swallowing  Do not use it if you cannot stand or sit upright for at least 30 minutes after you take the medicine  How to Use This Medicine:   Liquid, Tablet, Fizzy Tablet  Take this medicine in the morning on an empty stomach  Follow the directions exactly to lower the risk of esophagus problems  Sit or stand while you take this medicine  Do not lie down for at least 30 minutes after you take the medicine, and do not lie down until after you have eaten  Use plain water to take your medicine  The medicine may not work as well if you use other liquids  Tablet: Swallow whole with 6 to 8 ounces of water  Do not chew or suck on the tablet  Oral liquid: Measure the oral liquid medicine with a marked measuring spoon, oral syringe, or medicine cup  Drink at least 2 ounces (1/4 cup) of water after you take the liquid medicine  Effervescent tablet: Dissolve the tablet in 4 ounces of room temperature water  Wait at least 5 minutes after the bubbling stops  Then stir the solution for 10 seconds and drink it  Wait at least 30 minutes after you take this medicine before you eat or drink or take any other medicine  This will help your body absorb the medicine  The effervescent tablet should not be chewed, swallowed whole, or dissolved in the mouth to prevent the risk of mouth or throat irritation  This medicine should come with a Medication Guide  Ask your pharmacist for a copy if you do not have one  Missed dose: Take a dose the next morning  Do not take 2 tablets on the same day   Then go back to your regular schedule  Store the medicine in a closed container at room temperature, away from heat, moisture, and direct light  Keep the effervescent tablets in the blister pack until you are ready to use them  Drugs and Foods to Avoid:   Ask your doctor or pharmacist before using any other medicine, including over-the-counter medicines, vitamins, and herbal products  Some medicines can affect how alendronate works  Tell your doctor if you are using any of the following:   Levothyroxine, ranitidine injection  Cancer medicines  NSAIDs (including aspirin, celecoxib, diclofenac, ibuprofen, naproxen)  Steroid medicine (including as hydrocortisone, methylprednisolone, prednisone, prednisolone, dexamethasone)  Take alendronate at least 30 minutes before you take any other oral medicine, including aluminum, magnesium, iron, or calcium supplements, or antacids  Warnings While Using This Medicine:   Tell your doctor if you are pregnant or breastfeeding, or if you have kidney disease, heartburn, anemia, blood clotting problems, ulcers or other stomach or bowel problems, a vitamin D deficiency, or a history of cancer  Tell your doctor if you have dental problems or if you wear dentures  Also tell your doctor if you smoke or drink alcohol  This medicine may cause the following problems:   Damage to your esophagus  Increased risk for a thigh bone fracture  Low calcium levels  Tell any doctor or dentist who treats you that you are using this medicine  This medicine may cause jaw problems, especially if you have a tooth pulled or other dental work  The effervescent tablet contains sodium  Tell your doctor if you are on a low-salt diet  Your doctor will check your progress and the effects of this medicine at regular visits  Keep all appointments  Keep all medicine out of the reach of children  Never share your medicine with anyone    Possible Side Effects While Using This Medicine:   Call your doctor right away if you notice any of these side effects: Allergic reaction: Itching or hives, swelling in your face or hands, swelling or tingling in your mouth or throat, chest tightness, trouble breathing  Blistering, peeling, red skin rash  Chest pain, new or worsening heartburn, or a burning feeling in your throat  Muscle spasms or twitching, tingling or numbness in your fingers, toes, or around your mouth  Pain or difficulty when swallowing  Pain, swelling, numbness, or a heavy feeling in your mouth or jaw, loose teeth, or other tooth problems  Severe bone, joint, or muscle pain  Unusual pain in your thigh, groin, or hip  If you notice these less serious side effects, talk with your doctor:   Mild stomach pain or upset  If you notice other side effects that you think are caused by this medicine, tell your doctor  Call your doctor for medical advice about side effects  You may report side effects to FDA at 7-250-FDA-8795    © Copyright [employer] 2022 Information is for End User's use only and may not be sold, redistributed or otherwise used for commercial purposes  The above information is an  only  It is not intended as medical advice for individual conditions or treatments  Talk to your doctor, nurse or pharmacist before following any medical regimen to see if it is safe and effective for you

## 2022-11-14 NOTE — PROGRESS NOTES
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, FREIDA, HTN, HLD and elevated PTH  Last visit was 10/14/22      She reports improved fatigue since taking vitamin D and calcium      Hx of menopause with LMP age 38  She had hx of lt and rt wrist fractures 4 yrs ago  Recent fall and off balance more recently      Max adult weight 204lbs prior to RYGB  Presently 141lb      She had a thyroid US 8/8/22 that showed a 6mm lesion posterior to Lt upper pole thyroid  Follow up CT parathyroid showed 3mm lesion without characteristic enhancement for a parathyroid adenoma      DXA 5/21/21: Tscores -3 3 LS, -2 5 LTH and -3 8LFN  10yr FRAX 8% hip and 20% major osteoporotic fracture      No History of external radiation, recent Iodine loading or radiological diagnostic studies     No difficulty with swallowing or breathing or change in voice      FH : mother had severe osteoporosis age 52's        Patient Active Problem List   Diagnosis   • History of ductal carcinoma in situ (DCIS) of breast   • Hypercholesterolemia   • Obstructive sleep apnea   • Status post gastric bypass for obesity   • Mixed anxiety and depressive disorder   • Premature menopause   • ADHD   • Family history of colon cancer   • Dysphagia   • Hypothyroidism   • Elevated bilirubin   • Osteoporosis without current pathological fracture     Past Medical History:   Diagnosis Date   • ADHD    • Anxiety    • Arthritis    • Asthma    • Breast cancer ([facility] [address] ) 01/01/2012   • [facility] 08/2012    DCIS-right breast   • Closed fracture of radial styloid    • CPAP (continuous positive airway pressure) dependence    • Endometriosis    • Family history of colon cancer in mother    • Forceps delivery     1991 daughter   • GERD (gastroesophageal reflux disease)    • H/O mitral valve prolapse no regurgitation   • Hiatal hernia    • History of radiation therapy    • Hyperlipidemia    • Infertility, female    • Normal delivery     1998 daughter   • Sleep apnea     on cpap   • TB lung, latent     treated with INH (in her 29's)      Past Surgical History:   Procedure Laterality Date   • BREAST BIOPSY Right 04/01/2012    biopsy breast percutaneous needle core   • BREAST LUMPECTOMY Right 08/01/2012   • CHOLECYSTECTOMY  01/23/2013   • COLONOSCOPY     • DILATION AND CURETTAGE OF UTERUS     • ENDOMETRIAL BIOPSY      without cervical dilation   • KNEE ARTHROSCOPY      Plica syndrome   • LAPAROSCOPY      Exploratory 1990 & 1994   • SALUD-EN-Y PROCEDURE  01/23/2013    Dr Leo Sparrow   • UPPER GASTROINTESTINAL ENDOSCOPY     • US GUIDANCE  5/14/2013      Family History   Problem Relation Age of Onset   • Colon cancer Mother 48   • Cancer Mother         colon   • Osteoporosis Mother    • Stroke Father    • Hypertension Father    • No Known Problems Maternal Grandmother    • Hypertension Family    • Mitral valve prolapse Family    • Osteoporosis Family    • Varicose Veins Family    • No Known Problems Sister    • No Known Problems Daughter    • Hypertension Paternal Grandmother    • Stroke Paternal Grandmother    • No Known Problems Daughter    • No Known Problems Paternal Aunt    • Colon cancer Maternal Grandfather [de-identified]   • Cancer Maternal Grandfather         colon   • No Known Problems Paternal Grandfather      Social History     Tobacco Use   • Smoking status: Never Smoker   • Smokeless tobacco: Never Used   Substance Use Topics   • Alcohol use:  Yes     Alcohol/week: 0 0 standard drinks     Comment: rarely     No Known Allergies      Current Outpatient Medications:   •  amphetamine-dextroamphetamine (ADDERALL XR, 20MG,) 20 MG 24 hr capsule, Take 1 capsule (20 mg total) by mouth every morning Max Daily Amount: 20 mg, Disp: 30 capsule, Rfl: 0  •  amphetamine-dextroamphetamine (ADDERALL, 10MG,) 10 mg tablet, Take 1 tablet (10 mg total) by mouth daily In afternoon as needed in addition to your XR 20 mg in AM Max Daily Amount: 10 mg, Disp: 30 tablet, Rfl: 0  •  calcium carbonate (OS-IVONE) 600 MG tablet, Take 600 mg by mouth 2 (two) times a day with meals, Disp: , Rfl:   •  Cholecalciferol (VITAMIN D) 2000 units CAPS, Take by mouth Take 5000IU daily, Disp: , Rfl:   •  Multiple Vitamin (MULTI-VITAMIN DAILY) TABS, Take by mouth, Disp: , Rfl:   •  sertraline (ZOLOFT) 50 mg tablet, Take 2 tablets (100 mg total) by mouth daily at bedtime, Disp: 180 tablet, Rfl: 0  •  calcium carbonate (TUMS) 500 mg chewable tablet, Chew 1 tablet (500 mg total) daily (Patient not taking: Reported on 11/14/2022), Disp: 180 tablet, Rfl: 0  •  cyclobenzaprine (FLEXERIL) 10 mg tablet, Take 1 tablet (10 mg total) by mouth 3 (three) times a day as needed for muscle spasms (Patient not taking: No sig reported), Disp: 30 tablet, Rfl: 0    Review of Systems   Constitutional: Positive for fatigue  HENT: Negative  Eyes: Negative  Respiratory: Negative  Cardiovascular: Negative  Gastrointestinal: Negative  Endocrine: Negative  Musculoskeletal: Negative  Skin: Negative  Allergic/Immunologic: Negative  Neurological: Negative  Hematological: Negative  Psychiatric/Behavioral: Negative  Physical Exam:  Body mass index is 27 93 kg/m²  /78   Pulse 70   Ht 5' (1 524 m)   Wt 64 9 kg (143 lb)   SpO2 90%   BMI 27 93 kg/m²    Vitals:    11/14/22 0816   Weight: 64 9 kg (143 lb)        Physical Exam  Constitutional:       Appearance: She is well-developed  HENT:      Head: Normocephalic  Eyes:      Pupils: Pupils are equal, round, and reactive to light  Neck:      Thyroid: No thyromegaly  Cardiovascular:      Rate and Rhythm: Normal rate  Heart sounds: Normal heart sounds  Pulmonary:      Effort: Pulmonary effort is normal       Breath sounds: Normal breath sounds     Abdominal:      General: Bowel sounds are normal  Palpations: Abdomen is soft  Musculoskeletal:         General: No deformity  Cervical back: Normal range of motion  Skin:     Capillary Refill: Capillary refill takes less than 2 seconds  Coloration: Skin is not pale  Findings: No rash  Neurological:      Mental Status: She is alert and oriented to person, place, and time  Labs:   Lab Results   Component Value Date    HGBA1C 5 0 03/23/2022       Lab Results   Component Value Date    RAP2TYQNBBBR 3 950 11/08/2022       Lab Results   Component Value Date    CREATININE 0 71 11/08/2022    CREATININE 0 84 07/21/2022    CREATININE 0 77 01/27/2022    BUN 18 11/08/2022    K 3 7 11/08/2022     11/08/2022    CO2 27 11/08/2022     eGFR   Date Value Ref Range Status   11/08/2022 90 ml/min/1 73sq m Final       Lab Results   Component Value Date    ALT 32 11/08/2022    AST 22 11/08/2022    ALKPHOS 112 11/08/2022       Lab Results   Component Value Date    CHOLESTEROL 220 (H) 06/07/2022    CHOLESTEROL 227 (H) 01/27/2022    CHOLESTEROL 203 (H) 01/18/2021     Lab Results   Component Value Date    HDL 74 06/07/2022    HDL 70 01/27/2022    HDL 69 01/18/2021     Lab Results   Component Value Date    TRIG 56 06/07/2022    TRIG 85 01/27/2022    TRIG 87 01/18/2021     Lab Results   Component Value Date    NONHDLC 146 06/07/2022    [place] 157 01/27/2022    [place] 134 01/18/2021         Impression:  1  Osteoporosis without current pathological fracture, unspecified osteoporosis type    2  Obstructive sleep apnea    3  Hyperparathyroidism ([address] )    4  Other specified intestinal malabsorption         Plan:    Diagnoses and all orders for this visit:    Osteoporosis without current pathological fracture, unspecified osteoporosis type  She has high fracture risk and will need medical therapy in spite of some improvement expected in concomitant osteomalacia with caocium and vitamin D replacement      High ALP, normal calcium and reduced PTH with vit D replacement suggest a component of osteomalacia  Today we agreed to continue calcium 1200mg daily, vitD3 5000IU daily and alendronate  Repeat DXA next year  Follow up post DXA and labs in 8 months  -     DXA bone density spine hip and pelvis; Future  -     Comprehensive metabolic panel; Future  -     Phosphorus; Future  -     Vitamin D 25 hydroxy; Future  -     PTH, intact; Future  -     alendronate (FOSAMAX) 70 mg tablet; Take once every week on empty stomach with water and stay upright for 1/2 hour after taking the medication    Obstructive sleep apnea    Hyperparathyroidism ([address] )  Possibly secondary but there is a 3mm lesion behind lt lower pole thyroid  Will monitor for PHPT in future  Other specified intestinal malabsorption  She has post RYGB malabsorption  Advised a multivitamin, vitamin C,D, calcium and iron supplementation  She needs yearly lab panel for vitamins and minerals  I have spent 35 minutes with patient today in which greater than 50% of this time was spent in counseling/coordination of care  Discussed with the patient and all questioned fully answered  She will call me if any problems arise  Educated/ Counseled patient on diagnostic test results, prognosis, risk vs benefit of treatment options, importance of treatment compliance, healthy life and lifestyle choices        [id number]

## 2022-11-17 DIAGNOSIS — F90.9 ATTENTION DEFICIT HYPERACTIVITY DISORDER (ADHD), UNSPECIFIED ADHD TYPE: ICD-10-CM

## 2022-11-17 DIAGNOSIS — F41.8 MIXED ANXIETY AND DEPRESSIVE DISORDER: ICD-10-CM

## 2022-11-17 RX ORDER — ALBUTEROL SULFATE 90 UG/1
AEROSOL, METERED RESPIRATORY (INHALATION)
Qty: 6.7 G | Refills: 0 | Status: SHIPPED | OUTPATIENT
Start: 2022-11-17

## 2022-11-17 RX ORDER — DEXTROAMPHETAMINE SACCHARATE, AMPHETAMINE ASPARTATE MONOHYDRATE, DEXTROAMPHETAMINE SULFATE AND AMPHETAMINE SULFATE 5; 5; 5; 5 MG/1; MG/1; MG/1; MG/1
20 CAPSULE, EXTENDED RELEASE ORAL EVERY MORNING
Qty: 90 CAPSULE | Refills: 0 | Status: SHIPPED | OUTPATIENT
Start: 2022-11-17

## 2022-11-17 RX ORDER — DEXTROAMPHETAMINE SACCHARATE, AMPHETAMINE ASPARTATE, DEXTROAMPHETAMINE SULFATE AND AMPHETAMINE SULFATE 2.5; 2.5; 2.5; 2.5 MG/1; MG/1; MG/1; MG/1
10 TABLET ORAL DAILY
Qty: 30 TABLET | Refills: 0 | Status: SHIPPED | OUTPATIENT
Start: 2022-11-17

## 2022-11-23 LAB — PROT SERPL-MCNC: 7.1 G/DL (ref 6.4–8.2)

## 2022-12-27 ENCOUNTER — HOSPITAL ENCOUNTER (OUTPATIENT)
Dept: RADIOLOGY | Age: 62
Discharge: HOME | End: 2022-12-27

## 2022-12-27 VITALS — BODY MASS INDEX: 28.07 KG/M2 | HEIGHT: 60 IN | WEIGHT: 143 LBS

## 2022-12-27 DIAGNOSIS — Z12.31 SCREENING MAMMOGRAM, ENCOUNTER FOR: ICD-10-CM

## 2023-01-03 ENCOUNTER — TELEPHONE (OUTPATIENT)
Dept: HEMATOLOGY ONCOLOGY | Facility: CLINIC | Age: 63
End: 2023-01-03

## 2023-01-03 ENCOUNTER — TELEPHONE (OUTPATIENT)
Dept: SURGICAL ONCOLOGY | Facility: CLINIC | Age: 63
End: 2023-01-03

## 2023-01-03 NOTE — TELEPHONE ENCOUNTER
Appointment Cancellation Or Reschedule     Person calling In self   If other than patient calling, are they listed on the communication consent form? self   Provider [facility] Visit Date and Time 1/5 3:30PM   Office Visit Location [facility]   Did patient want to reschedule their office appointment? If so, when was it scheduled to? Yes, 4/3 10Am   Did you have STAR scheduled for this appointment? no   Do you need STAR set up for your new appointment? If yes, please send to "PATIENT RIDESHARE" pool for STAR rescheduling no   If you are cancelling appointment, can we notify STAR to cancel ride? If yes, please send to "PATIENT RIDESHARE" pool for STAR to cancel service no   Is this patient calling to reschedule an infusion appointment? no   When is their next infusion appointment? no   Is this patient a Chemo patient? no   Reason for Cancellation or Reschedule Provider req     If the patient is a treatment patient, please route this to the office nurse  If the patient is not on treatment, please route to the office MA  If the patient is a surgical oncology patient, please route to surg/onc clinical pool

## 2023-01-18 ENCOUNTER — OFFICE VISIT (OUTPATIENT)
Dept: FAMILY MEDICINE CLINIC | Facility: CLINIC | Age: 63
End: 2023-01-18

## 2023-01-18 VITALS
WEIGHT: 148 LBS | OXYGEN SATURATION: 95 % | BODY MASS INDEX: 29.06 KG/M2 | SYSTOLIC BLOOD PRESSURE: 102 MMHG | DIASTOLIC BLOOD PRESSURE: 60 MMHG | HEIGHT: 60 IN | HEART RATE: 62 BPM | RESPIRATION RATE: 16 BRPM | TEMPERATURE: 97.9 F

## 2023-01-18 DIAGNOSIS — F41.8 MIXED ANXIETY AND DEPRESSIVE DISORDER: ICD-10-CM

## 2023-01-18 DIAGNOSIS — Z13.1 ENCOUNTER FOR SCREENING FOR DIABETES MELLITUS: ICD-10-CM

## 2023-01-18 DIAGNOSIS — Z98.84 STATUS POST GASTRIC BYPASS FOR OBESITY: ICD-10-CM

## 2023-01-18 DIAGNOSIS — R93.89 ABNORMAL ULTRASOUND OF PARATHYROID GLAND: ICD-10-CM

## 2023-01-18 DIAGNOSIS — Z13.220 ENCOUNTER FOR LIPID SCREENING FOR CARDIOVASCULAR DISEASE: ICD-10-CM

## 2023-01-18 DIAGNOSIS — F90.9 ATTENTION DEFICIT HYPERACTIVITY DISORDER (ADHD), UNSPECIFIED ADHD TYPE: Primary | ICD-10-CM

## 2023-01-18 DIAGNOSIS — G47.33 OBSTRUCTIVE SLEEP APNEA: ICD-10-CM

## 2023-01-18 DIAGNOSIS — Z13.6 ENCOUNTER FOR LIPID SCREENING FOR CARDIOVASCULAR DISEASE: ICD-10-CM

## 2023-01-18 DIAGNOSIS — J45.20 MILD INTERMITTENT ASTHMA WITHOUT COMPLICATION: ICD-10-CM

## 2023-01-18 PROBLEM — D49.7 PARATHYROID TUMOR: Status: ACTIVE | Noted: 2023-01-18

## 2023-01-18 RX ORDER — DEXTROAMPHETAMINE SACCHARATE, AMPHETAMINE ASPARTATE, DEXTROAMPHETAMINE SULFATE AND AMPHETAMINE SULFATE 2.5; 2.5; 2.5; 2.5 MG/1; MG/1; MG/1; MG/1
10 TABLET ORAL DAILY
Qty: 90 TABLET | Refills: 0 | Status: SHIPPED | OUTPATIENT
Start: 2023-01-18

## 2023-01-18 RX ORDER — DEXTROAMPHETAMINE SACCHARATE, AMPHETAMINE ASPARTATE MONOHYDRATE, DEXTROAMPHETAMINE SULFATE AND AMPHETAMINE SULFATE 5; 5; 5; 5 MG/1; MG/1; MG/1; MG/1
20 CAPSULE, EXTENDED RELEASE ORAL EVERY MORNING
Qty: 90 CAPSULE | Refills: 0 | Status: SHIPPED | OUTPATIENT
Start: 2023-01-18

## 2023-01-18 NOTE — PROGRESS NOTES
Subjective:      Patient ID: Santino Corona is a 60 y o  female  Here for follow up -  Hyperlipidemia: following dietary instructions, trying to lose weight,  Depression and anxiety- Stress level has been ok on zoloft 100 mg qd, mainly related to daughter and was understaffed at work  ADHD-   Pt takes adderall XR 20 mg qd and adderall 10 mg as needed once a day mainly in afternoon  FREIDA  Parathyroid lesion -3 mm, saw endocrine, monitoring PTH, VIT d and calcium  Osteoporosis- started on weekly alendronate , denies any side effects, taking calcium and vit d supplements  Recent fall- 10/12/2022, went to [facility] , was given muscle relaxer and never took it though, pain has resolved, suspects fracture of her ribs- but refused imaging  History of gastric bypass- taking multivitamin supplements   No cp/sob  No headaches  No GI upset or insomnia  No palpitations    Needs refills  No pain or new c/o         Past Medical History:   Diagnosis Date   • ADHD    • Anxiety    • Arthritis    • Asthma    • Breast cancer ([facility] ) 01/01/2012   • Cancer ([facility] ) 08/2012    DCIS-right breast   • Closed fracture of radial styloid    • CPAP (continuous positive airway pressure) dependence    • Endometriosis    • Family history of colon cancer in mother    • Forceps delivery     1991 daughter   • GERD (gastroesophageal reflux disease)    • H/O mitral valve prolapse     no regurgitation   • Hiatal hernia    • History of radiation therapy    • Hyperlipidemia    • Infertility, female    • Normal delivery     1998 daughter   • Sleep apnea     on cpap   • TB lung, latent     treated with INH (in her 29's)       Family History   Problem Relation Age of Onset   • Colon cancer Mother 48   • Cancer Mother         colon   • Osteoporosis Mother    • Stroke Father    • Hypertension Father    • Cancer Sister 70        spinal cancer   • Kidney cancer Sister 79   • No Known Problems Daughter    • No Known Problems Daughter    • No Known Problems Maternal Grandmother    • Colon cancer Maternal Grandfather [de-identified]   • Cancer Maternal Grandfather         colon   • Hypertension Paternal Grandmother    • Stroke Paternal Grandmother    • No Known Problems Paternal Grandfather    • No Known Problems Paternal Aunt    • Hypertension Family    • Mitral valve prolapse Family    • Osteoporosis Family    • Varicose Veins Family        Past Surgical History:   Procedure Laterality Date   • BREAST BIOPSY Right 04/01/2012    biopsy breast percutaneous needle core   • BREAST LUMPECTOMY Right 08/01/2012   • CHOLECYSTECTOMY  01/23/2013   • COLONOSCOPY     • DILATION AND CURETTAGE OF UTERUS     • ENDOMETRIAL BIOPSY      without cervical dilation   • KNEE ARTHROSCOPY      Plica syndrome   • LAPAROSCOPY      Exploratory 1990 & 1994   • SALUD-EN-Y PROCEDURE  01/23/2013    Dr Ann Marie Hill   • UPPER GASTROINTESTINAL ENDOSCOPY     • US GUIDANCE  5/14/2013        reports that she has never smoked  She has never used smokeless tobacco  She reports current alcohol use  She reports that she does not use drugs        Current Outpatient Medications:   •  alendronate (FOSAMAX) 70 mg tablet, Take once every week on empty stomach with water and stay upright for 1/2 hour after taking the medication, Disp: 12 tablet, Rfl: 3  •  amphetamine-dextroamphetamine (ADDERALL XR, 20MG,) 20 MG 24 hr capsule, Take 1 capsule (20 mg total) by mouth every morning Max Daily Amount: 20 mg, Disp: 90 capsule, Rfl: 0  •  amphetamine-dextroamphetamine (ADDERALL, 10MG,) 10 mg tablet, Take 1 tablet (10 mg total) by mouth daily In afternoon as needed in addition to your XR 20 mg in AM Max Daily Amount: 10 mg, Disp: 90 tablet, Rfl: 0  •  calcium carbonate (OS-IVONE) 600 MG tablet, Take 600 mg by mouth 2 (two) times a day with meals, Disp: , Rfl:   •  Cholecalciferol (VITAMIN D) 2000 units CAPS, Take by mouth Take 5000IU daily, Disp: , Rfl:   •  Multiple Vitamin (MULTI-VITAMIN DAILY) TABS, Take by mouth, Disp: , Rfl:   •  sertraline (ZOLOFT) 50 mg tablet, Take 2 tablets (100 mg total) by mouth daily at bedtime, Disp: 180 tablet, Rfl: 0  •  cyclobenzaprine (FLEXERIL) 10 mg tablet, Take 1 tablet (10 mg total) by mouth 3 (three) times a day as needed for muscle spasms (Patient not taking: No sig reported), Disp: 30 tablet, Rfl: 0    The following portions of the patient's history were reviewed and updated as appropriate: allergies, current medications, past family history, past medical history, past social history, past surgical history and problem list     Review of Systems   Constitutional: Negative for fatigue and fever  HENT: Negative for congestion, facial swelling, mouth sores, rhinorrhea, sore throat and trouble swallowing  Eyes: Negative for pain and redness  Respiratory: Negative for cough, shortness of breath and wheezing  Cardiovascular: Negative for chest pain, palpitations and leg swelling  Gastrointestinal: Negative for abdominal pain, blood in stool, constipation, diarrhea and nausea  Genitourinary: Negative for dysuria, hematuria and urgency  Musculoskeletal: Negative for arthralgias, back pain and myalgias  Skin: Negative for rash and wound  Neurological: Negative for seizures, syncope and headaches  Hematological: Negative for adenopathy  Psychiatric/Behavioral: Negative for agitation, behavioral problems and sleep disturbance  The patient is not nervous/anxious            PHQ-2/9 Depression Screening    Little interest or pleasure in doing things: 0 - not at all  Feeling down, depressed, or hopeless: 0 - not at all  Trouble falling or staying asleep, or sleeping too much: 0 - not at all  Feeling tired or having little energy: 0 - not at all  Poor appetite or overeatin - not at all  Feeling bad about yourself - or that you are a failure or have let yourself or your family down: 0 - not at all  Trouble concentrating on things, such as reading the newspaper or watching television: 0 - not at all  Moving or speaking so slowly that other people could have noticed  Or the opposite - being so fidgety or restless that you have been moving around a lot more than usual: 0 - not at all  Thoughts that you would be better off dead, or of hurting yourself in some way: 0 - not at all  PHQ-9 Score: 0   PHQ-9 Interpretation: No or Minimal depression              Objective:    /60 (BP Location: Left arm, Patient Position: Sitting, Cuff Size: Adult)   Pulse 62   Temp 97 9 °F (36 6 °C) (Tympanic)   Resp 16   Ht 5' (1 524 m)   Wt 67 1 kg (148 lb)   SpO2 95%   BMI 28 90 kg/m²      Physical Exam  Vitals and nursing note reviewed  Constitutional:       Appearance: Normal appearance  She is well-developed  She is not ill-appearing  HENT:      Head: Normocephalic and atraumatic  Right Ear: External ear normal       Left Ear: External ear normal       Nose: Nose normal       Mouth/Throat:      Mouth: Mucous membranes are moist       Pharynx: No oropharyngeal exudate or posterior oropharyngeal erythema  Eyes:      General: No scleral icterus  Right eye: No discharge  Left eye: No discharge  Conjunctiva/sclera: Conjunctivae normal    Cardiovascular:      Rate and Rhythm: Normal rate  Heart sounds: No murmur heard  No gallop  Pulmonary:      Effort: Pulmonary effort is normal  No respiratory distress  Breath sounds: Normal breath sounds  No stridor  No wheezing, rhonchi or rales  Abdominal:      Palpations: Abdomen is soft  Tenderness: There is no abdominal tenderness  Musculoskeletal:         General: No tenderness or deformity  Skin:     Findings: No erythema or rash  Neurological:      Mental Status: She is alert  Mental status is at baseline     Psychiatric:         Behavior: Behavior normal          Judgment: Judgment normal            Recent Results (from the past 8736 hour(s))   CBC and differential    Collection Time: 01/27/22 11:11 AM   Result Value Ref Range WBC 5 88 4 31 - 10 16 Thousand/uL    RBC 4 46 3 81 - 5 12 Million/uL    Hemoglobin 13 5 11 5 - 15 4 g/dL    Hematocrit 41 7 34 8 - 46 1 %    MCV 94 82 - 98 fL    MCH 30 3 26 8 - 34 3 pg    MCHC 32 4 31 4 - 37 4 g/dL    RDW 12 3 11 6 - 15 1 %    MPV 10 0 8 9 - 12 7 fL    Platelets 426 223 - 212 Thousands/uL    nRBC 0 /100 WBCs    Neutrophils Relative 56 43 - 75 %    Immat GRANS % 0 0 - 2 %    Lymphocytes Relative 34 14 - 44 %    Monocytes Relative 8 4 - 12 %    Eosinophils Relative 1 0 - 6 %    Basophils Relative 1 0 - 1 %    Neutrophils Absolute 3 31 1 85 - 7 62 Thousands/µL    Immature Grans Absolute 0 00 0 00 - 0 20 Thousand/uL    Lymphocytes Absolute 2 01 0 60 - 4 47 Thousands/µL    Monocytes Absolute 0 48 0 17 - 1 22 Thousand/µL    Eosinophils Absolute 0 04 0 00 - 0 61 Thousand/µL    Basophils Absolute 0 04 0 00 - 0 10 Thousands/µL   Comprehensive metabolic panel    Collection Time: 01/27/22 11:11 AM   Result Value Ref Range    Sodium 136 136 - 145 mmol/L    Potassium 3 7 3 5 - 5 3 mmol/L    Chloride 104 100 - 108 mmol/L    CO2 30 21 - 32 mmol/L    ANION GAP 2 (L) 4 - 13 mmol/L    BUN 16 5 - 25 mg/dL    Creatinine 0 77 0 60 - 1 30 mg/dL    Glucose, Fasting 80 65 - 99 mg/dL    Calcium 11 1 (H) 8 3 - 10 1 mg/dL    AST 22 5 - 45 U/L    ALT 29 12 - 78 U/L    Alkaline Phosphatase 95 46 - 116 U/L    Total Protein 7 4 6 4 - 8 2 g/dL    Albumin 4 3 3 5 - 5 0 g/dL    Total Bilirubin 1 48 (H) 0 20 - 1 00 mg/dL    eGFR 83 ml/min/1 73sq m   Lipid panel    Collection Time: 01/27/22 11:11 AM   Result Value Ref Range    Cholesterol 227 (H) See Comment mg/dL    Triglycerides 85 See Comment mg/dL    HDL, Direct 70 >=50 mg/dL    LDL Calculated 140 (H) 0 - 100 mg/dL    Non-HDL-Chol (CHOL-HDL) 157 mg/dl   TSH, 3rd generation with Free T4 reflex    Collection Time: 01/27/22 11:11 AM   Result Value Ref Range    TSH 3RD GENERATON 4 540 (H) 0 358 - 3 740 uIU/mL   T4, free    Collection Time: 01/27/22 11:11 AM   Result Value Ref Range Free T4 0 90 0 76 - 1 46 ng/dL   TSH, 3rd generation    Collection Time: 02/07/22  7:47 AM   Result Value Ref Range    TSH 3RD GENERATON 4 670 (H) 0 358 - 3 740 uIU/mL   T3, free    Collection Time: 02/07/22  7:47 AM   Result Value Ref Range    T3, Free 2 39 2 30 - 4 20 pg/mL   T4, free    Collection Time: 02/07/22  7:47 AM   Result Value Ref Range    Free T4 0 83 0 76 - 1 46 ng/dL   Hemoglobin A1C    Collection Time: 03/23/22  7:58 AM   Result Value Ref Range    Hemoglobin A1C 5 0 Normal 3 8-5 6%; PreDiabetic 5 7-6 4%; Diabetic >=6 5%; Glycemic control for adults with diabetes <7 0% %    EAG 97 mg/dl   Tissue Exam    Collection Time: 03/25/22 12:51 PM   Result Value Ref Range    Case Report       Surgical Pathology Report                         Case: [id number]                                   Authorizing Provider:  Shanelle Oakley MD          Collected:           03/25/2022 Roxanne Pulido [address]              Ordering Location:     [facility]   Received:            03/25/2022 [facility] [place] [address] [place]                                                                       Pathologist:           Jemima Avila MD                                                                  Specimen:    Esophagus, Esophageal bx                                                                   Final Diagnosis       A  Esophagus, Biopsy:  - Benign squamous mucosa without specific histopathologic abnormality   - Eosinophils are fewer than 3 cells per high power field in squamous epithelium   - Negative for dysplasia  Additional Information       All reported additional testing was performed with appropriately reactive controls    These tests were developed and their performance characteristics determined by [facility] or appropriate performing facility, though some tests may be performed on tissues which have not been validated for performance characteristics (such as staining performed on alcohol exposed cell blocks and decalcified tissues)  Results should be interpreted with caution and in the context of the patients’ clinical condition  These tests may not be cleared or approved by the [place]  Food and Drug Administration, though the FDA has determined that such clearance or approval is not necessary  These tests are used for clinical purposes and they should not be regarded as investigational or for research  This laboratory has been approved by [place] [id number], designated as a high-complexity laboratory and is qualified to perform these tests  Interpretation performed at [facility], [address] [place] [address]      Gross Description          A  The specimen is received in formalin, labeled with the patient's name and hospital number, and is designated "esophageal biopsy”  The specimen consists of multiple white soft tissue fragments measuring in loose aggregate 1 0 x 0 4 x 0 1 cm  Due to the size and consistency of the specimen it is questionable whether it will survive processing  Entirely submitted  One screened cassette  Note: The estimated total formalin fixation time based upon information provided by the submitting clinician and the standard processing schedule is under 72 hours    Sharri       Lipid panel    Collection Time: 06/07/22 12:00 AM   Result Value Ref Range    Cholesterol 220 (H) See Comment mg/dL    Triglycerides 56 See Comment mg/dL    HDL, Direct 74 >=50 mg/dL    LDL Calculated 135 (H) 0 - 100 mg/dL    Non-HDL-Chol (CHOL-HDL) 146 mg/dl   Hepatic function panel    Collection Time: 06/07/22 12:00 AM   Result Value Ref Range    Total Bilirubin 0 74 0 20 - 1 00 mg/dL    Bilirubin, Direct 0 16 0 00 - 0 20 mg/dL    Alkaline Phosphatase 84 46 - 116 U/L    AST 25 5 - 45 U/L    ALT 36 12 - 78 U/L    Total Protein 7 1 6 4 - 8 2 g/dL    Albumin 4 0 3 5 - 5 0 g/dL   TSH, 3rd generation    Collection Time: 06/07/22 12:00 AM   Result Value Ref Range    TSH 3RD GENERATON 4 160 0 450 - 4 500 uIU/mL   T4, free    Collection Time: 06/07/22 12:00 AM   Result Value Ref Range    Free T4 0 87 0 76 - 1 46 ng/dL   PTH, intact    Collection Time: 07/21/22  8:46 AM   Result Value Ref Range    PTH 95 3 (H) 18 4 - 80 1 pg/mL   Vitamin D 25 hydroxy    Collection Time: 07/21/22  8:46 AM   Result Value Ref Range    Vit D, 25-Hydroxy 28 2 (L) 30 0 - 100 0 ng/mL   Comprehensive metabolic panel    Collection Time: 07/21/22  8:46 AM   Result Value Ref Range    Sodium 139 135 - 147 mmol/L    Potassium 3 9 3 5 - 5 3 mmol/L    Chloride 105 96 - 108 mmol/L    CO2 28 21 - 32 mmol/L    ANION GAP 6 4 - 13 mmol/L    BUN 24 5 - 25 mg/dL    Creatinine 0 84 0 60 - 1 30 mg/dL    Glucose, Fasting 91 65 - 99 mg/dL    Calcium 9 1 8 3 - 10 1 mg/dL    AST 19 5 - 45 U/L    ALT 25 12 - 78 U/L    Alkaline Phosphatase 89 46 - 116 U/L    Total Protein 7 5 6 4 - 8 4 g/dL    Albumin 3 8 3 5 - 5 0 g/dL    Total Bilirubin 0 59 0 20 - 1 00 mg/dL    eGFR 74 ml/min/1 73sq m   Calcium, ionized    Collection Time: 07/21/22  8:46 AM   Result Value Ref Range    Calcium, Ionized 1 20 1  12 - 1 32 mmol/L   Protein electrophoresis, urine    Collection Time: 11/08/22 11:08 AM   Result Value Ref Range    Urine Protein 10 0 2 0 - 17 5 mg/dL    Albumin ELP, Urine 100 0 %    Alpha 1, Urine 0 0 %    Alpha 2, Urine 0 0 %    Beta, Urine 0 0 %    Gamma Globulin, Urine 0 0 %    UPEP Interp See Comment    NTX Panel, Urine    Collection Time: 11/08/22 11:08 AM   Result Value Ref Range    N-TELEO URINE 822 Not Estab  nmol BCE    Creatinine, Urine 133 5 Not Estab  mg/dL    N-telo/Creat   Ratio 70 0 - 89 nM BCE/mM Cr    Interpretive Guide: Comment    Comprehensive metabolic panel    Collection Time: 11/08/22 11:08 AM   Result Value Ref Range    Sodium 139 135 - 147 mmol/L    Potassium 3 7 3 5 - 5 3 mmol/L    Chloride 106 96 - 108 mmol/L    CO2 27 21 - 32 mmol/L    ANION GAP 6 4 - 13 mmol/L    BUN 18 5 - 25 mg/dL    Creatinine 0 71 0 60 - 1 30 mg/dL    Glucose, Fasting 83 65 - 99 mg/dL    Calcium 9 3 8 3 - 10 1 mg/dL    AST 22 5 - 45 U/L    ALT 32 12 - 78 U/L    Alkaline Phosphatase 112 46 - 116 U/L    Total Protein 7 6 6 4 - 8 4 g/dL    Albumin 3 9 3 5 - 5 0 g/dL    Total Bilirubin 0 72 0 20 - 1 00 mg/dL    eGFR 90 ml/min/1 73sq m   Phosphorus    Collection Time: 11/08/22 11:08 AM   Result Value Ref Range    Phosphorus 3 4 2 3 - 4 1 mg/dL   PTH, intact    Collection Time: 11/08/22 11:08 AM   Result Value Ref Range    PTH 63 7 18 4 - 80 1 pg/mL   Protein electrophoresis, serum    Collection Time: 11/08/22 11:08 AM   Result Value Ref Range    A/G Ratio      Total Protein 7 1 6 4 - 8 2 g/dL    SPEP Interpretation     C-Telopeptide    Collection Time: 11/08/22 11:08 AM   Result Value Ref Range    C-TELOPEPTIDE,SERUM 531 pg/mL   TSH, 3rd generation    Collection Time: 11/08/22 11:08 AM   Result Value Ref Range    TSH 3RD GENERATON 3 950 0 450 - 4 500 uIU/mL   T4, free    Collection Time: 11/08/22 11:08 AM   Result Value Ref Range    Free T4 0 80 0 76 - 1 46 ng/dL   Vitamin D 25 hydroxy    Collection Time: 11/08/22 11:08 AM   Result Value Ref Range    Vit D, 25-Hydroxy 61 9 30 0 - 100 0 ng/mL       Laboratory Results: I have personally reviewed the pertinent laboratory results/reports     Radiology/Other Diagnostic Testing Results: I have personally reviewed pertinent reports  Mammo screening bilateral w 3d & cad    Result Date: 12/31/2022  DIAGNOSIS: Screening mammogram, encounter for TECHNIQUE: Digital screening mammography was performed  Computer Aided Detection (CAD) analyzed all applicable images  COMPARISONS: Multiple prior exams dated between 4/28/2009 and 12/23/2021  RELEVANT HISTORY: Family Breast Cancer History: No known family history of breast cancer  Family Medical History: Family medical history includes colon cancer in 2 relatives (maternal grandfather, mother)  Personal History: Hormone history includes birth control   Surgical history includes breast biopsy and lumpectomy  Medical history includes breast cancer  The patient is scheduled in a reminder system for screening mammography  8-10% of cancers will be missed on mammography  Management of a palpable abnormality must be based on clinical grounds  Patients will be notified of their results via letter from our facility  Accredited by [employer] of Radiology and FDA  RISK ASSESSMENT: Surya risk assessment reporting was suppressed due to the patient's history and/or demographic factors  TISSUE DENSITY: The breasts are heterogeneously dense, which may obscure small masses  INDICATION: Dahiana Persaud is a 60 y o  female presenting for screening mammography  FINDINGS: Bilateral There are no suspicious masses, grouped microcalcifications or areas of unexplained architectural distortion  The skin and nipple areolar complex are unremarkable  Postsurgical changes are noted in the right breast   Benign-appearing calcifications are noted in each breast      No mammographic evidence of malignancy  ASSESSMENT/BI-RADS CATEGORY: Left: 2 - Benign Right: 2 - Benign Overall: 2 - Benign RECOMMENDATION:      - Routine screening mammogram in 1 year for both breasts   Workstation ID: [id number]        Assessment/Plan:  Problem List Items Addressed This Visit        Respiratory    Obstructive sleep apnea       Other    Status post gastric bypass for obesity    Mixed anxiety and depressive disorder    Relevant Medications    amphetamine-dextroamphetamine (ADDERALL, 10MG,) 10 mg tablet    amphetamine-dextroamphetamine (ADDERALL XR, 20MG,) 20 MG 24 hr capsule    sertraline (ZOLOFT) 50 mg tablet    Other Relevant Orders    CBC and differential    ADHD - Primary    Relevant Medications    amphetamine-dextroamphetamine (ADDERALL, 10MG,) 10 mg tablet    amphetamine-dextroamphetamine (ADDERALL XR, 20MG,) 20 MG 24 hr capsule    sertraline (ZOLOFT) 50 mg tablet   Other Visit Diagnoses     Encounter for lipid screening for cardiovascular disease        Relevant Orders    Lipid panel    Encounter for screening for diabetes mellitus        Relevant Orders    Hemoglobin A1C    Mild intermittent asthma without complication        Relevant Orders    CBC and differential    Abnormal ultrasound of parathyroid gland            Labs prior to next appointment  Continue alendronate- though she would be a better candidate for Evenity and prolia  Managed by Endocrine  Continue Adderral xl and adderral 10 mg as needed  Continue management of parathyroid lesion primarily per endocrine  Continue multivitamins, calcium and vit d  F/u in 4 months          BMI Counseling: Body mass index is 28 9 kg/m²  The BMI is above normal  Nutrition recommendations include decreasing portion sizes, encouraging healthy choices of fruits and vegetables, decreasing fast food intake, consuming healthier snacks, limiting drinks that contain sugar, moderation in carbohydrate intake and reducing intake of cholesterol  Exercise recommendations include moderate physical activity 150 minutes/week  No pharmacotherapy was ordered  Rationale for BMI follow-up plan is due to patient being overweight or obese  Read package inserts for all medications before starting a new medications, call me if you have any questions  Patient was given opportunity to ask questions and all questions were answered  Disclaimer: Portions of the record may have been created with voice recognition software  Occasional wrong word or "sound a like" substitutions may have occurred due to the inherent limitations of voice recognition software  Read the chart carefully and recognize, using context, where substitutions have occurred  I have used the Epic copy/forward function to compose this note  I have reviewed my current note to ensure it reflects the current patient status, exam, assessment and plan

## 2023-03-03 ENCOUNTER — TELEPHONE (OUTPATIENT)
Dept: SURGICAL ONCOLOGY | Facility: CLINIC | Age: 63
End: 2023-03-03

## 2023-03-03 NOTE — TELEPHONE ENCOUNTER
Called patient to reschedule her follow up appointment with Dr Dawna Chávez on 4/3 due to him being in the OR that morning  There was no answer; message was left with direct call back number provided

## 2023-03-07 NOTE — TELEPHONE ENCOUNTER
Called the patient again to reschedule her follow up appointment on 4/3 but there was no answer  Message was left with a direct call back number provided

## 2023-03-08 ENCOUNTER — OFFICE VISIT (OUTPATIENT)
Dept: URGENT CARE | Age: 63
End: 2023-03-08

## 2023-03-08 VITALS
TEMPERATURE: 97.7 F | OXYGEN SATURATION: 98 % | HEIGHT: 60 IN | SYSTOLIC BLOOD PRESSURE: 145 MMHG | RESPIRATION RATE: 16 BRPM | HEART RATE: 65 BPM | BODY MASS INDEX: 29.06 KG/M2 | DIASTOLIC BLOOD PRESSURE: 80 MMHG | WEIGHT: 148 LBS

## 2023-03-08 DIAGNOSIS — H10.9 CONJUNCTIVITIS OF RIGHT EYE, UNSPECIFIED CONJUNCTIVITIS TYPE: Primary | ICD-10-CM

## 2023-03-08 RX ORDER — OFLOXACIN 3 MG/ML
1 SOLUTION/ DROPS OPHTHALMIC 4 TIMES DAILY
Qty: 5 ML | Refills: 0 | Status: SHIPPED | OUTPATIENT
Start: 2023-03-08

## 2023-03-08 NOTE — PROGRESS NOTES
[facility]        NAME: Juanpablo Trent is a 60 y o  female  : 1960    MRN: [id number]  DATE: 2023  TIME: 2:04 PM    Assessment and Plan   Conjunctivitis of right eye, unspecified conjunctivitis type [H10 9]  1  Conjunctivitis of right eye, unspecified conjunctivitis type  ofloxacin (OCUFLOX) 0 3 % ophthalmic solution            Patient Instructions       Follow up with PCP in 3-5 days  Proceed to  ER if symptoms worsen  Chief Complaint     Chief Complaint   Patient presents with   • Eye Problem     Two weeks ago patient started with scratchy, itchy eyes  States she has discharge in her eye, believes it may be from her contacts  She was taking allergy medication but it did not help  History of Present Illness       HPI  Patient presents today complaining of itchy scratchy eyes as well as discharge from her right eye ongoing for the past many days  Patient thinks it may be infected from one of her contacts  Patient is taking allergy medication but did not help  Patient denies any loss of vision or visual disturbances    Review of Systems   Review of Systems  Per hpi     Current Medications       Current Outpatient Medications:   •  alendronate (FOSAMAX) 70 mg tablet, Take once every week on empty stomach with water and stay upright for 1/2 hour after taking the medication, Disp: 12 tablet, Rfl: 3  •  amphetamine-dextroamphetamine (ADDERALL XR, 20MG,) 20 MG 24 hr capsule, Take 1 capsule (20 mg total) by mouth every morning Max Daily Amount: 20 mg, Disp: 90 capsule, Rfl: 0  •  amphetamine-dextroamphetamine (ADDERALL, 10MG,) 10 mg tablet, Take 1 tablet (10 mg total) by mouth daily In afternoon as needed in addition to your XR 20 mg in AM Max Daily Amount: 10 mg, Disp: 90 tablet, Rfl: 0  •  calcium carbonate (OS-IVONE) 600 MG tablet, Take 600 mg by mouth 2 (two) times a day with meals, Disp: , Rfl:   •  Cholecalciferol (VITAMIN D) 2000 units CAPS, Take by mouth Take 5000IU daily, Disp: , Rfl:   •  Multiple Vitamin (MULTI-VITAMIN DAILY) TABS, Take by mouth, Disp: , Rfl:   •  ofloxacin (OCUFLOX) 0 3 % ophthalmic solution, Administer 1 drop to both eyes 4 (four) times a day, Disp: 5 mL, Rfl: 0  •  sertraline (ZOLOFT) 50 mg tablet, Take 2 tablets (100 mg total) by mouth daily at bedtime, Disp: 180 tablet, Rfl: 0  •  cyclobenzaprine (FLEXERIL) 10 mg tablet, Take 1 tablet (10 mg total) by mouth 3 (three) times a day as needed for muscle spasms (Patient not taking: Reported on 10/14/2022), Disp: 30 tablet, Rfl: 0    Current Allergies     Allergies as of 03/08/2023   • (No Known Allergies)            The following portions of the patient's history were reviewed and updated as appropriate: allergies, current medications, past family history, past medical history, past social history, past surgical history and problem list      Past Medical History:   Diagnosis Date   • ADHD    • Anxiety    • Arthritis    • Asthma    • Breast cancer ([facility] ) 01/01/2012   • Cancer ([facility] ) 08/2012    DCIS-right breast   • Closed fracture of radial styloid    • CPAP (continuous positive airway pressure) dependence    • Endometriosis    • Family history of colon cancer in mother    • Forceps delivery     1991 daughter   • GERD (gastroesophageal reflux disease)    • H/O mitral valve prolapse     no regurgitation   • Hiatal hernia    • History of radiation therapy    • Hyperlipidemia    • Infertility, female    • Normal delivery     1998 daughter   • Sleep apnea     on cpap   • TB lung, latent     treated with INH (in her 29's)       Past Surgical History:   Procedure Laterality Date   • BREAST BIOPSY Right 04/01/2012    biopsy breast percutaneous needle core   • BREAST LUMPECTOMY Right 08/01/2012   • CHOLECYSTECTOMY  01/23/2013   • COLONOSCOPY     • DILATION AND CURETTAGE OF UTERUS     • ENDOMETRIAL BIOPSY      without cervical dilation   • KNEE ARTHROSCOPY      Plica syndrome   • LAPAROSCOPY      Exploratory 1990 & 1994   • SALUD-EN-Y PROCEDURE  01/23/2013    Dr Swapna Cortes   • UPPER GASTROINTESTINAL ENDOSCOPY     • US GUIDANCE  5/14/2013       Family History   Problem Relation Age of Onset   • Colon cancer Mother 48   • Cancer Mother         colon   • Osteoporosis Mother    • Stroke Father    • Hypertension Father    • Cancer Sister 70        spinal cancer   • Kidney cancer Sister 79   • No Known Problems Daughter    • No Known Problems Daughter    • No Known Problems Maternal Grandmother    • Colon cancer Maternal Grandfather [de-identified]   • Cancer Maternal Grandfather         colon   • Hypertension Paternal Grandmother    • Stroke Paternal Grandmother    • No Known Problems Paternal Grandfather    • No Known Problems Paternal Aunt    • Hypertension Family    • Mitral valve prolapse Family    • Osteoporosis Family    • Varicose Veins Family          Medications have been verified  Objective   /80   Pulse 65   Temp 97 7 °F (36 5 °C)   Resp 16   Ht 5' (1 524 m)   Wt 67 1 kg (148 lb)   SpO2 98%   BMI 28 90 kg/m²   No LMP recorded  Patient is postmenopausal        Physical Exam     Physical Exam  Constitutional:       Appearance: She is well-developed  HENT:      Head: Normocephalic and atraumatic  Eyes:      General:         Right eye: Discharge present  Left eye: Discharge present  Comments: bilat conj injection    Pulmonary:      Effort: Pulmonary effort is normal  No respiratory distress  Abdominal:      Palpations: Abdomen is soft  Tenderness: There is no abdominal tenderness  Musculoskeletal:      Cervical back: Normal range of motion  Skin:     Capillary Refill: Capillary refill takes less than 2 seconds  Findings: No erythema  Neurological:      Mental Status: She is alert and oriented to person, place, and time

## 2023-04-04 ENCOUNTER — APPOINTMENT (OUTPATIENT)
Dept: LAB | Age: 63
End: 2023-04-04

## 2023-04-04 DIAGNOSIS — Z00.8 HEALTH EXAMINATION IN POPULATION SURVEY: ICD-10-CM

## 2023-04-04 LAB
CHOLEST SERPL-MCNC: 211 MG/DL
HDLC SERPL-MCNC: 60 MG/DL
LDLC SERPL CALC-MCNC: 129 MG/DL (ref 0–100)
NONHDLC SERPL-MCNC: 151 MG/DL
TRIGL SERPL-MCNC: 112 MG/DL

## 2023-04-05 LAB
EST. AVERAGE GLUCOSE BLD GHB EST-MCNC: 103 MG/DL
HBA1C MFR BLD: 5.2 %

## 2023-04-06 ENCOUNTER — OFFICE VISIT (OUTPATIENT)
Dept: SURGICAL ONCOLOGY | Facility: CLINIC | Age: 63
End: 2023-04-06

## 2023-04-06 VITALS
TEMPERATURE: 98 F | SYSTOLIC BLOOD PRESSURE: 108 MMHG | DIASTOLIC BLOOD PRESSURE: 68 MMHG | RESPIRATION RATE: 12 BRPM | HEIGHT: 60 IN | BODY MASS INDEX: 28.66 KG/M2 | OXYGEN SATURATION: 97 % | HEART RATE: 76 BPM | WEIGHT: 146 LBS

## 2023-04-06 DIAGNOSIS — Z86.000 HISTORY OF DUCTAL CARCINOMA IN SITU (DCIS) OF BREAST: Primary | ICD-10-CM

## 2023-04-06 DIAGNOSIS — Z86.000 ENCOUNTER FOR FOLLOW-UP SURVEILLANCE OF DUCTAL CARCINOMA IN SITU OF BREAST: ICD-10-CM

## 2023-04-06 DIAGNOSIS — Z08 ENCOUNTER FOR FOLLOW-UP SURVEILLANCE OF DUCTAL CARCINOMA IN SITU OF BREAST: ICD-10-CM

## 2023-04-06 NOTE — PROGRESS NOTES
Surgical Oncology Follow Up       [address] [facility] [facility]  [facility]  [address] [id number]    Peg Ser  1960  [de-identified]  [address] [facility] [facility]  [facility]  [address] [id number]    Chief Complaint   Patient presents with   • Follow-up          Assessment & Plan:   Patient presents for a 1 year follow-up visit  She has no complaints referable to her breast   She is due for mammograms in December which are already coordinated  Her clinical exam shows no evidence of local regional or distant recurrent disease  She is now 10 years out we will see her back on as-needed basis  She will follow-up with her PCP  Cancer History:     Oncology History   History of ductal carcinoma in situ (DCIS) of breast   5/14/2013 Initial Diagnosis    Biopsy at [facility]  Right Atypical ductal hyperplasia     6/25/2013 Surgery    Right needle localization/lumpectomy  DCIS  Grade 1      Stage 0     7/2013 -  Hormone Therapy    Med onc consult  No systemic therapy recommended     9/6/2013 - 10/23/2013 Radiation    Whole breast Radiation therapy           Interval History:   See above, the patient has no complaints referable to her breast   Her last mammogram demonstrated no worrisome findings  Review of Systems:   Review of Systems   All other systems reviewed and are negative        Past Medical History     Patient Active Problem List   Diagnosis   • History of ductal carcinoma in situ (DCIS) of breast   • Hypercholesterolemia   • Obstructive sleep apnea   • Status post gastric bypass for obesity   • Mixed anxiety and depressive disorder   • Premature menopause   • ADHD   • Family history of colon cancer   • Dysphagia   • Elevated bilirubin   • Osteoporosis without current pathological fracture     Past Medical History:   Diagnosis Date   • ADHD    • Anxiety    • Arthritis    • Asthma • Breast cancer ([facility] ) 01/01/2012   • Cancer ([facility] ) 08/2012    DCIS-right breast   • Closed fracture of radial styloid    • CPAP (continuous positive airway pressure) dependence    • Endometriosis    • Family history of colon cancer in mother    • Forceps delivery     1991 daughter   • GERD (gastroesophageal reflux disease)    • H/O mitral valve prolapse     no regurgitation   • Hiatal hernia    • History of radiation therapy    • Hyperlipidemia    • Infertility, female    • Normal delivery     1998 daughter   • Sleep apnea     on cpap   • TB lung, latent     treated with INH (in her 29's)     Past Surgical History:   Procedure Laterality Date   • BREAST BIOPSY Right 04/01/2012    biopsy breast percutaneous needle core   • BREAST LUMPECTOMY Right 08/01/2012   • CHOLECYSTECTOMY  01/23/2013   • COLONOSCOPY     • DILATION AND CURETTAGE OF UTERUS     • ENDOMETRIAL BIOPSY      without cervical dilation   • KNEE ARTHROSCOPY      Plica syndrome   • LAPAROSCOPY      Exploratory 1990 & 1994   • SALUD-EN-Y PROCEDURE  01/23/2013    Dr Samaria Saucedo   • UPPER GASTROINTESTINAL ENDOSCOPY     • US GUIDANCE  5/14/2013     Family History   Problem Relation Age of Onset   • Colon cancer Mother 48   • Cancer Mother         colon   • Osteoporosis Mother    • Stroke Father    • Hypertension Father    • Cancer Sister 70        spinal cancer   • Kidney cancer Sister 79   • No Known Problems Daughter    • No Known Problems Daughter    • No Known Problems Maternal Grandmother    • Colon cancer Maternal Grandfather [de-identified]   • Cancer Maternal Grandfather         colon   • Hypertension Paternal Grandmother    • Stroke Paternal Grandmother    • No Known Problems Paternal Grandfather    • No Known Problems Paternal Aunt    • Hypertension Family    • Mitral valve prolapse Family    • Osteoporosis Family    • Varicose Veins Family      Social History     Socioeconomic History   • Marital status:      Spouse name: Not on file   • Number of children: 2 • Years of education: Not on file   • Highest education level: Not on file   Occupational History   • Not on file   Tobacco Use   • Smoking status: Never   • Smokeless tobacco: Never   Vaping Use   • Vaping Use: Never used   Substance and Sexual Activity   • Alcohol use:  Yes     Alcohol/week: 0 0 standard drinks     Comment: rarely   • Drug use: Never   • Sexual activity: Yes     Partners: Male     Birth control/protection: Post-menopausal   Other Topics Concern   • Not on file   Social History Narrative        2 children    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, ceramics     Social Determinants of Health     Financial Resource Strain: Not on file   Food Insecurity: Not on file   Transportation Needs: Not on file   Physical Activity: Not on file   Stress: Not on file   Social Connections: Not on file   Intimate Partner Violence: Not on file   Housing Stability: Not on file       Current Outpatient Medications:   •  alendronate (FOSAMAX) 70 mg tablet, Take once every week on empty stomach with water and stay upright for 1/2 hour after taking the medication, Disp: 12 tablet, Rfl: 3  •  amphetamine-dextroamphetamine (ADDERALL XR, 20MG,) 20 MG 24 hr capsule, Take 1 capsule (20 mg total) by mouth every morning Max Daily Amount: 20 mg, Disp: 90 capsule, Rfl: 0  •  amphetamine-dextroamphetamine (ADDERALL, 10MG,) 10 mg tablet, Take 1 tablet (10 mg total) by mouth daily In afternoon as needed in addition to your XR 20 mg in AM Max Daily Amount: 10 mg, Disp: 90 tablet, Rfl: 0  •  calcium carbonate (OS-IVONE) 600 MG tablet, Take 600 mg by mouth 2 (two) times a day with meals, Disp: , Rfl:   •  Cholecalciferol (VITAMIN D) 2000 units CAPS, Take by mouth Take 5000IU daily, Disp: , Rfl:   •  Multiple Vitamin (MULTI-VITAMIN DAILY) TABS, Take by mouth, Disp: , Rfl:   •  sertraline (ZOLOFT) 50 mg tablet, Take 2 tablets (100 mg total) by mouth daily at bedtime, Disp: 180 tablet, Rfl: 0  •  cyclobenzaprine (FLEXERIL) 10 mg tablet, Take 1 tablet (10 mg total) by mouth 3 (three) times a day as needed for muscle spasms (Patient not taking: Reported on 10/14/2022), Disp: 30 tablet, Rfl: 0  •  ofloxacin (OCUFLOX) 0 3 % ophthalmic solution, Administer 1 drop to both eyes 4 (four) times a day (Patient not taking: Reported on 4/6/2023), Disp: 5 mL, Rfl: 0  No Known Allergies    Physical Exam:     Vitals:    04/06/23 1430   BP: 108/68   Pulse: 76   Resp: 12   Temp: 98 °F (36 7 °C)   SpO2: 97%     Physical Exam  Vitals reviewed  Constitutional:       Appearance: She is well-developed  HENT:      Head: Normocephalic and atraumatic  Eyes:      Pupils: Pupils are equal, round, and reactive to light  Neck:      Thyroid: No thyromegaly  Vascular: No JVD  Trachea: No tracheal deviation  Cardiovascular:      Rate and Rhythm: Normal rate and regular rhythm  Heart sounds: Normal heart sounds  No murmur heard  No friction rub  No gallop  Pulmonary:      Effort: Pulmonary effort is normal  No respiratory distress  Breath sounds: Normal breath sounds  No wheezing or rales  Chest:          Comments: Examination of the right breast in both the sitting and supine position demonstrate no worrisome skin findings though she does have some significant indentation over the scar which is stable  There is no nipple discharge  There are no dominant masses no axillary adenopathy  The left breast was examined in the sitting and supine position  There are no worrisome skin changes, tenderness, inverted nipple, nipple discharge, swelling, bleeding or evidence of a mass in any quadrant  Brian survey demonstrated no evidence of any clinically suspicious axillary, pectoral or paraclavicular lymph nodes  Abdominal:      General: There is no distension  Palpations: Abdomen is soft  There is no hepatomegaly or mass  Tenderness: There is no abdominal tenderness  There is no guarding or rebound  Musculoskeletal:         General: No tenderness  Normal range of motion  Cervical back: Normal range of motion and neck supple  Lymphadenopathy:      Cervical: No cervical adenopathy  Skin:     General: Skin is warm and dry  Findings: No erythema or rash  Neurological:      Mental Status: She is alert and oriented to person, place, and time  Cranial Nerves: No cranial nerve deficit  Psychiatric:         Behavior: Behavior normal            Results & Discussion:   The patient is now 10 years out from her original diagnosis  Her last mammogram was normal she is scheduled for her next mammogram   Her clinical exam today shows no evidence of local regional or distant recurrent disease  We will see her back on as-needed basis  She will follow-up with routine screening with her PCP  He knows to contact us should she have any worrisome findings or changes or concerns  Advance Care Planning/Advance Directives:  I discussed the disease status, treatment plans and follow-up with the patient

## 2023-05-13 ENCOUNTER — APPOINTMENT (OUTPATIENT)
Dept: LAB | Age: 63
End: 2023-05-13

## 2023-05-13 DIAGNOSIS — Z13.1 ENCOUNTER FOR SCREENING FOR DIABETES MELLITUS: ICD-10-CM

## 2023-05-13 DIAGNOSIS — Z13.6 ENCOUNTER FOR LIPID SCREENING FOR CARDIOVASCULAR DISEASE: ICD-10-CM

## 2023-05-13 DIAGNOSIS — F41.8 MIXED ANXIETY AND DEPRESSIVE DISORDER: ICD-10-CM

## 2023-05-13 DIAGNOSIS — J45.20 MILD INTERMITTENT ASTHMA WITHOUT COMPLICATION: ICD-10-CM

## 2023-05-13 DIAGNOSIS — Z13.220 ENCOUNTER FOR LIPID SCREENING FOR CARDIOVASCULAR DISEASE: ICD-10-CM

## 2023-05-13 LAB
BASOPHILS # BLD AUTO: 0.02 THOUSANDS/ÂΜL (ref 0–0.1)
BASOPHILS NFR BLD AUTO: 0 % (ref 0–1)
CHOLEST SERPL-MCNC: 219 MG/DL
EOSINOPHIL # BLD AUTO: 0.05 THOUSAND/ÂΜL (ref 0–0.61)
EOSINOPHIL NFR BLD AUTO: 1 % (ref 0–6)
ERYTHROCYTE [DISTWIDTH] IN BLOOD BY AUTOMATED COUNT: 12.8 % (ref 11.6–15.1)
EST. AVERAGE GLUCOSE BLD GHB EST-MCNC: 97 MG/DL
HBA1C MFR BLD: 5 %
HCT VFR BLD AUTO: 42.7 % (ref 34.8–46.1)
HDLC SERPL-MCNC: 60 MG/DL
HGB BLD-MCNC: 13.5 G/DL (ref 11.5–15.4)
IMM GRANULOCYTES # BLD AUTO: 0.02 THOUSAND/UL (ref 0–0.2)
IMM GRANULOCYTES NFR BLD AUTO: 0 % (ref 0–2)
LDLC SERPL CALC-MCNC: 144 MG/DL (ref 0–100)
LYMPHOCYTES # BLD AUTO: 1.99 THOUSANDS/ÂΜL (ref 0.6–4.47)
LYMPHOCYTES NFR BLD AUTO: 30 % (ref 14–44)
MCH RBC QN AUTO: 29.9 PG (ref 26.8–34.3)
MCHC RBC AUTO-ENTMCNC: 31.6 G/DL (ref 31.4–37.4)
MCV RBC AUTO: 95 FL (ref 82–98)
MONOCYTES # BLD AUTO: 0.51 THOUSAND/ÂΜL (ref 0.17–1.22)
MONOCYTES NFR BLD AUTO: 8 % (ref 4–12)
NEUTROPHILS # BLD AUTO: 4.07 THOUSANDS/ÂΜL (ref 1.85–7.62)
NEUTS SEG NFR BLD AUTO: 61 % (ref 43–75)
NONHDLC SERPL-MCNC: 159 MG/DL
NRBC BLD AUTO-RTO: 0 /100 WBCS
PLATELET # BLD AUTO: 203 THOUSANDS/UL (ref 149–390)
PMV BLD AUTO: 10 FL (ref 8.9–12.7)
RBC # BLD AUTO: 4.52 MILLION/UL (ref 3.81–5.12)
TRIGL SERPL-MCNC: 77 MG/DL
WBC # BLD AUTO: 6.66 THOUSAND/UL (ref 4.31–10.16)

## 2023-05-17 ENCOUNTER — OFFICE VISIT (OUTPATIENT)
Dept: FAMILY MEDICINE CLINIC | Facility: CLINIC | Age: 63
End: 2023-05-17

## 2023-05-17 VITALS
SYSTOLIC BLOOD PRESSURE: 106 MMHG | HEART RATE: 68 BPM | HEIGHT: 60 IN | TEMPERATURE: 97.4 F | WEIGHT: 141 LBS | OXYGEN SATURATION: 97 % | DIASTOLIC BLOOD PRESSURE: 70 MMHG | RESPIRATION RATE: 14 BRPM | BODY MASS INDEX: 27.68 KG/M2

## 2023-05-17 DIAGNOSIS — E55.9 VITAMIN D DEFICIENCY: ICD-10-CM

## 2023-05-17 DIAGNOSIS — F41.8 MIXED ANXIETY AND DEPRESSIVE DISORDER: ICD-10-CM

## 2023-05-17 DIAGNOSIS — D35.1 PARATHYROID ADENOMA: ICD-10-CM

## 2023-05-17 DIAGNOSIS — E78.00 HYPERCHOLESTEROLEMIA: ICD-10-CM

## 2023-05-17 DIAGNOSIS — Z13.1 SCREENING FOR DIABETES MELLITUS: ICD-10-CM

## 2023-05-17 DIAGNOSIS — Z00.01 ENCOUNTER FOR GENERAL ADULT MEDICAL EXAMINATION WITH ABNORMAL FINDINGS: Primary | ICD-10-CM

## 2023-05-17 DIAGNOSIS — F90.9 ATTENTION DEFICIT HYPERACTIVITY DISORDER (ADHD), UNSPECIFIED ADHD TYPE: ICD-10-CM

## 2023-05-17 RX ORDER — DEXTROAMPHETAMINE SACCHARATE, AMPHETAMINE ASPARTATE, DEXTROAMPHETAMINE SULFATE AND AMPHETAMINE SULFATE 2.5; 2.5; 2.5; 2.5 MG/1; MG/1; MG/1; MG/1
10 TABLET ORAL DAILY
Qty: 90 TABLET | Refills: 0 | Status: SHIPPED | OUTPATIENT
Start: 2023-05-17

## 2023-05-17 RX ORDER — DEXTROAMPHETAMINE SACCHARATE, AMPHETAMINE ASPARTATE MONOHYDRATE, DEXTROAMPHETAMINE SULFATE AND AMPHETAMINE SULFATE 5; 5; 5; 5 MG/1; MG/1; MG/1; MG/1
20 CAPSULE, EXTENDED RELEASE ORAL EVERY MORNING
Qty: 90 CAPSULE | Refills: 0 | Status: SHIPPED | OUTPATIENT
Start: 2023-05-17

## 2023-05-17 RX ORDER — SERTRALINE HYDROCHLORIDE 100 MG/1
100 TABLET, FILM COATED ORAL
Qty: 90 TABLET | Refills: 0 | Status: SHIPPED | OUTPATIENT
Start: 2023-05-17

## 2023-05-17 NOTE — PROGRESS NOTES
"  [address] [facility]    NAME: Orin Garrido  AGE: 58 y o  SEX: female  : 1960     DATE: 2023     Assessment and Plan:     Problem List Items Addressed This Visit        Other    Hypercholesterolemia    Mixed anxiety and depressive disorder    Relevant Medications    amphetamine-dextroamphetamine (ADDERALL XR, 20MG,) 20 MG 24 hr capsule    amphetamine-dextroamphetamine (ADDERALL, 10MG,) 10 mg tablet    sertraline (ZOLOFT) 100 mg tablet    ADHD    Relevant Medications    amphetamine-dextroamphetamine (ADDERALL XR, 20MG,) 20 MG 24 hr capsule    amphetamine-dextroamphetamine (ADDERALL, 10MG,) 10 mg tablet    sertraline (ZOLOFT) 100 mg tablet    Other Relevant Orders    Comprehensive metabolic panel   Other Visit Diagnoses     Encounter for general adult medical examination with abnormal findings    -  Primary    Vitamin D deficiency        Parathyroid adenoma        Screening for diabetes mellitus          reviewed and discussed labs  Transient \"off balance\" ,likely vertigo due to uncontrolled allergies, resolved after she started Fexofenadine 180 mg daily  Follow up if it recurs  LDL is slighltly elevated, watch diet for fried and fatty foods, ascvd 3 5%, no statins recommended  Obtain cmp  Tolerating Adderral XR 20 and Adderral 10 as needed well, continue current regiment  Continue sertraline    Immunizations and preventive care screenings were discussed with patient today  Appropriate education was printed on patient's after visit summary  Counseling:  Dental Health: discussed importance of regular tooth brushing, flossing, and dental visits  Injury prevention: discussed safety/seat belts, safety helmets, smoke detectors, carbon dioxide detectors, and smoking near bedding or upholstery  · Exercise: the importance of regular exercise/physical activity was discussed   Recommend exercise 3-5 times per week for at least 30 " minutes  No follow-ups on file  Chief Complaint:     Chief Complaint   Patient presents with   • Follow-up      History of Present Illness:     Adult Annual Physical   Patient here for a comprehensive physical exam  The patient reports no problems  Diet and Physical Activity  · Diet/Nutrition: well balanced diet and consuming 3-5 servings of fruits/vegetables daily  · Exercise: moderate cardiovascular exercise  Depression Screening  PHQ-2/9 Depression Screening    Little interest or pleasure in doing things: 0 - not at all  Feeling down, depressed, or hopeless: 0 - not at all  Trouble falling or staying asleep, or sleeping too much: 0 - not at all  Feeling tired or having little energy: 0 - not at all  Poor appetite or overeatin - not at all  Feeling bad about yourself - or that you are a failure or have let yourself or your family down: 0 - not at all  Trouble concentrating on things, such as reading the newspaper or watching television: 0 - not at all  Moving or speaking so slowly that other people could have noticed  Or the opposite - being so fidgety or restless that you have been moving around a lot more than usual: 0 - not at all  Thoughts that you would be better off dead, or of hurting yourself in some way: 0 - not at all  PHQ-9 Score: 0   PHQ-9 Interpretation: No or Minimal depression        General Health  · Sleep: sleeps well  · Hearing: grossly normal   · Vision: goes for regular eye exams  · Dental: regular dental visits  /GYN Health  · Patient is: postmenopausal  ·      Review of Systems:     Review of Systems   Constitutional: Negative for fatigue and fever  HENT: Negative for congestion, ear discharge, ear pain, facial swelling, mouth sores, rhinorrhea, sore throat and trouble swallowing  Ear fullness -right+   Eyes: Negative for pain and redness  Respiratory: Negative for cough, shortness of breath and wheezing      Cardiovascular: Negative for chest pain, palpitations and leg swelling  Gastrointestinal: Negative for abdominal pain, blood in stool, constipation, diarrhea and nausea  Genitourinary: Negative for dysuria, hematuria and urgency  Musculoskeletal: Positive for back pain (right mid back paraspinal)  Negative for arthralgias and myalgias  Skin: Negative for rash and wound  Neurological: Positive for dizziness (off balance once but has resolved)  Negative for seizures, syncope and headaches  Hematological: Negative for adenopathy  Psychiatric/Behavioral: Negative for agitation and behavioral problems        Past Medical History:     Past Medical History:   Diagnosis Date   • ADHD    • Anxiety    • Arthritis    • Asthma    • Breast cancer ([facility] ) 01/01/2012   • Cancer ([facility] ) 08/2012    DCIS-right breast   • Closed fracture of radial styloid    • CPAP (continuous positive airway pressure) dependence    • Endometriosis    • Family history of colon cancer in mother    • Forceps delivery     1991 daughter   • GERD (gastroesophageal reflux disease)    • H/O mitral valve prolapse     no regurgitation   • Hiatal hernia    • History of radiation therapy    • Hyperlipidemia    • Infertility, female    • Normal delivery     1998 daughter   • Sleep apnea     on cpap   • TB lung, latent     treated with INH (in her 29's)      Past Surgical History:     Past Surgical History:   Procedure Laterality Date   • BREAST BIOPSY Right 04/01/2012    biopsy breast percutaneous needle core   • BREAST LUMPECTOMY Right 08/01/2012   • CHOLECYSTECTOMY  01/23/2013   • COLONOSCOPY     • DILATION AND CURETTAGE OF UTERUS     • ENDOMETRIAL BIOPSY      without cervical dilation   • KNEE ARTHROSCOPY      Plica syndrome   • LAPAROSCOPY      Exploratory 1990 & 1994   • SALUD-EN-Y PROCEDURE  01/23/2013    Dr Brittni Rondon   • UPPER GASTROINTESTINAL ENDOSCOPY     • US GUIDANCE  5/14/2013      Social History:     Social History     Socioeconomic History   • Marital status:      Spouse name: None   • Number of children: 2   • Years of education: None   • Highest education level: None   Occupational History   • None   Tobacco Use   • Smoking status: Never   • Smokeless tobacco: Never   Vaping Use   • Vaping Use: Never used   Substance and Sexual Activity   • Alcohol use:  Yes     Alcohol/week: 0 0 standard drinks     Comment: rarely   • Drug use: Never   • Sexual activity: Yes     Partners: Male     Birth control/protection: Post-menopausal   Other Topics Concern   • None   Social History Narrative        2 children    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, ceramics     Social Determinants of Health     Financial Resource Strain: Not on file   Food Insecurity: Not on file   Transportation Needs: Not on file   Physical Activity: Not on file   Stress: Not on file   Social Connections: Not on file   Intimate Partner Violence: Not on file   Housing Stability: Not on file      Family History:     Family History   Problem Relation Age of Onset   • Colon cancer Mother 48   • Cancer Mother         colon   • Osteoporosis Mother    • Stroke Father    • Hypertension Father    • Cancer Sister 70        spinal cancer   • Kidney cancer Sister 79   • No Known Problems Daughter    • No Known Problems Daughter    • No Known Problems Maternal Grandmother    • Colon cancer Maternal Grandfather [de-identified]   • Cancer Maternal Grandfather         colon   • Hypertension Paternal Grandmother    • Stroke Paternal Grandmother    • No Known Problems Paternal Grandfather    • No Known Problems Paternal Aunt    • Hypertension Family    • Mitral valve prolapse Family    • Osteoporosis Family    • Varicose Veins Family       Current Medications:     Current Outpatient Medications   Medication Sig Dispense Refill   • alendronate (FOSAMAX) 70 mg tablet Take once every week on empty stomach with water and stay upright for 1/2 hour after taking the medication 12 tablet 3   • amphetamine-dextroamphetamine (ADDERALL XR, 20MG,) 20 MG 24 hr capsule Take 1 capsule (20 mg total) by mouth every morning Max Daily Amount: 20 mg 90 capsule 0   • amphetamine-dextroamphetamine (ADDERALL, 10MG,) 10 mg tablet Take 1 tablet (10 mg total) by mouth daily In afternoon as needed in addition to your XR 20 mg in AM Max Daily Amount: 10 mg 90 tablet 0   • calcium carbonate (OS-IVONE) 600 MG tablet Take 600 mg by mouth 2 (two) times a day with meals     • Cholecalciferol (VITAMIN D) 2000 units CAPS Take by mouth Take 5000IU daily     • Multiple Vitamin (MULTI-VITAMIN DAILY) TABS Take by mouth     • sertraline (ZOLOFT) 100 mg tablet Take 1 tablet (100 mg total) by mouth daily at bedtime 90 tablet 0     No current facility-administered medications for this visit  Allergies:     No Known Allergies   Physical Exam:     /70 (BP Location: Left arm, Patient Position: Sitting, Cuff Size: Adult)   Pulse 68   Temp (!) 97 4 °F (36 3 °C) (Tympanic)   Resp 14   Ht 5' (1 524 m)   Wt 64 kg (141 lb)   SpO2 97%   BMI 27 54 kg/m²     Physical Exam  Vitals and nursing note reviewed  Constitutional:       Appearance: She is well-developed  HENT:      Head: Normocephalic and atraumatic  Right Ear: Tympanic membrane, ear canal and external ear normal       Left Ear: Tympanic membrane, ear canal and external ear normal       Nose: Nose normal       Mouth/Throat:      Pharynx: No oropharyngeal exudate  Eyes:      General: No scleral icterus  Right eye: No discharge  Left eye: No discharge  Conjunctiva/sclera: Conjunctivae normal       Pupils: Pupils are equal, round, and reactive to light  Neck:      Thyroid: No thyromegaly  Cardiovascular:      Rate and Rhythm: Normal rate and regular rhythm  Heart sounds: No murmur heard  No gallop  Pulmonary:      Effort: Pulmonary effort is normal  No respiratory distress  Breath sounds: Normal breath sounds  No wheezing or rales     Abdominal:      Palpations: Abdomen is soft  Tenderness: There is no abdominal tenderness  Musculoskeletal:         General: No tenderness or deformity  Cervical back: Normal range of motion  Right lower leg: No edema  Left lower leg: No edema  Lymphadenopathy:      Cervical: No cervical adenopathy  Skin:     General: Skin is warm  Capillary Refill: Capillary refill takes less than 2 seconds  Findings: No erythema or rash  Neurological:      Mental Status: She is alert and oriented to person, place, and time  Deep Tendon Reflexes: Reflexes normal    Psychiatric:         Behavior: Behavior normal          Thought Content:  Thought content normal          Judgment: Judgment normal           Kylah Sandoval MD  [address] [place] [address]

## 2023-05-20 DIAGNOSIS — M81.0 OSTEOPOROSIS WITHOUT CURRENT PATHOLOGICAL FRACTURE, UNSPECIFIED OSTEOPOROSIS TYPE: ICD-10-CM

## 2023-05-23 RX ORDER — ALENDRONATE SODIUM 70 MG/1
TABLET ORAL
Qty: 12 TABLET | Refills: 0 | Status: SHIPPED | OUTPATIENT
Start: 2023-05-23

## 2023-07-04 ENCOUNTER — OFFICE VISIT (OUTPATIENT)
Dept: URGENT CARE | Age: 63
End: 2023-07-04
Payer: COMMERCIAL

## 2023-07-04 VITALS — RESPIRATION RATE: 18 BRPM | OXYGEN SATURATION: 97 % | TEMPERATURE: 97.5 F | HEART RATE: 88 BPM

## 2023-07-04 DIAGNOSIS — J20.8 ACUTE VIRAL BRONCHITIS: ICD-10-CM

## 2023-07-04 DIAGNOSIS — H69.83 DYSFUNCTION OF BOTH EUSTACHIAN TUBES: Primary | ICD-10-CM

## 2023-07-04 PROCEDURE — 99213 OFFICE O/P EST LOW 20 MIN: CPT | Performed by: PHYSICIAN ASSISTANT

## 2023-07-04 RX ORDER — PREDNISONE 20 MG/1
40 TABLET ORAL DAILY
Qty: 10 TABLET | Refills: 0 | Status: SHIPPED | OUTPATIENT
Start: 2023-07-04 | End: 2023-07-09

## 2023-07-04 RX ORDER — ALBUTEROL SULFATE 90 UG/1
AEROSOL, METERED RESPIRATORY (INHALATION)
COMMUNITY

## 2023-07-04 NOTE — PROGRESS NOTES
[facility]        NAME: Eddie Gleason is a 58 y.o. female  : 1960    MRN: [id number]  DATE: 2023  TIME: 9:08 AM    Assessment and Plan   Dysfunction of both eustachian tubes [H69.83]  1. Dysfunction of both eustachian tubes  predniSONE 20 mg tablet      2. Acute viral bronchitis  predniSONE 20 mg tablet      Pt presents with symptoms consistent with acute viral URI. She has bilateral ear effusion on exam with no erythema consistent with eustachian tube dysfunction. Cough consistent with viral bronchitis. Recommend prednisone to treat symptoms. Patient Instructions   Patient Instructions   Take prednisone as prescribed. Common side effects include irritability, insomnia, hunger, and increased anxiety. May continue Mucinex DM and other over the counter cold medications as needed. Recommend Flonase nasal spray daily for chronic fluid in the ears. If this becomes an ongoing issue that is causing you pain or discomfort or decreased hearing discuss ENT referral with PCP. If symptoms do not improve in 2-3 days,  follow-up with PCP. If symptoms worsen or new symptoms develop, report to the emergency department immediately. Chief Complaint     Chief Complaint   Patient presents with   • Earache   • Cold Like Symptoms   • Cough   • Jaw Pain     Patient been sick since Friday with bilateral ear fullness, nasal congestion, headache, and jaw pain         History of Present Illness       58year old female presents with complaint of sinus congestion, bilateral ear congestion and pain with radiation into the jaw, headache, sinus pressure, and cough x 5 days. Pt reports clear nasal drainage when she is able to get anything out of the nose and reports cough is productive of scant light yellow sputum since starting Mucinex. Pt reports some mild diarrhea yesterday after taking Mucinex for the first time. No other concerns or complaints today.        Review of Systems   Review of Systems Constitutional: Negative for chills, fatigue and fever. HENT: Positive for congestion, postnasal drip, rhinorrhea, sinus pressure and sinus pain. Negative for sore throat, trouble swallowing and voice change. Respiratory: Positive for cough. Negative for shortness of breath. Cardiovascular: Negative for chest pain. Gastrointestinal: Negative for diarrhea, nausea and vomiting. Musculoskeletal: Negative for myalgias. Neurological: Positive for headaches. Current Medications       Current Outpatient Medications:   •  predniSONE 20 mg tablet, Take 2 tablets (40 mg total) by mouth daily for 5 days, Disp: 10 tablet, Rfl: 0  •  albuterol (ProAir HFA) 90 mcg/act inhaler, Inhale 2 puffs every 4 hours by inhalation route for 30 days. , Disp: , Rfl:   •  alendronate (FOSAMAX) 70 mg tablet, Take once every week on empty stomach with water and stay upright for 1/2 hour after taking the medication, Disp: 12 tablet, Rfl: 0  •  amphetamine-dextroamphetamine (ADDERALL XR, 20MG,) 20 MG 24 hr capsule, Take 1 capsule (20 mg total) by mouth every morning Max Daily Amount: 20 mg, Disp: 90 capsule, Rfl: 0  •  amphetamine-dextroamphetamine (ADDERALL, 10MG,) 10 mg tablet, Take 1 tablet (10 mg total) by mouth daily In afternoon as needed in addition to your XR 20 mg in AM Max Daily Amount: 10 mg, Disp: 90 tablet, Rfl: 0  •  calcium carbonate (OS-IVONE) 600 MG tablet, Take 600 mg by mouth 2 (two) times a day with meals, Disp: , Rfl:   •  Cholecalciferol (VITAMIN D) 2000 units CAPS, Take by mouth Take 5000IU daily, Disp: , Rfl:   •  Multiple Vitamin (MULTI-VITAMIN DAILY) TABS, Take by mouth, Disp: , Rfl:   •  sertraline (ZOLOFT) 100 mg tablet, Take 1 tablet (100 mg total) by mouth daily at bedtime, Disp: 90 tablet, Rfl: 0    Current Allergies     Allergies as of 07/04/2023   • (No Known Allergies)            The following portions of the patient's history were reviewed and updated as appropriate: allergies, current medications, past family history, past medical history, past social history, past surgical history and problem list.     Past Medical History:   Diagnosis Date   • ADHD    • Anxiety    • Arthritis    • Asthma    • Breast cancer ([address]) 01/01/2012   • Cancer ([address]) 08/2012    DCIS-right breast   • Closed fracture of radial styloid    • CPAP (continuous positive airway pressure) dependence    • Endometriosis    • Family history of colon cancer in mother    • Forceps delivery     1991 daughter   • GERD (gastroesophageal reflux disease)    • H/O mitral valve prolapse     no regurgitation   • Hiatal hernia    • History of radiation therapy    • Hyperlipidemia    • Infertility, female    • Normal delivery     1998 daughter   • Sleep apnea     on cpap   • TB lung, latent     treated with INH (in her 29's)       Past Surgical History:   Procedure Laterality Date   • BREAST BIOPSY Right 04/01/2012    biopsy breast percutaneous needle core   • BREAST LUMPECTOMY Right 08/01/2012   • CHOLECYSTECTOMY  01/23/2013   • COLONOSCOPY     • DILATION AND CURETTAGE OF UTERUS     • ENDOMETRIAL BIOPSY      without cervical dilation   • KNEE ARTHROSCOPY      Plica syndrome   • LAPAROSCOPY      Exploratory 1990 & 1994   • SALUD-EN-Y PROCEDURE  01/23/2013    Dr Solon Kussmaul   • UPPER GASTROINTESTINAL ENDOSCOPY     • US GUIDANCE  5/14/2013       Family History   Problem Relation Age of Onset   • Colon cancer Mother 48   • Cancer Mother         colon   • Osteoporosis Mother    • Stroke Father    • Hypertension Father    • Cancer Sister 70        spinal cancer   • Kidney cancer Sister 79   • No Known Problems Daughter    • No Known Problems Daughter    • No Known Problems Maternal Grandmother    • Colon cancer Maternal Grandfather 80   • Cancer Maternal Grandfather         colon   • Hypertension Paternal Grandmother    • Stroke Paternal Grandmother    • No Known Problems Paternal Grandfather    • No Known Problems Paternal Aunt    • Hypertension Family • Mitral valve prolapse Family    • Osteoporosis Family    • Varicose Veins Family          Medications have been verified. Objective   Pulse 88   Temp 97.5 °F (36.4 °C)   Resp 18   SpO2 97%   No LMP recorded. Patient is postmenopausal.       Physical Exam     Physical Exam  Vitals and nursing note reviewed. Constitutional:       General: She is not in acute distress. Appearance: Normal appearance. She is not ill-appearing or toxic-appearing. HENT:      Head: Normocephalic and atraumatic. Jaw: No trismus. Right Ear: Ear canal and external ear normal. A middle ear effusion is present. There is no impacted cerumen. No foreign body. No hemotympanum. Tympanic membrane is not injected, erythematous, retracted or bulging. Left Ear: Ear canal and external ear normal. A middle ear effusion is present. There is no impacted cerumen. No foreign body. No hemotympanum. Tympanic membrane is not injected, erythematous, retracted or bulging. Nose: Mucosal edema, congestion and rhinorrhea present. No nasal deformity. Rhinorrhea is clear. Right Nostril: No foreign body, epistaxis or occlusion. Left Nostril: No foreign body, epistaxis or occlusion. Right Turbinates: Not enlarged, swollen or pale. Left Turbinates: Not enlarged, swollen or pale. Mouth/Throat:      Lips: Pink. No lesions. Mouth: Mucous membranes are moist. No injury, oral lesions or angioedema. Dentition: Normal dentition. Tongue: No lesions. Tongue does not deviate from midline. Palate: No mass and lesions. Pharynx: Uvula midline. Posterior oropharyngeal erythema present. No pharyngeal swelling, oropharyngeal exudate or uvula swelling. Tonsils: No tonsillar exudate or tonsillar abscesses. Comments: Mild erythema of the posterior oropharynx with postnasal drip present. Eyes:      General: Lids are normal. Gaze aligned appropriately. No allergic shiner.      Extraocular Movements: Extraocular movements intact. Cardiovascular:      Rate and Rhythm: Normal rate and regular rhythm. Heart sounds: Normal heart sounds, S1 normal and S2 normal. Heart sounds not distant. No murmur heard. No friction rub. No gallop. Pulmonary:      Effort: Pulmonary effort is normal.      Breath sounds: Normal breath sounds. No decreased breath sounds, wheezing, rhonchi or rales. Comments: Patient speaking in full sentences with no increased respiratory effort. No audible wheezing or stridor. Lymphadenopathy:      Cervical: Cervical adenopathy present. Right cervical: Superficial cervical adenopathy present. No deep or posterior cervical adenopathy. Left cervical: Superficial cervical adenopathy present. No deep or posterior cervical adenopathy. Skin:     General: Skin is warm and dry. Neurological:      Mental Status: She is alert and oriented to person, place, and time. Coordination: Coordination is intact. Gait: Gait is intact. Psychiatric:         Attention and Perception: Attention and perception normal.         Mood and Affect: Mood and affect normal.         Speech: Speech normal.         Behavior: Behavior is cooperative. Note: Portions of this record may have been created with voice recognition software. Occasional wrong word or "sound a like" substitutions may have occurred due to the inherent limitations of voice recognition software. Please read the chart carefully and recognize, using context, where substitutions have occurred. *

## 2023-07-04 NOTE — PATIENT INSTRUCTIONS
Take prednisone as prescribed. Common side effects include irritability, insomnia, hunger, and increased anxiety. Recommend masking until symptoms resolve. May continue Mucinex DM and other over the counter cold medications as needed. Recommend Flonase nasal spray daily for chronic fluid in the ears. If this becomes an ongoing issue that is causing you pain or discomfort or decreased hearing discuss ENT referral with PCP. If symptoms do not improve in 2-3 days,  follow-up with PCP. If symptoms worsen or new symptoms develop, report to the emergency department immediately.

## 2023-07-06 ENCOUNTER — HOSPITAL ENCOUNTER (OUTPATIENT)
Dept: RADIOLOGY | Age: 63
Discharge: HOME | End: 2023-07-06
Payer: COMMERCIAL

## 2023-07-06 DIAGNOSIS — M81.0 OSTEOPOROSIS WITHOUT CURRENT PATHOLOGICAL FRACTURE, UNSPECIFIED OSTEOPOROSIS TYPE: ICD-10-CM

## 2023-07-06 PROCEDURE — 77080 DXA BONE DENSITY AXIAL: CPT

## 2023-07-14 ENCOUNTER — OFFICE VISIT (OUTPATIENT)
Dept: ENDOCRINOLOGY | Facility: CLINIC | Age: 63
End: 2023-07-14
Payer: COMMERCIAL

## 2023-07-14 VITALS
HEART RATE: 71 BPM | SYSTOLIC BLOOD PRESSURE: 110 MMHG | OXYGEN SATURATION: 96 % | BODY MASS INDEX: 29.06 KG/M2 | HEIGHT: 60 IN | DIASTOLIC BLOOD PRESSURE: 76 MMHG | WEIGHT: 148 LBS | TEMPERATURE: 97.9 F

## 2023-07-14 DIAGNOSIS — M81.8 OTHER OSTEOPOROSIS WITHOUT CURRENT PATHOLOGICAL FRACTURE: Primary | ICD-10-CM

## 2023-07-14 DIAGNOSIS — E55.9 VITAMIN D DEFICIENCY: ICD-10-CM

## 2023-07-14 DIAGNOSIS — E78.00 HYPERCHOLESTEROLEMIA: ICD-10-CM

## 2023-07-14 PROCEDURE — 99214 OFFICE O/P EST MOD 30 MIN: CPT | Performed by: INTERNAL MEDICINE

## 2023-07-14 RX ORDER — ALENDRONATE SODIUM 70 MG/1
TABLET ORAL
Qty: 12 TABLET | Refills: 1 | Status: SHIPPED | OUTPATIENT
Start: 2023-07-14

## 2023-07-14 NOTE — PROGRESS NOTES
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 Last visit was 11/14/22.     She reports improved fatigue since taking vitamin D and calcium. Fosamax was started last visit.     Hx of menopause with LMP age 38. She had hx of lt and rt wrist fractures 4 yrs ago. Recent fall and off balance more recently.     Max adult weight 204lbs prior to RYGB. Presently 141lb.     She had a thyroid US 8/8/22 that showed a 6mm lesion posterior to Lt upper pole thyroid. Follow up CT parathyroid showed 3mm lesion without characteristic enhancement for a parathyroid adenoma.     DXA 5/21/21: Tscores -3.3 LS, -2.5 LTH and -3.8LFN. 10yr FRAX 8% hip and 20% major osteoporotic fracture. 7/6/2023 DXA: T-scores -3.4 LS, -2 LTH, -3.4 LFN. Statistically significant increase in BMD.  10-year FRAX score 6.2% hip and 18% major osteoporotic fracture.     No History of external radiation, recent Iodine loading or radiological diagnostic studies. No difficulty with swallowing or breathing or change in voice.     FH : mother had severe osteoporosis age 52's.     Patient Active Problem List   Diagnosis   • History of ductal carcinoma in situ (DCIS) of breast   • Hypercholesterolemia   • Obstructive sleep apnea   • Status post gastric bypass for obesity   • Mixed anxiety and depressive disorder   • Premature menopause   • ADHD   • Family history of colon cancer   • Dysphagia   • Elevated bilirubin   • Osteoporosis without current pathological fracture     Past Medical History:   Diagnosis Date   • ADHD    • Anxiety    • Arthritis    • Asthma    • Breast cancer ([address]) 01/01/2012   • Cancer ([address]) 08/2012    DCIS-right breast   • Closed fracture of radial styloid    • CPAP (continuous positive airway pressure) dependence    • Endometriosis    • Family history of colon cancer in mother    • Forceps delivery     1991 daughter   • GERD (gastroesophageal reflux disease)    • H/O mitral valve prolapse     no regurgitation   • Hiatal hernia    • History of radiation therapy    • Hyperlipidemia    • Infertility, female    • Normal delivery     1998 daughter   • Sleep apnea     on cpap   • TB lung, latent     treated with INH (in her 29's)      Past Surgical History:   Procedure Laterality Date   • BREAST BIOPSY Right 04/01/2012    biopsy breast percutaneous needle core   • BREAST LUMPECTOMY Right 08/01/2012   • CHOLECYSTECTOMY  01/23/2013   • COLONOSCOPY     • DILATION AND CURETTAGE OF UTERUS     • ENDOMETRIAL BIOPSY      without cervical dilation   • KNEE ARTHROSCOPY      Plica syndrome   • LAPAROSCOPY      Exploratory 1990 & 1994   • SALUD-EN-Y PROCEDURE  01/23/2013    Dr Shahla Hodgson   • UPPER GASTROINTESTINAL ENDOSCOPY     • US GUIDANCE  5/14/2013      Family History   Problem Relation Age of Onset   • Colon cancer Mother 48   • Cancer Mother         colon   • Osteoporosis Mother    • Stroke Father    • Hypertension Father    • Cancer Sister 70        spinal cancer   • Kidney cancer Sister 79   • No Known Problems Daughter    • No Known Problems Daughter    • No Known Problems Maternal Grandmother    • Colon cancer Maternal Grandfather 80   • Cancer Maternal Grandfather         colon   • Hypertension Paternal Grandmother    • Stroke Paternal Grandmother    • No Known Problems Paternal Grandfather    • No Known Problems Paternal Aunt    • Hypertension Family    • Mitral valve prolapse Family    • Osteoporosis Family    • Varicose Veins Family      Social History     Tobacco Use   • Smoking status: Never   • Smokeless tobacco: Never   Substance Use Topics   • Alcohol use:  Yes     Alcohol/week: 0.0 standard drinks of alcohol     Comment: rarely     No Known Allergies      Current Outpatient Medications:   •  albuterol (ProAir HFA) 90 mcg/act inhaler, Inhale 2 puffs every 4 hours by inhalation route for 30 days. , Disp: , Rfl:   •  alendronate (FOSAMAX) 70 mg tablet, Take once every week on empty stomach with water and stay upright for 1/2 hour after taking the medication, Disp: 12 tablet, Rfl: 0  •  amphetamine-dextroamphetamine (ADDERALL XR, 20MG,) 20 MG 24 hr capsule, Take 1 capsule (20 mg total) by mouth every morning Max Daily Amount: 20 mg, Disp: 90 capsule, Rfl: 0  •  amphetamine-dextroamphetamine (ADDERALL, 10MG,) 10 mg tablet, Take 1 tablet (10 mg total) by mouth daily In afternoon as needed in addition to your XR 20 mg in AM Max Daily Amount: 10 mg, Disp: 90 tablet, Rfl: 0  •  calcium carbonate (OS-IVONE) 600 MG tablet, Take 600 mg by mouth 2 (two) times a day with meals, Disp: , Rfl:   •  Cholecalciferol (VITAMIN D) 2000 units CAPS, Take by mouth Take 5000IU daily, Disp: , Rfl:   •  Multiple Vitamin (MULTI-VITAMIN DAILY) TABS, Take by mouth, Disp: , Rfl:   •  sertraline (ZOLOFT) 100 mg tablet, Take 1 tablet (100 mg total) by mouth daily at bedtime, Disp: 90 tablet, Rfl: 0    Review of Systems   HENT: Negative. Eyes: Negative. Respiratory: Negative. Cardiovascular: Negative. Gastrointestinal: Negative. Endocrine: Negative. Musculoskeletal: Negative. Skin: Negative. Allergic/Immunologic: Negative. Neurological: Negative. Hematological: Negative. Psychiatric/Behavioral: Negative. Physical Exam:  Body mass index is 28.9 kg/m². /76   Pulse 71   Temp 97.9 °F (36.6 °C)   Ht 5' (1.524 m)   Wt 67.1 kg (148 lb)   SpO2 96%   BMI 28.90 kg/m²    Vitals:    07/14/23 0814   Weight: 67.1 kg (148 lb)        Physical Exam  Constitutional:       General: She is not in acute distress. Appearance: She is well-developed. She is not ill-appearing. HENT:      Head: Normocephalic and atraumatic. Nose: Nose normal.      Mouth/Throat:      Pharynx: Oropharynx is clear. Eyes:      Extraocular Movements: Extraocular movements intact. Conjunctiva/sclera: Conjunctivae normal.   Neck:      Thyroid: No thyromegaly. Cardiovascular:      Rate and Rhythm: Normal rate. Pulmonary:      Effort: Pulmonary effort is normal.   Musculoskeletal:         General: No deformity. Cervical back: Normal range of motion. Skin:     Capillary Refill: Capillary refill takes less than 2 seconds. Coloration: Skin is not pale. Findings: No rash. Neurological:      Mental Status: She is alert and oriented to person, place, and time. Psychiatric:         Behavior: Behavior normal.         Labs:   Lab Results   Component Value Date    HGBA1C 5.0 05/13/2023       Lab Results   Component Value Date    SKR9ZXAHLDFP 3.950 11/08/2022       Lab Results   Component Value Date    CREATININE 0.71 11/08/2022    CREATININE 0.84 07/21/2022    CREATININE 0.77 01/27/2022    BUN 18 11/08/2022    K 3.7 11/08/2022     11/08/2022    CO2 27 11/08/2022     eGFR   Date Value Ref Range Status   11/08/2022 90 ml/min/1.73sq m Final       Lab Results   Component Value Date    ALT 32 11/08/2022    AST 22 11/08/2022    ALKPHOS 112 11/08/2022       Lab Results   Component Value Date    CHOLESTEROL 219 (H) 05/13/2023    CHOLESTEROL 211 (H) 04/04/2023    CHOLESTEROL 220 (H) 06/07/2022     Lab Results   Component Value Date    HDL 60 05/13/2023    HDL 60 04/04/2023    HDL 74 06/07/2022     Lab Results   Component Value Date    TRIG 77 05/13/2023    TRIG 112 04/04/2023    TRIG 56 06/07/2022     Lab Results   Component Value Date    NONHDLC 159 05/13/2023    [address] 151 04/04/2023    [address] 146 06/07/2022         Impression:  1. Other osteoporosis without current pathological fracture    2. Hypercholesterolemia    3. Vitamin D deficiency         Plan:    Sadi Billingsley was seen today for osteoporosis. Diagnoses and all orders for this visit:    Other osteoporosis without current pathological fracture. She has a somewhat early onset osteoporosis associated with family history. She was started on Fosamax 11/14/2022. Advised to continue calcium, vitamin D3 and weightbearing exercises. Continue Fosamax-reiterated to use it daily a.m. on empty stomach and keep upright for 30 to 60 minutes after. Next DEXA scan is due 7/2025. Advised repeat labs prior to next visit in 1 year. -     alendronate (FOSAMAX) 70 mg tablet; Take once every week on empty stomach with water and stay upright for 1/2 hour after taking the medication  -     Comprehensive metabolic panel; Future  -     Phosphorus; Future  -     PTH, intact; Future    Hypercholesterolemia. Her ASCVD risk score is 3.8%. Advised diet and lifestyle changes. She has good HDL levels. Non-HDL cholesterol is borderline. Vitamin D deficiency  -     Vitamin D 25 hydroxy; Future        I have spent 32 minutes with patient today in which greater than 50% of this time was spent in counseling/coordination of care. Discussed with the patient and all questioned fully answered. She will call me if any problems arise. Educated/ Counseled patient on diagnostic test results, prognosis, risk vs benefit of treatment options, importance of treatment compliance, healthy life and lifestyle choices.       [facility]Olympic [facility] Hospitalmarcella

## 2023-08-10 ENCOUNTER — OFFICE VISIT (OUTPATIENT)
Dept: URGENT CARE | Age: 63
End: 2023-08-10
Payer: COMMERCIAL

## 2023-08-10 VITALS
SYSTOLIC BLOOD PRESSURE: 131 MMHG | BODY MASS INDEX: 29.06 KG/M2 | WEIGHT: 148 LBS | HEIGHT: 60 IN | HEART RATE: 81 BPM | DIASTOLIC BLOOD PRESSURE: 62 MMHG | RESPIRATION RATE: 16 BRPM

## 2023-08-10 DIAGNOSIS — H10.32 ACUTE CONJUNCTIVITIS OF LEFT EYE, UNSPECIFIED ACUTE CONJUNCTIVITIS TYPE: Primary | ICD-10-CM

## 2023-08-10 PROCEDURE — 99213 OFFICE O/P EST LOW 20 MIN: CPT | Performed by: PHYSICIAN ASSISTANT

## 2023-08-10 RX ORDER — TOBRAMYCIN 3 MG/ML
1 SOLUTION/ DROPS OPHTHALMIC
Qty: 5 ML | Refills: 0 | Status: SHIPPED | OUTPATIENT
Start: 2023-08-10 | End: 2023-08-15

## 2023-08-10 NOTE — PATIENT INSTRUCTIONS
Use eye drops as prescribed. You are contagious until you have been on drops for 24 hours. Discussed hand hygiene washing hands frequently for at least 20 seconds with soap and water. Wash all linens after single use in hot water. Disinfect frequently touched surfaces on a regular basis. If symptoms or not improved in 2 to 3 days follow-up with PCP or an eye care professional.  If symptoms worsen report to the emergency room immediately.

## 2023-08-10 NOTE — PROGRESS NOTES
[facility]        NAME: Jesus Cha is a 58 y.o. female  : 1960    MRN: [id number]  DATE: August 10, 2023  TIME: 9:27 AM    Assessment and Plan   Acute conjunctivitis of left eye, unspecified acute conjunctivitis type [H10.32]  1. Acute conjunctivitis of left eye, unspecified acute conjunctivitis type  tobramycin (TOBREX) 0.3 % SOLN      Pt presents with left conjunctivitis. Will be started on Tobramycin drops to treat. Patient Instructions   Use eye drops as prescribed. You are contagious until you have been on drops for 24 hours. Discussed hand hygiene washing hands frequently for at least 20 seconds with soap and water. Wash all linens after single use in hot water. Disinfect frequently touched surfaces on a regular basis. If symptoms or not improved in 2 to 3 days follow-up with PCP or an eye care professional.  If symptoms worsen report to the emergency room immediately. Chief Complaint     Chief Complaint   Patient presents with   • Eye Problem     Patient states she uses hard contacts. She recently had to purchase through a different method and states she may have an eye infection. Has been using eye cream.         History of Present Illness       58year old female presents with left eye redness and itching. She notes that it is swollen and was crusted shut this morning. She wears hard contact lenses. Denies pain, blurred vision, and photophobia. Review of Systems   Review of Systems   Eyes: Positive for discharge, redness and itching. Negative for photophobia, pain and visual disturbance.          Current Medications       Current Outpatient Medications:   •  alendronate (FOSAMAX) 70 mg tablet, Take once every week on empty stomach with water and stay upright for 1/2 hour after taking the medication, Disp: 12 tablet, Rfl: 1  •  amphetamine-dextroamphetamine (ADDERALL XR, 20MG,) 20 MG 24 hr capsule, Take 1 capsule (20 mg total) by mouth every morning Max Daily Amount: 20 mg, Disp: 90 capsule, Rfl: 0  •  amphetamine-dextroamphetamine (ADDERALL, 10MG,) 10 mg tablet, Take 1 tablet (10 mg total) by mouth daily In afternoon as needed in addition to your XR 20 mg in AM Max Daily Amount: 10 mg, Disp: 90 tablet, Rfl: 0  •  calcium carbonate (OS-IVONE) 600 MG tablet, Take 600 mg by mouth 2 (two) times a day with meals, Disp: , Rfl:   •  Cholecalciferol (VITAMIN D) 2000 units CAPS, Take by mouth Take 5000IU daily, Disp: , Rfl:   •  Multiple Vitamin (MULTI-VITAMIN DAILY) TABS, Take by mouth, Disp: , Rfl:   •  sertraline (ZOLOFT) 100 mg tablet, Take 1 tablet (100 mg total) by mouth daily at bedtime, Disp: 90 tablet, Rfl: 0  •  tobramycin (TOBREX) 0.3 % SOLN, Administer 1 drop into the left eye every 4 (four) hours while awake for 5 days, Disp: 5 mL, Rfl: 0  •  albuterol (ProAir HFA) 90 mcg/act inhaler, Inhale 2 puffs every 4 hours by inhalation route for 30 days.  (Patient not taking: Reported on 8/10/2023), Disp: , Rfl:     Current Allergies     Allergies as of 08/10/2023   • (No Known Allergies)            The following portions of the patient's history were reviewed and updated as appropriate: allergies, current medications, past family history, past medical history, past social history, past surgical history and problem list.     Past Medical History:   Diagnosis Date   • ADHD    • Anxiety    • Arthritis    • Asthma    • Breast cancer ([address] [facility]) 01/01/2012   • Cancer ([address] [facility]) 08/2012    DCIS-right breast   • Closed fracture of radial styloid    • CPAP (continuous positive airway pressure) dependence    • Endometriosis    • Family history of colon cancer in mother    • Forceps delivery     1991 daughter   • GERD (gastroesophageal reflux disease)    • H/O mitral valve prolapse     no regurgitation   • Hiatal hernia    • History of radiation therapy    • Hyperlipidemia    • Infertility, female    • Normal delivery     1998 daughter   • Sleep apnea     on cpap   • TB lung, latent     treated with INH (in her 29's)       Past Surgical History:   Procedure Laterality Date   • BREAST BIOPSY Right 04/01/2012    biopsy breast percutaneous needle core   • BREAST LUMPECTOMY Right 08/01/2012   • CHOLECYSTECTOMY  01/23/2013   • COLONOSCOPY     • DILATION AND CURETTAGE OF UTERUS     • ENDOMETRIAL BIOPSY      without cervical dilation   • KNEE ARTHROSCOPY      Plica syndrome   • LAPAROSCOPY      Exploratory 1990 & 1994   • SALUD-EN-Y PROCEDURE  01/23/2013    Dr Bill Gordon   • UPPER GASTROINTESTINAL ENDOSCOPY     • US GUIDANCE  5/14/2013       Family History   Problem Relation Age of Onset   • Colon cancer Mother 48   • Cancer Mother         colon   • Osteoporosis Mother    • Stroke Father    • Hypertension Father    • Cancer Sister 70        spinal cancer   • Kidney cancer Sister 79   • No Known Problems Daughter    • No Known Problems Daughter    • No Known Problems Maternal Grandmother    • Colon cancer Maternal Grandfather 80   • Cancer Maternal Grandfather         colon   • Hypertension Paternal Grandmother    • Stroke Paternal Grandmother    • No Known Problems Paternal Grandfather    • No Known Problems Paternal Aunt    • Hypertension Family    • Mitral valve prolapse Family    • Osteoporosis Family    • Varicose Veins Family          Medications have been verified. Objective   /62   Pulse 81   Resp 16   Ht 5' (1.524 m)   Wt 67.1 kg (148 lb)   BMI 28.90 kg/m²        Physical Exam     Physical Exam  Vitals and nursing note reviewed. Constitutional:       General: She is awake. She is not in acute distress. Appearance: Normal appearance. She is well-developed and well-groomed. She is not ill-appearing, toxic-appearing or diaphoretic. HENT:      Head: Normocephalic and atraumatic. Right Ear: Hearing and external ear normal.      Left Ear: Hearing and external ear normal.   Eyes:      General: Lids are normal. Vision grossly intact. Gaze aligned appropriately.       Extraocular Movements: Extraocular movements intact. Conjunctiva/sclera:      Left eye: Left conjunctiva is injected. No chemosis, exudate or hemorrhage. Pupils: Pupils are equal, round, and reactive to light. Pupils are equal.      Right eye: Pupil is round, reactive and not sluggish. No corneal abrasion. Left eye: Pupil is round, reactive and not sluggish. No corneal abrasion. Funduscopic exam:     Right eye: No hemorrhage, exudate, AV nicking, arteriolar narrowing or papilledema. Red reflex and venous pulsations present. Left eye: No hemorrhage, exudate, AV nicking, arteriolar narrowing or papilledema. Red reflex and venous pulsations present. Cardiovascular:      Rate and Rhythm: Normal rate. Pulmonary:      Effort: Pulmonary effort is normal.      Comments: Patient is speaking in full sentences with no increased respiratory effort. No audible wheezing or stridor. Musculoskeletal:      Cervical back: Normal range of motion. Skin:     General: Skin is warm and dry. Neurological:      Mental Status: She is alert and oriented to person, place, and time. Coordination: Coordination is intact. Gait: Gait is intact. Psychiatric:         Attention and Perception: Attention and perception normal.         Mood and Affect: Mood and affect normal.         Speech: Speech normal.         Behavior: Behavior normal. Behavior is cooperative.

## 2023-08-21 ENCOUNTER — OFFICE VISIT (OUTPATIENT)
Dept: FAMILY MEDICINE CLINIC | Facility: CLINIC | Age: 63
End: 2023-08-21
Payer: COMMERCIAL

## 2023-08-21 VITALS
WEIGHT: 145 LBS | SYSTOLIC BLOOD PRESSURE: 108 MMHG | HEART RATE: 60 BPM | BODY MASS INDEX: 28.47 KG/M2 | TEMPERATURE: 97.6 F | DIASTOLIC BLOOD PRESSURE: 60 MMHG | OXYGEN SATURATION: 96 % | HEIGHT: 60 IN | RESPIRATION RATE: 14 BRPM

## 2023-08-21 DIAGNOSIS — F90.9 ATTENTION DEFICIT HYPERACTIVITY DISORDER (ADHD), UNSPECIFIED ADHD TYPE: Primary | ICD-10-CM

## 2023-08-21 DIAGNOSIS — H65.21 SIMPLE CHRONIC SEROUS OTITIS MEDIA, RIGHT: ICD-10-CM

## 2023-08-21 DIAGNOSIS — H69.81 DYSFUNCTION OF EUSTACHIAN TUBE, RIGHT: ICD-10-CM

## 2023-08-21 DIAGNOSIS — F41.8 MIXED ANXIETY AND DEPRESSIVE DISORDER: ICD-10-CM

## 2023-08-21 PROCEDURE — 99214 OFFICE O/P EST MOD 30 MIN: CPT | Performed by: FAMILY MEDICINE

## 2023-08-21 RX ORDER — DEXTROAMPHETAMINE SACCHARATE, AMPHETAMINE ASPARTATE MONOHYDRATE, DEXTROAMPHETAMINE SULFATE AND AMPHETAMINE SULFATE 5; 5; 5; 5 MG/1; MG/1; MG/1; MG/1
20 CAPSULE, EXTENDED RELEASE ORAL EVERY MORNING
Qty: 90 CAPSULE | Refills: 0 | Status: SHIPPED | OUTPATIENT
Start: 2023-08-21

## 2023-08-21 RX ORDER — SERTRALINE HYDROCHLORIDE 100 MG/1
100 TABLET, FILM COATED ORAL
Qty: 90 TABLET | Refills: 2 | Status: SHIPPED | OUTPATIENT
Start: 2023-08-21

## 2023-08-21 RX ORDER — DEXTROAMPHETAMINE SACCHARATE, AMPHETAMINE ASPARTATE, DEXTROAMPHETAMINE SULFATE AND AMPHETAMINE SULFATE 2.5; 2.5; 2.5; 2.5 MG/1; MG/1; MG/1; MG/1
10 TABLET ORAL DAILY
Qty: 90 TABLET | Refills: 0 | Status: SHIPPED | OUTPATIENT
Start: 2023-08-21

## 2023-08-21 NOTE — PROGRESS NOTES
Subjective:      Patient ID: Zuri Florez is a 58 y.o. female. Here for follow up -  Hyperlipidemia: following dietary instructions, trying to lose weight,  Depression and anxiety- Stress level has been ok on zoloft 100 mg qd, mainly related to daughter and was understaffed at work  ADHD-   Pt takes adderall XR 20 mg qd and adderall 10 mg as needed once a day mainly in afternoon. FREIDA  Parathyroid lesion -3 mm, saw endocrine, monitoring PTH, VIT d and calcium  Osteoporosis- started on weekly alendronate , denies any side effects, taking calcium and vit d supplements  History of gastric bypass- taking multivitamin supplements   No cp/sob. No headaches. No GI upset or insomnia. No palpitations. . Needs refills. No pain or new c/o.    Has right ear fullness, history of allergies, feels it improves on Fexofenidine 180 mg daily      Past Medical History:   Diagnosis Date   • ADHD    • Anxiety    • Arthritis    • Asthma    • Breast cancer ([address]) 01/01/2012   • Cancer ([address]) 08/2012    DCIS-right breast   • Closed fracture of radial styloid    • CPAP (continuous positive airway pressure) dependence    • Endometriosis    • Family history of colon cancer in mother    • Forceps delivery     1991 daughter   • GERD (gastroesophageal reflux disease)    • H/O mitral valve prolapse     no regurgitation   • Hiatal hernia    • History of radiation therapy    • Hyperlipidemia    • Infertility, female    • Normal delivery     1998 daughter   • Sleep apnea     on cpap   • TB lung, latent     treated with INH (in her 29's)       Family History   Problem Relation Age of Onset   • Colon cancer Mother 48   • Cancer Mother         colon   • Osteoporosis Mother    • Stroke Father    • Hypertension Father    • Cancer Sister 70        spinal cancer   • Kidney cancer Sister 79   • No Known Problems Daughter    • No Known Problems Daughter    • No Known Problems Maternal Grandmother    • Colon cancer Maternal Grandfather 80   • Cancer Maternal Grandfather         colon   • Hypertension Paternal Grandmother    • Stroke Paternal Grandmother    • No Known Problems Paternal Grandfather    • No Known Problems Paternal Aunt    • Hypertension Family    • Mitral valve prolapse Family    • Osteoporosis Family    • Varicose Veins Family        Past Surgical History:   Procedure Laterality Date   • BREAST BIOPSY Right 04/01/2012    biopsy breast percutaneous needle core   • BREAST LUMPECTOMY Right 08/01/2012   • CHOLECYSTECTOMY  01/23/2013   • COLONOSCOPY     • DILATION AND CURETTAGE OF UTERUS     • ENDOMETRIAL BIOPSY      without cervical dilation   • KNEE ARTHROSCOPY      Plica syndrome   • LAPAROSCOPY      Exploratory 1990 & 1994   • SALUD-EN-Y PROCEDURE  01/23/2013    Dr Shah Livers   • UPPER GASTROINTESTINAL ENDOSCOPY     • US GUIDANCE  5/14/2013        reports that she has never smoked. She has never used smokeless tobacco. She reports current alcohol use. She reports that she does not use drugs.       Current Outpatient Medications:   •  alendronate (FOSAMAX) 70 mg tablet, Take once every week on empty stomach with water and stay upright for 1/2 hour after taking the medication, Disp: 12 tablet, Rfl: 1  •  amphetamine-dextroamphetamine (ADDERALL XR, 20MG,) 20 MG 24 hr capsule, Take 1 capsule (20 mg total) by mouth every morning Max Daily Amount: 20 mg, Disp: 90 capsule, Rfl: 0  •  amphetamine-dextroamphetamine (ADDERALL, 10MG,) 10 mg tablet, Take 1 tablet (10 mg total) by mouth daily In afternoon as needed in addition to your XR 20 mg in AM Max Daily Amount: 10 mg, Disp: 90 tablet, Rfl: 0  •  calcium carbonate (OS-IVONE) 600 MG tablet, Take 600 mg by mouth 2 (two) times a day with meals, Disp: , Rfl:   •  Cholecalciferol (VITAMIN D) 2000 units CAPS, Take by mouth Take 5000IU daily, Disp: , Rfl:   •  Multiple Vitamin (MULTI-VITAMIN DAILY) TABS, Take by mouth, Disp: , Rfl:   •  sertraline (ZOLOFT) 100 mg tablet, Take 1 tablet (100 mg total) by mouth daily at bedtime, Disp: 90 tablet, Rfl: 2    The following portions of the patient's history were reviewed and updated as appropriate: allergies, current medications, past family history, past medical history, past social history, past surgical history and problem list.    Review of Systems   Constitutional: Negative for fatigue and fever. HENT: Negative for congestion, ear discharge, ear pain, facial swelling, mouth sores, rhinorrhea, sore throat, tinnitus and trouble swallowing. Eyes: Negative for pain and redness. Respiratory: Negative for cough, shortness of breath and wheezing. Cardiovascular: Negative for chest pain, palpitations and leg swelling. Gastrointestinal: Negative for abdominal pain, blood in stool, constipation, diarrhea and nausea. Genitourinary: Negative for dysuria, hematuria and urgency. Musculoskeletal: Negative for arthralgias, back pain and myalgias. Skin: Negative for rash and wound. Neurological: Negative for seizures, syncope and headaches. Hematological: Negative for adenopathy. Psychiatric/Behavioral: Negative for agitation and behavioral problems. PHQ-2/9 Depression Screening    Little interest or pleasure in doing things: 0 - not at all  Feeling down, depressed, or hopeless: 0 - not at all  Trouble falling or staying asleep, or sleeping too much: 0 - not at all  Feeling tired or having little energy: 0 - not at all  Poor appetite or overeatin - not at all  Feeling bad about yourself - or that you are a failure or have let yourself or your family down: 0 - not at all  Trouble concentrating on things, such as reading the newspaper or watching television: 0 - not at all  Moving or speaking so slowly that other people could have noticed.  Or the opposite - being so fidgety or restless that you have been moving around a lot more than usual: 0 - not at all  Thoughts that you would be better off dead, or of hurting yourself in some way: 0 - not at all  PHQ-9 Score: 0   PHQ-9 Interpretation: No or Minimal depression              Objective:    /60 (BP Location: Left arm, Patient Position: Sitting, Cuff Size: Adult)   Pulse 60   Temp 97.6 °F (36.4 °C) (Tympanic)   Resp 14   Ht 5' (1.524 m)   Wt 65.8 kg (145 lb)   SpO2 96%   BMI 28.32 kg/m²      Physical Exam  Vitals and nursing note reviewed. Constitutional:       Appearance: Normal appearance. She is well-developed. She is obese. HENT:      Head: Normocephalic and atraumatic. Right Ear: Ear canal and external ear normal. A middle ear effusion is present. Tympanic membrane is not injected, scarred, perforated or erythematous. Left Ear: Ear canal and external ear normal. Tympanic membrane is scarred. Tympanic membrane is not injected, perforated or erythematous. Ears:      Comments: Right-Air fluid level, serous effusion     Nose: Nose normal.   Eyes:      General: No scleral icterus. Right eye: No discharge. Left eye: No discharge. Conjunctiva/sclera: Conjunctivae normal.      Pupils: Pupils are equal, round, and reactive to light. Neck:      Thyroid: No thyromegaly. Cardiovascular:      Rate and Rhythm: Normal rate and regular rhythm. Heart sounds: Normal heart sounds. No murmur heard. No gallop. Pulmonary:      Effort: Pulmonary effort is normal.      Breath sounds: Normal breath sounds. No wheezing or rales. Abdominal:      General: There is no distension. Palpations: Abdomen is soft. Tenderness: There is no abdominal tenderness. Musculoskeletal:         General: No tenderness or deformity. Cervical back: Normal range of motion and neck supple. Lymphadenopathy:      Cervical: No cervical adenopathy. Skin:     Findings: No erythema or rash. Neurological:      General: No focal deficit present. Mental Status: She is alert. Mental status is at baseline.    Psychiatric:         Mood and Affect: Mood normal.         Behavior: Behavior normal. Recent Results (from the past 8736 hour(s))   Protein electrophoresis, urine    Collection Time: 11/08/22 11:08 AM   Result Value Ref Range    Urine Protein 10.0 2.0 - 17.5 mg/dL    Albumin ELP, Urine 100.0 %    Alpha 1, Urine 0.0 %    Alpha 2, Urine 0.0 %    Beta, Urine 0.0 %    Gamma Globulin, Urine 0.0 %    UPEP Interp See Comment    NTX Panel, Urine    Collection Time: 11/08/22 11:08 AM   Result Value Ref Range    N-TELEO URINE 822 Not Estab. nmol BCE    Creatinine, Urine 133.5 Not Estab. mg/dL    N-telo/Creat.  Ratio 70 0 - 89 nM BCE/mM Cr    Interpretive Guide: Comment    Comprehensive metabolic panel    Collection Time: 11/08/22 11:08 AM   Result Value Ref Range    Sodium 139 135 - 147 mmol/L    Potassium 3.7 3.5 - 5.3 mmol/L    Chloride 106 96 - 108 mmol/L    CO2 27 21 - 32 mmol/L    ANION GAP 6 4 - 13 mmol/L    BUN 18 5 - 25 mg/dL    Creatinine 0.71 0.60 - 1.30 mg/dL    Glucose, Fasting 83 65 - 99 mg/dL    Calcium 9.3 8.3 - 10.1 mg/dL    AST 22 5 - 45 U/L    ALT 32 12 - 78 U/L    Alkaline Phosphatase 112 46 - 116 U/L    Total Protein 7.6 6.4 - 8.4 g/dL    Albumin 3.9 3.5 - 5.0 g/dL    Total Bilirubin 0.72 0.20 - 1.00 mg/dL    eGFR 90 ml/min/1.73sq m   Phosphorus    Collection Time: 11/08/22 11:08 AM   Result Value Ref Range    Phosphorus 3.4 2.3 - 4.1 mg/dL   PTH, intact    Collection Time: 11/08/22 11:08 AM   Result Value Ref Range    PTH 63.7 18.4 - 80.1 pg/mL   Protein electrophoresis, serum    Collection Time: 11/08/22 11:08 AM   Result Value Ref Range    A/G Ratio      Total Protein 7.1 6.4 - 8.2 g/dL    SPEP Interpretation     C-Telopeptide    Collection Time: 11/08/22 11:08 AM   Result Value Ref Range    C-TELOPEPTIDE,SERUM 531 pg/mL   TSH, 3rd generation    Collection Time: 11/08/22 11:08 AM   Result Value Ref Range    TSH 3RD GENERATON 3.950 0.450 - 4.500 uIU/mL   T4, free    Collection Time: 11/08/22 11:08 AM   Result Value Ref Range    Free T4 0.80 0.76 - 1.46 ng/dL   Vitamin D 25 hydroxy Collection Time: 11/08/22 11:08 AM   Result Value Ref Range    Vit D, 25-Hydroxy 61.9 30.0 - 100.0 ng/mL   Lipid panel    Collection Time: 04/04/23  2:58 PM   Result Value Ref Range    Cholesterol 211 (H) See Comment mg/dL    Triglycerides 112 See Comment mg/dL    HDL, Direct 60 >=50 mg/dL    LDL Calculated 129 (H) 0 - 100 mg/dL    Non-HDL-Chol (CHOL-HDL) 151 mg/dl   Hemoglobin A1C    Collection Time: 04/04/23  2:58 PM   Result Value Ref Range    Hemoglobin A1C 5.2 Normal 3.8-5.6%; PreDiabetic 5.7-6.4%; Diabetic >=6.5%; Glycemic control for adults with diabetes <7.0% %     mg/dl   CBC and differential    Collection Time: 05/13/23  9:52 AM   Result Value Ref Range    WBC 6.66 4.31 - 10.16 Thousand/uL    RBC 4.52 3.81 - 5.12 Million/uL    Hemoglobin 13.5 11.5 - 15.4 g/dL    Hematocrit 42.7 34.8 - 46.1 %    MCV 95 82 - 98 fL    MCH 29.9 26.8 - 34.3 pg    MCHC 31.6 31.4 - 37.4 g/dL    RDW 12.8 11.6 - 15.1 %    MPV 10.0 8.9 - 12.7 fL    Platelets 220 750 - 702 Thousands/uL    nRBC 0 /100 WBCs    Neutrophils Relative 61 43 - 75 %    Immat GRANS % 0 0 - 2 %    Lymphocytes Relative 30 14 - 44 %    Monocytes Relative 8 4 - 12 %    Eosinophils Relative 1 0 - 6 %    Basophils Relative 0 0 - 1 %    Neutrophils Absolute 4.07 1.85 - 7.62 Thousands/µL    Immature Grans Absolute 0.02 0.00 - 0.20 Thousand/uL    Lymphocytes Absolute 1.99 0.60 - 4.47 Thousands/µL    Monocytes Absolute 0.51 0.17 - 1.22 Thousand/µL    Eosinophils Absolute 0.05 0.00 - 0.61 Thousand/µL    Basophils Absolute 0.02 0.00 - 0.10 Thousands/µL   Lipid panel    Collection Time: 05/13/23  9:52 AM   Result Value Ref Range    Cholesterol 219 (H) See Comment mg/dL    Triglycerides 77 See Comment mg/dL    HDL, Direct 60 >=50 mg/dL    LDL Calculated 144 (H) 0 - 100 mg/dL    Non-HDL-Chol (CHOL-HDL) 159 mg/dl   Hemoglobin A1C    Collection Time: 05/13/23  9:52 AM   Result Value Ref Range    Hemoglobin A1C 5.0 Normal 3.8-5.6%; PreDiabetic 5.7-6.4%;  Diabetic >=6.5%; Glycemic control for adults with diabetes <7.0% %    EAG 97 mg/dl       Laboratory Results: I have personally reviewed the pertinent laboratory results/reports     Radiology/Other Diagnostic Testing Results: I have personally reviewed pertinent reports. DXA bone density spine hip and pelvis    Result Date: 7/7/2023  DXA SCAN CLINICAL HISTORY: 58 years postmenopausal female. OTHER RISK FACTORS: Gastric bypass surgery, SSRI therapy. PHARMACOLOGIC THERAPY FOR OSTEOPOROSIS: Fosamax. TECHNIQUE: Bone densitometry was performed using a Hologic Horizon A bone densitometer. Regions of interest appear properly placed. COMPARISON: 5/21/2021. RESULTS: LUMBAR SPINE Level: L2-L4  (L1 vertebra excluded from analysis due to local structural abnormalities or artifact): BMD: 0.700 gm/cm2 T-score: -3.4 LEFT  TOTAL HIP: BMD: 0.696 gm/cm2 T-score: -2.0 LEFT  FEMORAL NECK: BMD: 0.468 gm/cm2 T score: -3.4     1. Osteoporosis. 2.  Since a DXA study from 5/21/2021, there has been: A  STATISTICALLY SIGNIFICANT INCREASE in bone mineral density of 0.054 g/cm2 (8.5%) in the left total hip. 3.  The 10 year risk of hip fracture is 6.2% with the 10 year risk of major osteoporotic fracture being 18% as calculated by the [facility] [place] fracture risk assessment tool (FRAX, which is based on data generated by the [facility]  for Metabolic Bone Diseases). 4.  The current NOF guidelines recommend treating patients with a T-score of -2.5 or less in the lumbar spine or hips, or in post-menopausal women and men over the age of 48 with low bone mass (osteopenia) and a FRAX 10 year risk score of >3% for hip fracture and/or >20% for major osteoporotic fracture. 5.  The NOF recommends follow-up DXA in 1-2 years after initiating therapy for osteoporosis and every 2 years thereafter. More frequent evaluation is appropriate for patients with conditions associated with rapid bone loss, such as glucocorticoid therapy.  The interval between DXA screenings may be longer for individuals without major risk factors and initial T-score in the normal or upper low bone mass range. The FRAX algorithm has certain limitations: -FRAX has not been validated in patients currently or previously treated with pharmacotherapy for osteoporosis. In such patients, clinical judgment must be exercised in interpreting FRAX scores. -Prior hip, vertebral and humeral fragility fractures appear to confer greater risk of subsequent fracture than fractures at other sites (this is especially true for individuals with severe vertebral fractures), but quantification of this incremental risk is not possible with FRAX. -FRAX underestimates fracture risk in patients with history of multiple fragility fractures. -FRAX may underestimate fracture risk in patients with history of frequent falls. -It is not appropriate to use FRAX to monitor treatment response. WHO CLASSIFICATION: Normal (a T-score of -1.0 or higher) Low bone mineral density (a T-score of less than -1.0 but higher than -2.5) Osteoporosis (a T-score of -2.5 or less) Severe osteoporosis (a T-score of -2.5 or less with a fragility fracture) LEAST SIGNIFICANT CHANGE (AT 95% C. I): Lumbar spine 0.036 g/cm2; 2.2% Total hip: 0.022 g/cm2; 2.6% Forearm: 0.017 g/cm2; 3.0%.  Workstation performed: [id number]        Assessment/Plan:  Problem List Items Addressed This Visit        Nervous and Auditory    Simple chronic serous otitis media, right       Other    Mixed anxiety and depressive disorder    Relevant Medications    amphetamine-dextroamphetamine (ADDERALL, 10MG,) 10 mg tablet    amphetamine-dextroamphetamine (ADDERALL XR, 20MG,) 20 MG 24 hr capsule    sertraline (ZOLOFT) 100 mg tablet    ADHD - Primary    Relevant Medications    amphetamine-dextroamphetamine (ADDERALL, 10MG,) 10 mg tablet    amphetamine-dextroamphetamine (ADDERALL XR, 20MG,) 20 MG 24 hr capsule    sertraline (ZOLOFT) 100 mg tablet   Other Visit Diagnoses Dysfunction of Eustachian tube, right              Continue current medications  Continue allegra,   Reviewed and discussed labs   She does have f/u with gyn for PAP testing for cervical cancer screening scheduled  F/u in 3months             Read package inserts for all medications before starting a new medications, call me if you have any questions. Patient was given opportunity to ask questions and all questions were answered. Disclaimer: Portions of the record may have been created with voice recognition software. Occasional wrong word or "sound a like" substitutions may have occurred due to the inherent limitations of voice recognition software. Read the chart carefully and recognize, using context, where substitutions have occurred. I have used the Epic copy/forward function to compose this note. I have reviewed my current note to ensure it reflects the current patient status, exam, assessment and plan.

## 2023-08-22 PROBLEM — H65.21: Status: ACTIVE | Noted: 2023-08-22

## 2023-11-13 DIAGNOSIS — M81.8 OTHER OSTEOPOROSIS WITHOUT CURRENT PATHOLOGICAL FRACTURE: ICD-10-CM

## 2023-11-13 RX ORDER — ALENDRONATE SODIUM 70 MG/1
TABLET ORAL
Qty: 12 TABLET | Refills: 0 | Status: SHIPPED | OUTPATIENT
Start: 2023-11-13

## 2023-11-29 ENCOUNTER — APPOINTMENT (OUTPATIENT)
Dept: LAB | Age: 63
End: 2023-11-29
Payer: COMMERCIAL

## 2023-11-29 DIAGNOSIS — F90.9 ATTENTION DEFICIT HYPERACTIVITY DISORDER (ADHD), UNSPECIFIED ADHD TYPE: ICD-10-CM

## 2023-11-29 LAB
ALBUMIN SERPL BCP-MCNC: 4.3 G/DL (ref 3.5–5)
ALP SERPL-CCNC: 48 U/L (ref 34–104)
ALT SERPL W P-5'-P-CCNC: 16 U/L (ref 7–52)
ANION GAP SERPL CALCULATED.3IONS-SCNC: 6 MMOL/L
AST SERPL W P-5'-P-CCNC: 17 U/L (ref 13–39)
BILIRUB SERPL-MCNC: 0.57 MG/DL (ref 0.2–1)
BUN SERPL-MCNC: 20 MG/DL (ref 5–25)
CALCIUM SERPL-MCNC: 8.9 MG/DL (ref 8.4–10.2)
CHLORIDE SERPL-SCNC: 107 MMOL/L (ref 96–108)
CO2 SERPL-SCNC: 26 MMOL/L (ref 21–32)
CREAT SERPL-MCNC: 0.74 MG/DL (ref 0.6–1.3)
GFR SERPL CREATININE-BSD FRML MDRD: 85 ML/MIN/1.73SQ M
GLUCOSE P FAST SERPL-MCNC: 89 MG/DL (ref 65–99)
POTASSIUM SERPL-SCNC: 4 MMOL/L (ref 3.5–5.3)
PROT SERPL-MCNC: 6.8 G/DL (ref 6.4–8.4)
SODIUM SERPL-SCNC: 139 MMOL/L (ref 135–147)

## 2023-11-29 PROCEDURE — 36415 COLL VENOUS BLD VENIPUNCTURE: CPT

## 2023-11-29 PROCEDURE — 80053 COMPREHEN METABOLIC PANEL: CPT

## 2023-12-11 ENCOUNTER — OFFICE VISIT (OUTPATIENT)
Dept: FAMILY MEDICINE CLINIC | Facility: CLINIC | Age: 63
End: 2023-12-11
Payer: COMMERCIAL

## 2023-12-11 VITALS
SYSTOLIC BLOOD PRESSURE: 100 MMHG | HEIGHT: 60 IN | TEMPERATURE: 97.9 F | OXYGEN SATURATION: 98 % | DIASTOLIC BLOOD PRESSURE: 60 MMHG | RESPIRATION RATE: 14 BRPM | HEART RATE: 80 BPM | WEIGHT: 140 LBS | BODY MASS INDEX: 27.48 KG/M2

## 2023-12-11 DIAGNOSIS — F41.8 MIXED ANXIETY AND DEPRESSIVE DISORDER: ICD-10-CM

## 2023-12-11 DIAGNOSIS — E66.3 OVERWEIGHT WITH BODY MASS INDEX (BMI) OF 27 TO 27.9 IN ADULT: Primary | ICD-10-CM

## 2023-12-11 DIAGNOSIS — F90.9 ATTENTION DEFICIT HYPERACTIVITY DISORDER (ADHD), UNSPECIFIED ADHD TYPE: ICD-10-CM

## 2023-12-11 PROCEDURE — 99214 OFFICE O/P EST MOD 30 MIN: CPT | Performed by: FAMILY MEDICINE

## 2023-12-11 RX ORDER — DEXTROAMPHETAMINE SACCHARATE, AMPHETAMINE ASPARTATE MONOHYDRATE, DEXTROAMPHETAMINE SULFATE AND AMPHETAMINE SULFATE 5; 5; 5; 5 MG/1; MG/1; MG/1; MG/1
20 CAPSULE, EXTENDED RELEASE ORAL EVERY MORNING
Qty: 90 CAPSULE | Refills: 0 | Status: SHIPPED | OUTPATIENT
Start: 2023-12-11

## 2023-12-11 RX ORDER — SERTRALINE HYDROCHLORIDE 100 MG/1
100 TABLET, FILM COATED ORAL
Qty: 90 TABLET | Refills: 2 | Status: SHIPPED | OUTPATIENT
Start: 2023-12-11

## 2023-12-11 NOTE — PROGRESS NOTES
Subjective:      Patient ID: Jessica Buchanan is a 58 y.o. female. Here for follow up -  Hyperlipidemia: following dietary instructions, trying to lose weight,  Depression and anxiety- Stress level has been ok on zoloft 100 mg qd, mainly related to daughter ,feels sometimes it does not work, she does notice she starts crying randomly if she missed a dose or even if a stressor occurs  ADHD-   Pt takes adderall XR 20 mg qd and adderall 10 mg gives her headaches and she has stopped taking it, she feels that her focusing difficulty are more overall, but no complains or errors at work. Wants to see if increasing to 30 xr would be   FREIDA  Parathyroid lesion -3 mm, saw endocrine, monitoring PTH, VIT d and calcium  Osteoporosis- started on weekly alendronate , denies any side effects, taking calcium and vit d supplements  History of gastric bypass- taking multivitamin supplements   No cp/sob. No headaches. No GI upset or insomnia. No palpitations. Needs refills. No pain or new c/o.            Past Medical History:   Diagnosis Date    ADHD     Anxiety     Arthritis     Asthma     Breast cancer ([address]) 01/01/2012    Cancer ([address]) 08/2012    DCIS-right breast    Closed fracture of radial styloid     CPAP (continuous positive airway pressure) dependence     Endometriosis     Family history of colon cancer in mother     Forceps delivery     1991 daughter    GERD (gastroesophageal reflux disease)     H/O mitral valve prolapse     no regurgitation    Hiatal hernia     History of radiation therapy     Hyperlipidemia     Infertility, female     Normal delivery     1998 daughter    Sleep apnea     on cpap    TB lung, latent     treated with INH (in her 29's)       Family History   Problem Relation Age of Onset    Colon cancer Mother 48    Cancer Mother         colon    Osteoporosis Mother     Stroke Father     Hypertension Father     Cancer Sister 70        spinal cancer    Kidney cancer Sister 79    No Known Problems Daughter     No Known Problems Daughter     No Known Problems Maternal Grandmother     Colon cancer Maternal Grandfather 80    Cancer Maternal Grandfather         colon    Hypertension Paternal Grandmother     Stroke Paternal Grandmother     No Known Problems Paternal Grandfather     No Known Problems Paternal Aunt     Hypertension Family     Mitral valve prolapse Family     Osteoporosis Family     Varicose Veins Family        Past Surgical History:   Procedure Laterality Date    BREAST BIOPSY Right 04/01/2012    biopsy breast percutaneous needle core    BREAST LUMPECTOMY Right 08/01/2012    CHOLECYSTECTOMY  01/23/2013    COLONOSCOPY      DILATION AND CURETTAGE OF UTERUS      ENDOMETRIAL BIOPSY      without cervical dilation    KNEE ARTHROSCOPY      Plica syndrome    LAPAROSCOPY      Exploratory 1990 & 1994    SALUD-EN-Y PROCEDURE  01/23/2013    Dr Gricelda Temple  5/14/2013        reports that she has never smoked. She has never used smokeless tobacco. She reports current alcohol use. She reports that she does not use drugs.       Current Outpatient Medications:     alendronate (FOSAMAX) 70 mg tablet, Take once every week on empty stomach with water and stay upright for 1/2 hour after taking the medication, Disp: 12 tablet, Rfl: 0    amphetamine-dextroamphetamine (ADDERALL XR, 20MG,) 20 MG 24 hr capsule, Take 1 capsule (20 mg total) by mouth every morning Max Daily Amount: 20 mg, Disp: 90 capsule, Rfl: 0    calcium carbonate (OS-IVONE) 600 MG tablet, Take 600 mg by mouth 2 (two) times a day with meals, Disp: , Rfl:     Cholecalciferol (VITAMIN D) 2000 units CAPS, Take by mouth Take 5000IU daily, Disp: , Rfl:     Multiple Vitamin (MULTI-VITAMIN DAILY) TABS, Take by mouth, Disp: , Rfl:     sertraline (ZOLOFT) 100 mg tablet, Take 1 tablet (100 mg total) by mouth daily at bedtime, Disp: 90 tablet, Rfl: 2    The following portions of the patient's history were reviewed and updated as appropriate: allergies, current medications, past family history, past medical history, past social history, past surgical history and problem list.    Review of Systems   Constitutional:  Negative for fatigue and fever. HENT:  Negative for congestion, facial swelling, mouth sores, rhinorrhea, sore throat and trouble swallowing. Eyes:  Negative for pain and redness. Respiratory:  Negative for cough, shortness of breath and wheezing. Cardiovascular:  Negative for chest pain, palpitations and leg swelling. Gastrointestinal:  Negative for abdominal pain, blood in stool, constipation, diarrhea and nausea. Genitourinary:  Negative for dysuria, hematuria and urgency. Musculoskeletal:  Negative for arthralgias, back pain and myalgias. Skin:  Negative for rash and wound. Neurological:  Negative for seizures, syncope and headaches. Hematological:  Negative for adenopathy. Psychiatric/Behavioral:  Positive for decreased concentration. Negative for agitation and behavioral problems. PHQ-2/9 Depression Screening    Little interest or pleasure in doing things: 0 - not at all  Feeling down, depressed, or hopeless: 0 - not at all  Trouble falling or staying asleep, or sleeping too much: 0 - not at all  Feeling tired or having little energy: 0 - not at all  Poor appetite or overeatin - not at all  Feeling bad about yourself - or that you are a failure or have let yourself or your family down: 0 - not at all  Trouble concentrating on things, such as reading the newspaper or watching television: 0 - not at all  Moving or speaking so slowly that other people could have noticed.  Or the opposite - being so fidgety or restless that you have been moving around a lot more than usual: 0 - not at all  Thoughts that you would be better off dead, or of hurting yourself in some way: 0 - not at all  PHQ-9 Score: 0   PHQ-9 Interpretation: No or Minimal depression                Objective:    /60 (BP Location: Left arm, Patient Position: Sitting, Cuff Size: Adult)   Pulse 80   Temp 97.9 °F (36.6 °C) (Tympanic)   Resp 14   Ht 5' (1.524 m)   Wt 63.5 kg (140 lb)   SpO2 98%   BMI 27.34 kg/m²      Physical Exam      Recent Results (from the past 8736 hour(s))   Lipid panel    Collection Time: 04/04/23  2:58 PM   Result Value Ref Range    Cholesterol 211 (H) See Comment mg/dL    Triglycerides 112 See Comment mg/dL    HDL, Direct 60 >=50 mg/dL    LDL Calculated 129 (H) 0 - 100 mg/dL    Non-HDL-Chol (CHOL-HDL) 151 mg/dl   Hemoglobin A1C    Collection Time: 04/04/23  2:58 PM   Result Value Ref Range    Hemoglobin A1C 5.2 Normal 3.8-5.6%; PreDiabetic 5.7-6.4%;  Diabetic >=6.5%; Glycemic control for adults with diabetes <7.0% %     mg/dl   CBC and differential    Collection Time: 05/13/23  9:52 AM   Result Value Ref Range    WBC 6.66 4.31 - 10.16 Thousand/uL    RBC 4.52 3.81 - 5.12 Million/uL    Hemoglobin 13.5 11.5 - 15.4 g/dL    Hematocrit 42.7 34.8 - 46.1 %    MCV 95 82 - 98 fL    MCH 29.9 26.8 - 34.3 pg    MCHC 31.6 31.4 - 37.4 g/dL    RDW 12.8 11.6 - 15.1 %    MPV 10.0 8.9 - 12.7 fL    Platelets 935 483 - 888 Thousands/uL    nRBC 0 /100 WBCs    Neutrophils Relative 61 43 - 75 %    Immat GRANS % 0 0 - 2 %    Lymphocytes Relative 30 14 - 44 %    Monocytes Relative 8 4 - 12 %    Eosinophils Relative 1 0 - 6 %    Basophils Relative 0 0 - 1 %    Neutrophils Absolute 4.07 1.85 - 7.62 Thousands/µL    Immature Grans Absolute 0.02 0.00 - 0.20 Thousand/uL    Lymphocytes Absolute 1.99 0.60 - 4.47 Thousands/µL    Monocytes Absolute 0.51 0.17 - 1.22 Thousand/µL    Eosinophils Absolute 0.05 0.00 - 0.61 Thousand/µL    Basophils Absolute 0.02 0.00 - 0.10 Thousands/µL   Lipid panel    Collection Time: 05/13/23  9:52 AM   Result Value Ref Range    Cholesterol 219 (H) See Comment mg/dL    Triglycerides 77 See Comment mg/dL    HDL, Direct 60 >=50 mg/dL    LDL Calculated 144 (H) 0 - 100 mg/dL    Non-HDL-Chol (CHOL-HDL) 159 mg/dl Hemoglobin A1C    Collection Time: 05/13/23  9:52 AM   Result Value Ref Range    Hemoglobin A1C 5.0 Normal 3.8-5.6%; PreDiabetic 5.7-6.4%; Diabetic >=6.5%; Glycemic control for adults with diabetes <7.0% %    EAG 97 mg/dl   Comprehensive metabolic panel    Collection Time: 11/29/23  8:21 AM   Result Value Ref Range    Sodium 139 135 - 147 mmol/L    Potassium 4.0 3.5 - 5.3 mmol/L    Chloride 107 96 - 108 mmol/L    CO2 26 21 - 32 mmol/L    ANION GAP 6 mmol/L    BUN 20 5 - 25 mg/dL    Creatinine 0.74 0.60 - 1.30 mg/dL    Glucose, Fasting 89 65 - 99 mg/dL    Calcium 8.9 8.4 - 10.2 mg/dL    AST 17 13 - 39 U/L    ALT 16 7 - 52 U/L    Alkaline Phosphatase 48 34 - 104 U/L    Total Protein 6.8 6.4 - 8.4 g/dL    Albumin 4.3 3.5 - 5.0 g/dL    Total Bilirubin 0.57 0.20 - 1.00 mg/dL    eGFR 85 ml/min/1.73sq m       Laboratory Results: I have personally reviewed the pertinent laboratory results/reports     Radiology/Other Diagnostic Testing Results: I have personally reviewed pertinent reports. DXA bone density spine hip and pelvis    Result Date: 7/7/2023  DXA SCAN CLINICAL HISTORY: 58 years postmenopausal female. OTHER RISK FACTORS: Gastric bypass surgery, SSRI therapy. PHARMACOLOGIC THERAPY FOR OSTEOPOROSIS: Fosamax. TECHNIQUE: Bone densitometry was performed using a Hologic Horizon A bone densitometer. Regions of interest appear properly placed. COMPARISON: 5/21/2021. RESULTS: LUMBAR SPINE Level: L2-L4  (L1 vertebra excluded from analysis due to local structural abnormalities or artifact): BMD: 0.700 gm/cm2 T-score: -3.4 LEFT  TOTAL HIP: BMD: 0.696 gm/cm2 T-score: -2.0 LEFT  FEMORAL NECK: BMD: 0.468 gm/cm2 T score: -3.4     1. Osteoporosis. 2.  Since a DXA study from 5/21/2021, there has been: A  STATISTICALLY SIGNIFICANT INCREASE in bone mineral density of 0.054 g/cm2 (8.5%) in the left total hip.  3.  The 10 year risk of hip fracture is 6.2% with the 10 year risk of major osteoporotic fracture being 18% as calculated by the [facility] fracture risk assessment tool (FRAX, which is based on data generated by the [facility]  for Metabolic Bone Diseases). 4.  The current NOF guidelines recommend treating patients with a T-score of -2.5 or less in the lumbar spine or hips, or in post-menopausal women and men over the age of 48 with low bone mass (osteopenia) and a FRAX 10 year risk score of >3% for hip fracture and/or >20% for major osteoporotic fracture. 5.  The NOF recommends follow-up DXA in 1-2 years after initiating therapy for osteoporosis and every 2 years thereafter. More frequent evaluation is appropriate for patients with conditions associated with rapid bone loss, such as glucocorticoid therapy. The interval between DXA screenings may be longer for individuals without major risk factors and initial T-score in the normal or upper low bone mass range. The FRAX algorithm has certain limitations: -FRAX has not been validated in patients currently or previously treated with pharmacotherapy for osteoporosis. In such patients, clinical judgment must be exercised in interpreting FRAX scores. -Prior hip, vertebral and humeral fragility fractures appear to confer greater risk of subsequent fracture than fractures at other sites (this is especially true for individuals with severe vertebral fractures), but quantification of this incremental risk is not possible with FRAX. -FRAX underestimates fracture risk in patients with history of multiple fragility fractures. -FRAX may underestimate fracture risk in patients with history of frequent falls. -It is not appropriate to use FRAX to monitor treatment response. WHO CLASSIFICATION: Normal (a T-score of -1.0 or higher) Low bone mineral density (a T-score of less than -1.0 but higher than -2.5) Osteoporosis (a T-score of -2.5 or less) Severe osteoporosis (a T-score of -2.5 or less with a fragility fracture) LEAST SIGNIFICANT CHANGE (AT 95% C. I): Lumbar spine 0.036 g/cm2; 2.2% Total hip: 0.022 g/cm2; 2.6% Forearm: 0.017 g/cm2; 3.0%. Workstation performed: [id number]        Assessment/Plan:  Problem List Items Addressed This Visit          Other    Mixed anxiety and depressive disorder    Relevant Medications    amphetamine-dextroamphetamine (ADDERALL XR, 20MG,) 20 MG 24 hr capsule    sertraline (ZOLOFT) 100 mg tablet    ADHD    Relevant Medications    amphetamine-dextroamphetamine (ADDERALL XR, 20MG,) 20 MG 24 hr capsule    sertraline (ZOLOFT) 100 mg tablet     Other Visit Diagnoses       Overweight with body mass index (BMI) of 27 to 27.9 in adult    -  Primary                Do not advise increasing dose for Adderral XL , use coping skills, oc since the symptoms are subjective and not objectively reflected in her work, to minimize side effects. Continue zoloft, do not try to stop  Discussed cmp-normal         Read package inserts for all medications before starting a new medications, call me if you have any questions. Patient was given opportunity to ask questions and all questions were answered. Disclaimer: Portions of the record may have been created with voice recognition software. Occasional wrong word or "sound a like" substitutions may have occurred due to the inherent limitations of voice recognition software. Read the chart carefully and recognize, using context, where substitutions have occurred. I have used the Epic copy/forward function to compose this note. I have reviewed my current note to ensure it reflects the current patient status, exam, assessment and plan.

## 2023-12-17 ENCOUNTER — OFFICE VISIT (OUTPATIENT)
Dept: URGENT CARE | Facility: CLINIC | Age: 63
End: 2023-12-17
Payer: COMMERCIAL

## 2023-12-17 VITALS
BODY MASS INDEX: 27.48 KG/M2 | DIASTOLIC BLOOD PRESSURE: 67 MMHG | HEART RATE: 63 BPM | RESPIRATION RATE: 18 BRPM | SYSTOLIC BLOOD PRESSURE: 111 MMHG | TEMPERATURE: 96.5 F | HEIGHT: 60 IN | OXYGEN SATURATION: 99 % | WEIGHT: 140 LBS

## 2023-12-17 DIAGNOSIS — H10.31 ACUTE BACTERIAL CONJUNCTIVITIS OF RIGHT EYE: Primary | ICD-10-CM

## 2023-12-17 PROCEDURE — 99213 OFFICE O/P EST LOW 20 MIN: CPT | Performed by: NURSE PRACTITIONER

## 2023-12-17 RX ORDER — OFLOXACIN 3 MG/ML
1 SOLUTION/ DROPS OPHTHALMIC 4 TIMES DAILY
Qty: 5 ML | Refills: 0 | Status: SHIPPED | OUTPATIENT
Start: 2023-12-17

## 2023-12-17 NOTE — PROGRESS NOTES
[facility]        NAME: Lydia Orellana is a 63 y.o. female  : 1960    MRN: [id number]  DATE: 2023  TIME: 8:39 AM    Assessment and Plan   Acute bacterial conjunctivitis of right eye [H10.31]  1. Acute bacterial conjunctivitis of right eye  ofloxacin (OCUFLOX) 0.3 % ophthalmic solution            Patient Instructions     --Instill Rx antibiotic drops as prescribed x 5-7 days  --Warm compresses to eye  --Avoid contact lens use x 1 week.  Start with new contacts.   --Follow-up with eye doctor for ongoing issues    Chief Complaint     Chief Complaint   Patient presents with    Eye Problem     Swollen itchiness in left eye         History of Present Illness       Here with complaints of right eye irritation, mild itching, mild redness/lid swelling x 1 week.    Contact lens user.  Has had them out for part of the week.  Changes regularly.    No associated URI/allergy symptoms, photophobia, FB sensation.    Left eye feels fine.            Review of Systems   Review of Systems   Constitutional:  Positive for fever.   Eyes:  Positive for discharge, redness and itching. Negative for photophobia.         Current Medications       Current Outpatient Medications:     ofloxacin (OCUFLOX) 0.3 % ophthalmic solution, Administer 1 drop to the right eye 4 (four) times a day, Disp: 5 mL, Rfl: 0    alendronate (FOSAMAX) 70 mg tablet, Take once every week on empty stomach with water and stay upright for 1/2 hour after taking the medication, Disp: 12 tablet, Rfl: 0    amphetamine-dextroamphetamine (ADDERALL XR, 20MG,) 20 MG 24 hr capsule, Take 1 capsule (20 mg total) by mouth every morning Max Daily Amount: 20 mg, Disp: 90 capsule, Rfl: 0    calcium carbonate (OS-IVONE) 600 MG tablet, Take 600 mg by mouth 2 (two) times a day with meals, Disp: , Rfl:     Cholecalciferol (VITAMIN D) 2000 units CAPS, Take by mouth Take 5000IU daily, Disp: , Rfl:     Multiple Vitamin (MULTI-VITAMIN DAILY) TABS, Take by mouth, Disp:  , Rfl:     sertraline (ZOLOFT) 100 mg tablet, Take 1 tablet (100 mg total) by mouth daily at bedtime, Disp: 90 tablet, Rfl: 2    Current Allergies     Allergies as of 12/17/2023    (No Known Allergies)            The following portions of the patient's history were reviewed and updated as appropriate: allergies, current medications, past family history, past medical history, past social history, past surgical history and problem list.     Past Medical History:   Diagnosis Date    ADHD     Anxiety     Arthritis     Asthma     Breast cancer (HCC) 01/01/2012    Cancer (HCC) 08/2012    DCIS-right breast    Closed fracture of radial styloid     CPAP (continuous positive airway pressure) dependence     Endometriosis     Family history of colon cancer in mother     Forceps delivery     1991 daughter    GERD (gastroesophageal reflux disease)     H/O mitral valve prolapse     no regurgitation    Hiatal hernia     History of radiation therapy     Hyperlipidemia     Infertility, female     Normal delivery     1998 daughter    Sleep apnea     on cpap    TB lung, latent     treated with INH (in her 30's)       Past Surgical History:   Procedure Laterality Date    BREAST BIOPSY Right 04/01/2012    biopsy breast percutaneous needle core    BREAST LUMPECTOMY Right 08/01/2012    CHOLECYSTECTOMY  01/23/2013    COLONOSCOPY      DILATION AND CURETTAGE OF UTERUS      ENDOMETRIAL BIOPSY      without cervical dilation    KNEE ARTHROSCOPY      Plica syndrome    LAPAROSCOPY      Exploratory 1990 & 1994    SALUD-EN-Y PROCEDURE  01/23/2013    Dr Crawford    UPPER GASTROINTESTINAL ENDOSCOPY      US GUIDANCE  5/14/2013       Family History   Problem Relation Age of Onset    Colon cancer Mother 50    Cancer Mother         colon    Osteoporosis Mother     Stroke Father     Hypertension Father     Cancer Sister 71        spinal cancer    Kidney cancer Sister 70    No Known Problems Daughter     No Known Problems Daughter     No Known Problems  Maternal Grandmother     Colon cancer Maternal Grandfather 80    Cancer Maternal Grandfather         colon    Hypertension Paternal Grandmother     Stroke Paternal Grandmother     No Known Problems Paternal Grandfather     No Known Problems Paternal Aunt     Hypertension Family     Mitral valve prolapse Family     Osteoporosis Family     Varicose Veins Family          Medications have been verified.        Objective   /67   Pulse 63   Temp (!) 96.5 °F (35.8 °C)   Resp 18   Ht 5' (1.524 m)   Wt 63.5 kg (140 lb)   SpO2 99%   BMI 27.34 kg/m²   No LMP recorded. Patient is postmenopausal.       Physical Exam     Physical Exam  Eyes:      General:         Right eye: Discharge present.         Left eye: No discharge.      Extraocular Movements: Extraocular movements intact.      Pupils: Pupils are equal, round, and reactive to light.      Comments: Right eye with mild conjunctival injection, scant, stringy white discharge.  Lids pink, nontender, with mild swelling.    Remainder of eye, including sclera, WNL.   No ciliary flush.   No periorbital tenderness.    Left eye normal.    Pulmonary:      Effort: Pulmonary effort is normal.   Neurological:      Mental Status: She is alert.   Psychiatric:         Mood and Affect: Mood normal.

## 2023-12-17 NOTE — PATIENT INSTRUCTIONS
--Instill Rx antibiotic drops as prescribed x 5-7 days  --Warm compresses to eye  --Avoid contact lens use x 1 week.  Start with new contacts.   --Follow-up with eye doctor for ongoing issues

## 2023-12-18 ENCOUNTER — ANNUAL EXAM (OUTPATIENT)
Dept: OBGYN CLINIC | Facility: CLINIC | Age: 63
End: 2023-12-18
Payer: COMMERCIAL

## 2023-12-18 VITALS — SYSTOLIC BLOOD PRESSURE: 98 MMHG | DIASTOLIC BLOOD PRESSURE: 60 MMHG | BODY MASS INDEX: 27.69 KG/M2 | WEIGHT: 141.8 LBS

## 2023-12-18 DIAGNOSIS — Z12.31 ENCOUNTER FOR SCREENING MAMMOGRAM FOR MALIGNANT NEOPLASM OF BREAST: ICD-10-CM

## 2023-12-18 DIAGNOSIS — Z12.4 ENCOUNTER FOR SCREENING FOR MALIGNANT NEOPLASM OF CERVIX: ICD-10-CM

## 2023-12-18 DIAGNOSIS — Z01.419 WELL WOMAN EXAM WITH ROUTINE GYNECOLOGICAL EXAM: Primary | ICD-10-CM

## 2023-12-18 DIAGNOSIS — Z11.51 SCREENING FOR HPV (HUMAN PAPILLOMAVIRUS): ICD-10-CM

## 2023-12-18 PROCEDURE — G0145 SCR C/V CYTO,THINLAYER,RESCR: HCPCS | Performed by: OBSTETRICS & GYNECOLOGY

## 2023-12-18 PROCEDURE — S0612 ANNUAL GYNECOLOGICAL EXAMINA: HCPCS | Performed by: OBSTETRICS & GYNECOLOGY

## 2023-12-18 PROCEDURE — G0476 HPV COMBO ASSAY CA SCREEN: HCPCS | Performed by: OBSTETRICS & GYNECOLOGY

## 2023-12-18 NOTE — PROGRESS NOTES
ASSESSMENT & PLAN: Lydia Orellana is a 63 y.o.  with normal gynecologic exam.    1.  Routine well woman exam done today.  2.   Pap and HPV:Pap with HPV was done today.  Current ASCCP Guidelines reviewed. Last Pap  [unfilled] :  no abnormalities.  3. Mammogram ordered. Recommend yearly mammography.   4.  Post Menopausal- discussed what to expect.  Encouraged exercise with light weights.    5. The patient is sexually active.   6. The following were reviewed in today's visit: breast self exam, mammography screening ordered, menopause, exercise, and healthy diet.  7. Patient to return to office in 12 months for WWE.     All questions have been answered to her satisfaction.    CC:  Annual Gynecologic Examination    HPI: Lydia Orellana is a 63 y.o.  who presents for annual gynecologic examination.  She has the following concerns:  none.  Denies any PMB.      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    She does perform regular monthly self breast exams.    She feels safe at home.   Patients does follow a healthy diet.      Past Medical History:   Diagnosis Date    ADHD     Anxiety     Arthritis     Asthma     Breast cancer (HCC) 2012    Cancer (HCC) 2012    DCIS-right breast    Closed fracture of radial styloid     CPAP (continuous positive airway pressure) dependence     Endometriosis     Family history of colon cancer in mother     Forceps delivery      daughter    GERD (gastroesophageal reflux disease)     H/O mitral valve prolapse     no regurgitation    Hiatal hernia     History of radiation therapy     Hyperlipidemia     Infertility, female     Normal delivery      daughter    Sleep apnea     on cpap    TB lung, latent     treated with INH (in her 30's)       Past Surgical History:   Procedure Laterality Date    BREAST BIOPSY Right 2012    biopsy breast percutaneous needle core    BREAST LUMPECTOMY Right  2012    CHOLECYSTECTOMY  2013    COLONOSCOPY      DILATION AND CURETTAGE OF UTERUS      ENDOMETRIAL BIOPSY      without cervical dilation    KNEE ARTHROSCOPY      Plica syndrome    LAPAROSCOPY      Exploratory  &     SALUD-EN-Y PROCEDURE  2013    Dr Crawford    UPPER GASTROINTESTINAL ENDOSCOPY      US GUIDANCE  2013       Past OB/Gyn History:   No LMP recorded. Patient is postmenopausal.  Menstrual History:  OB History          2    Para   0    Term   0            AB        Living             SAB        IAB        Ectopic        Multiple        Live Births   2           Obstetric Comments   History of Infertility  Post menopausal early 40s               No LMP recorded. Patient is postmenopausal.       History of sexually transmitted infection No  Patient is currently sexually active.  heterosexual Birth control: none.  Last Pap  [unfilled] :  no abnormalities.    Family History   Problem Relation Age of Onset    Colon cancer Mother 50    Cancer Mother         colon    Osteoporosis Mother     Stroke Father     Hypertension Father     Cancer Sister 71        spinal cancer    Kidney cancer Sister 70    No Known Problems Daughter     No Known Problems Daughter     No Known Problems Maternal Grandmother     Colon cancer Maternal Grandfather 80    Cancer Maternal Grandfather         colon    Hypertension Paternal Grandmother     Stroke Paternal Grandmother     No Known Problems Paternal Grandfather     No Known Problems Paternal Aunt     Hypertension Family     Mitral valve prolapse Family     Osteoporosis Family     Varicose Veins Family        Social History:  Social History     Socioeconomic History    Marital status:      Spouse name: Not on file    Number of children: 2    Years of education: Not on file    Highest education level: Not on file   Occupational History    Not on file   Tobacco Use    Smoking status: Never    Smokeless tobacco: Never   Vaping Use     Vaping status: Never Used   Substance and Sexual Activity    Alcohol use: Not Currently     Comment: rarely    Drug use: Never    Sexual activity: Yes     Partners: Male     Birth control/protection: Post-menopausal   Other Topics Concern    Not on file   Social History Narrative        2 children    Works for [employer]    Hobbies include walking, ceramics     Social Determinants of Health     Financial Resource Strain: Not on file   Food Insecurity: Not on file   Transportation Needs: Not on file   Physical Activity: Not on file   Stress: Not on file   Social Connections: Not on file   Intimate Partner Violence: Not on file   Housing Stability: Not on file     Presently lives with spouse.  Patient is .  Patient is currently employed [employer]  No Known Allergies    Current Outpatient Medications:     alendronate (FOSAMAX) 70 mg tablet, Take once every week on empty stomach with water and stay upright for 1/2 hour after taking the medication, Disp: 12 tablet, Rfl: 0    amphetamine-dextroamphetamine (ADDERALL XR, 20MG,) 20 MG 24 hr capsule, Take 1 capsule (20 mg total) by mouth every morning Max Daily Amount: 20 mg, Disp: 90 capsule, Rfl: 0    calcium carbonate (OS-IVONE) 600 MG tablet, Take 600 mg by mouth 2 (two) times a day with meals, Disp: , Rfl:     Cholecalciferol (VITAMIN D) 2000 units CAPS, Take by mouth Take 5000IU daily, Disp: , Rfl:     Multiple Vitamin (MULTI-VITAMIN DAILY) TABS, Take by mouth, Disp: , Rfl:     ofloxacin (OCUFLOX) 0.3 % ophthalmic solution, Administer 1 drop to the right eye 4 (four) times a day, Disp: 5 mL, Rfl: 0    sertraline (ZOLOFT) 100 mg tablet, Take 1 tablet (100 mg total) by mouth daily at bedtime, Disp: 90 tablet, Rfl: 2    Review of Systems:  Review of Systems   Constitutional:  Negative for activity change, chills, fever and unexpected weight change.   HENT:  Negative for congestion, ear pain, hearing loss and sore throat.    Respiratory:  Negative  for cough, chest tightness and shortness of breath.    Cardiovascular:  Negative for chest pain and leg swelling.   Gastrointestinal:  Negative for abdominal pain, constipation, diarrhea, nausea and vomiting.   Endocrine: Negative for polydipsia, polyphagia and polyuria.   Genitourinary:  Negative for difficulty urinating, dysuria, frequency, menstrual problem, pelvic pain, vaginal discharge and vaginal pain.   Skin:  Negative for color change and rash.   Neurological:  Negative for dizziness, numbness and headaches.   Psychiatric/Behavioral:  Negative for agitation and confusion.        Physical Exam:  BP 98/60 (BP Location: Left arm, Patient Position: Sitting, Cuff Size: Standard)   Wt 64.3 kg (141 lb 12.8 oz)   BMI 27.69 kg/m²    Physical Exam  Constitutional:       General: She is not in acute distress.     Appearance: Normal appearance. She is normal weight.   Genitourinary:      Vulva, bladder, rectum and urethral meatus normal.      No lesions in the vagina.      Right Labia: No rash, tenderness or lesions.     Left Labia: No tenderness, lesions or rash.     No labial fusion noted.      No vaginal discharge or tenderness.      No vaginal prolapse present.     No vaginal atrophy present.       Right Adnexa: not tender, not full and no mass present.     Left Adnexa: not tender, not full and no mass present.     No cervical motion tenderness or friability.      Uterus is not enlarged or prolapsed.   Breasts:     Breasts are soft.     Right: No inverted nipple, mass, nipple discharge or skin change.      Left: No inverted nipple, mass, nipple discharge or skin change.      Breast exam comments: Right lumpectomy scar .  HENT:      Head: Normocephalic and atraumatic.      Nose: Nose normal.   Eyes:      Conjunctiva/sclera: Conjunctivae normal.      Pupils: Pupils are equal, round, and reactive to light.   Cardiovascular:      Rate and Rhythm: Normal rate and regular rhythm.      Pulses: Normal pulses.      Heart  sounds: Normal heart sounds.   Pulmonary:      Effort: Pulmonary effort is normal. No respiratory distress.      Breath sounds: Normal breath sounds. No wheezing.   Abdominal:      General: Abdomen is flat. There is no distension.      Palpations: Abdomen is soft.      Tenderness: There is no abdominal tenderness. There is no guarding.   Musculoskeletal:         General: Normal range of motion.      Cervical back: Normal range of motion and neck supple.   Neurological:      General: No focal deficit present.      Mental Status: She is alert and oriented to person, place, and time.   Skin:     General: Skin is warm and dry.   Psychiatric:         Mood and Affect: Mood normal.         Behavior: Behavior normal.         Thought Content: Thought content normal.         Judgment: Judgment normal.   Vitals and nursing note reviewed.

## 2023-12-22 LAB
LAB AP GYN PRIMARY INTERPRETATION: NORMAL
Lab: NORMAL

## 2023-12-28 ENCOUNTER — HOSPITAL ENCOUNTER (OUTPATIENT)
Dept: RADIOLOGY | Age: 63
Discharge: HOME | End: 2023-12-28
Payer: COMMERCIAL

## 2023-12-28 VITALS — BODY MASS INDEX: 27.83 KG/M2 | WEIGHT: 141.76 LBS | HEIGHT: 60 IN

## 2023-12-28 DIAGNOSIS — Z12.31 VISIT FOR SCREENING MAMMOGRAM: ICD-10-CM

## 2023-12-28 PROCEDURE — 77067 SCR MAMMO BI INCL CAD: CPT

## 2023-12-28 PROCEDURE — 77063 BREAST TOMOSYNTHESIS BI: CPT

## 2024-01-08 ENCOUNTER — OFFICE VISIT (OUTPATIENT)
Dept: SURGICAL ONCOLOGY | Facility: CLINIC | Age: 64
End: 2024-01-08
Payer: COMMERCIAL

## 2024-01-08 VITALS
RESPIRATION RATE: 16 BRPM | HEIGHT: 60 IN | BODY MASS INDEX: 28.27 KG/M2 | TEMPERATURE: 97.2 F | DIASTOLIC BLOOD PRESSURE: 70 MMHG | WEIGHT: 144 LBS | OXYGEN SATURATION: 96 % | SYSTOLIC BLOOD PRESSURE: 110 MMHG | HEART RATE: 72 BPM

## 2024-01-08 DIAGNOSIS — Z86.000 HISTORY OF DUCTAL CARCINOMA IN SITU (DCIS) OF BREAST: Primary | ICD-10-CM

## 2024-01-08 DIAGNOSIS — Z08 ENCOUNTER FOR FOLLOW-UP SURVEILLANCE OF DUCTAL CARCINOMA IN SITU OF BREAST: ICD-10-CM

## 2024-01-08 DIAGNOSIS — Z86.000 ENCOUNTER FOR FOLLOW-UP SURVEILLANCE OF DUCTAL CARCINOMA IN SITU OF BREAST: ICD-10-CM

## 2024-01-08 PROCEDURE — 99213 OFFICE O/P EST LOW 20 MIN: CPT | Performed by: SURGERY

## 2024-01-08 NOTE — PROGRESS NOTES
Surgical Oncology Follow Up       [facility] [place]  [address]  [place] [address]    Lydia Orellana  1960  [id number]  [facility] [place]  [address]  [place] [address]    Chief Complaint   Patient presents with    Follow-up          Assessment & Plan:   Patient was originally going to follow-up with PCP however on her mammogram it said additional imaging is required but also SATB2 and scheduled for 1 year follow-up screening mammogram.  This may potentially be a typo.  I think Dr. Roberto who read the film is out this week I have messaged her in epic I will clarify this point for the patient.  Otherwise she is scheduled for mammogram in 1 year.  She has a normal clinical exam today.    Cancer History:     Oncology History   History of ductal carcinoma in situ (DCIS) of breast   5/14/2013 Initial Diagnosis    Biopsy at [facility]  Right Atypical ductal hyperplasia     6/25/2013 Surgery    Right needle localization/lumpectomy  DCIS  Grade 1      Stage 0     7/2013 -  Hormone Therapy    Med onc consult  No systemic therapy recommended     9/6/2013 - 10/23/2013 Radiation    Whole breast Radiation therapy           Interval History:   See above    Review of Systems:   Review of Systems   All other systems reviewed and are negative.      Past Medical History     Patient Active Problem List   Diagnosis    History of ductal carcinoma in situ (DCIS) of breast    Hypercholesterolemia    Obstructive sleep apnea    Status post gastric bypass for obesity    Mixed anxiety and depressive disorder    Premature menopause    ADHD    Family history of colon cancer    Dysphagia    Elevated bilirubin    Osteoporosis without current pathological fracture    Simple chronic serous otitis media, right     Past Medical History:   Diagnosis Date    ADHD     Anxiety     Arthritis      Asthma     Breast cancer (HCC) 01/01/2012    Cancer (HCC) 08/2012    DCIS-right breast    Closed fracture of radial styloid     CPAP (continuous positive airway pressure) dependence     Endometriosis     Family history of colon cancer in mother     Forceps delivery     1991 daughter    GERD (gastroesophageal reflux disease)     H/O mitral valve prolapse     no regurgitation    Hiatal hernia     History of radiation therapy     Hyperlipidemia     Infertility, female     Normal delivery     1998 daughter    Sleep apnea     on cpap    TB lung, latent     treated with INH (in her 30's)     Past Surgical History:   Procedure Laterality Date    BREAST BIOPSY Right 04/01/2012    biopsy breast percutaneous needle core    BREAST LUMPECTOMY Right 08/01/2012    CHOLECYSTECTOMY  01/23/2013    COLONOSCOPY      DILATION AND CURETTAGE OF UTERUS      ENDOMETRIAL BIOPSY      without cervical dilation    KNEE ARTHROSCOPY      Plica syndrome    LAPAROSCOPY      Exploratory 1990 & 1994    SALUD-EN-Y PROCEDURE  01/23/2013    Dr Crawford    UPPER GASTROINTESTINAL ENDOSCOPY      US GUIDANCE  5/14/2013     Family History   Problem Relation Age of Onset    Colon cancer Mother 50    Cancer Mother         colon    Osteoporosis Mother     Stroke Father     Hypertension Father     Cancer Sister 71        spinal cancer    Kidney cancer Sister 70    No Known Problems Daughter     No Known Problems Daughter     No Known Problems Maternal Grandmother     Colon cancer Maternal Grandfather 80    Cancer Maternal Grandfather         colon    Hypertension Paternal Grandmother     Stroke Paternal Grandmother     No Known Problems Paternal Grandfather     No Known Problems Paternal Aunt     Hypertension Family     Mitral valve prolapse Family     Osteoporosis Family     Varicose Veins Family      Social History     Socioeconomic History    Marital status:      Spouse name: Not on file    Number of children: 2    Years of education: Not on file     Highest education level: Not on file   Occupational History    Not on file   Tobacco Use    Smoking status: Never    Smokeless tobacco: Never   Vaping Use    Vaping status: Never Used   Substance and Sexual Activity    Alcohol use: Not Currently     Comment: rarely    Drug use: Never    Sexual activity: Yes     Partners: Male     Birth control/protection: Post-menopausal   Other Topics Concern    Not on file   Social History Narrative        2 children    Works for [employer]    Hobbies include walking, ceramics     Social Determinants of Health     Financial Resource Strain: Not on file   Food Insecurity: Not on file   Transportation Needs: Not on file   Physical Activity: Not on file   Stress: Not on file   Social Connections: Not on file   Intimate Partner Violence: Not on file   Housing Stability: Not on file       Current Outpatient Medications:     alendronate (FOSAMAX) 70 mg tablet, Take once every week on empty stomach with water and stay upright for 1/2 hour after taking the medication, Disp: 12 tablet, Rfl: 0    amphetamine-dextroamphetamine (ADDERALL XR, 20MG,) 20 MG 24 hr capsule, Take 1 capsule (20 mg total) by mouth every morning Max Daily Amount: 20 mg, Disp: 90 capsule, Rfl: 0    calcium carbonate (OS-IVONE) 600 MG tablet, Take 600 mg by mouth 2 (two) times a day with meals, Disp: , Rfl:     Cholecalciferol (VITAMIN D) 2000 units CAPS, Take by mouth Take 5000IU daily, Disp: , Rfl:     Multiple Vitamin (MULTI-VITAMIN DAILY) TABS, Take by mouth, Disp: , Rfl:     ofloxacin (OCUFLOX) 0.3 % ophthalmic solution, Administer 1 drop to the right eye 4 (four) times a day, Disp: 5 mL, Rfl: 0    sertraline (ZOLOFT) 100 mg tablet, Take 1 tablet (100 mg total) by mouth daily at bedtime, Disp: 90 tablet, Rfl: 2  No Known Allergies    Physical Exam:     Vitals:    01/08/24 0809   BP: 110/70   Pulse: 72   Resp: 16   Temp: (!) 97.2 °F (36.2 °C)   SpO2: 96%     Physical Exam  Vitals reviewed.    Constitutional:       Appearance: She is well-developed.   HENT:      Head: Normocephalic and atraumatic.   Eyes:      Pupils: Pupils are equal, round, and reactive to light.   Neck:      Thyroid: No thyromegaly.      Vascular: No JVD.      Trachea: No tracheal deviation.   Cardiovascular:      Rate and Rhythm: Normal rate and regular rhythm.      Heart sounds: Normal heart sounds. No murmur heard.     No friction rub. No gallop.   Pulmonary:      Effort: Pulmonary effort is normal. No respiratory distress.      Breath sounds: Normal breath sounds. No wheezing or rales.   Chest:          Comments: Both breasts were examined in the sitting and supine position. There are no worrisome skin lesions, no nipple retraction and no nipple discharge. There are no dominant masses, axillary adenopathy or supraclavicular adenopathy on either side.  Abdominal:      General: There is no distension.      Palpations: Abdomen is soft. There is no hepatomegaly or mass.      Tenderness: There is no abdominal tenderness. There is no guarding or rebound.   Musculoskeletal:         General: No tenderness. Normal range of motion.      Cervical back: Normal range of motion and neck supple.   Lymphadenopathy:      Cervical: No cervical adenopathy.   Skin:     General: Skin is warm and dry.      Findings: No erythema or rash.   Neurological:      Mental Status: She is alert and oriented to person, place, and time.      Cranial Nerves: No cranial nerve deficit.   Psychiatric:         Behavior: Behavior normal.           Results & Discussion:   The patient's mammogram report is conflicting's stating she should come back in a year for routine screening mammogram but also is asking for additional imaging but was not specified.  This may be a type we will try to clarify this for the patient.  We will contact her when we clarify the issue with Dr. Roberto.          Advance Care Planning/Advance Directives:  I discussed the disease status,  treatment plans and follow-up with the patient.

## 2024-01-11 ENCOUNTER — APPOINTMENT (OUTPATIENT)
Dept: LAB | Age: 64
End: 2024-01-11

## 2024-01-11 DIAGNOSIS — Z00.6 ENCOUNTER FOR EXAMINATION FOR NORMAL COMPARISON OR CONTROL IN CLINICAL RESEARCH PROGRAM: ICD-10-CM

## 2024-01-11 PROCEDURE — 36415 COLL VENOUS BLD VENIPUNCTURE: CPT

## 2024-01-18 ENCOUNTER — TELEPHONE (OUTPATIENT)
Dept: SURGICAL ONCOLOGY | Facility: CLINIC | Age: 64
End: 2024-01-18

## 2024-01-18 NOTE — TELEPHONE ENCOUNTER
Called the patient to follow up with her mammogram that was discussed at her previous appointment with Dr. Danielle, but there was no answer. Left a message stating that the radiologist reviewed her mammogram and there was indeed a typo that recommended additional images. Stated that the radiologist edited the report and her mammogram is benign, meaning she should return to routine screening with no additional breast imaging needed at the current time. A direct call back number was provided if the patient had any additional questions.

## 2024-02-21 DIAGNOSIS — M81.8 OTHER OSTEOPOROSIS WITHOUT CURRENT PATHOLOGICAL FRACTURE: ICD-10-CM

## 2024-02-23 RX ORDER — ALENDRONATE SODIUM 70 MG/1
TABLET ORAL
Qty: 12 TABLET | Refills: 0 | Status: SHIPPED | OUTPATIENT
Start: 2024-02-23

## 2024-03-07 ENCOUNTER — APPOINTMENT (OUTPATIENT)
Dept: LAB | Age: 64
End: 2024-03-07

## 2024-03-07 LAB — GENE DIS ANL INTERP-IMP: NORMAL

## 2024-03-12 ENCOUNTER — OFFICE VISIT (OUTPATIENT)
Dept: FAMILY MEDICINE CLINIC | Facility: CLINIC | Age: 64
End: 2024-03-12
Payer: COMMERCIAL

## 2024-03-12 VITALS
OXYGEN SATURATION: 94 % | WEIGHT: 145.8 LBS | RESPIRATION RATE: 16 BRPM | SYSTOLIC BLOOD PRESSURE: 120 MMHG | HEIGHT: 60 IN | TEMPERATURE: 98.1 F | HEART RATE: 92 BPM | BODY MASS INDEX: 28.62 KG/M2 | DIASTOLIC BLOOD PRESSURE: 80 MMHG

## 2024-03-12 DIAGNOSIS — F90.9 ATTENTION DEFICIT HYPERACTIVITY DISORDER (ADHD), UNSPECIFIED ADHD TYPE: ICD-10-CM

## 2024-03-12 DIAGNOSIS — F41.8 MIXED ANXIETY AND DEPRESSIVE DISORDER: Primary | ICD-10-CM

## 2024-03-12 DIAGNOSIS — G47.33 OBSTRUCTIVE SLEEP APNEA: ICD-10-CM

## 2024-03-12 PROCEDURE — 99214 OFFICE O/P EST MOD 30 MIN: CPT | Performed by: FAMILY MEDICINE

## 2024-03-12 RX ORDER — DEXTROAMPHETAMINE SULFATE, DEXTROAMPHETAMINE SACCHARATE, AMPHETAMINE SULFATE AND AMPHETAMINE ASPARTATE 5; 5; 5; 5 MG/1; MG/1; MG/1; MG/1
20 CAPSULE, EXTENDED RELEASE ORAL EVERY MORNING
Qty: 90 CAPSULE | Refills: 0 | Status: SHIPPED | OUTPATIENT
Start: 2024-03-12

## 2024-03-12 RX ORDER — DEXTROAMPHETAMINE SACCHARATE, AMPHETAMINE ASPARTATE, DEXTROAMPHETAMINE SULFATE AND AMPHETAMINE SULFATE 2.5; 2.5; 2.5; 2.5 MG/1; MG/1; MG/1; MG/1
10 TABLET ORAL DAILY PRN
Qty: 90 TABLET | Refills: 0 | Status: SHIPPED | OUTPATIENT
Start: 2024-03-12

## 2024-03-13 NOTE — PROGRESS NOTES
Subjective:      Patient ID: Lydia Orellana is a 63 y.o. female.    Here for follow up -  Hyperlipidemia: following dietary instructions, trying to lose weight,baseline 138 lbs  Depression and anxiety- controlled on zoloft 100 mg qd,   ADHD-   Pt takes adderall XR 20 mg qd and adderall 10 mg but has been noticing that everytime the pharmacy changes  her symptoms change, she feels best controlled on brandname adderall  Obstructive sleep apnea- controlled  Parathyroid lesion -3 mm, saw endocrine, monitoring PTH, VIT d and calcium  Osteoporosis- started on weekly alendronate , denies any side effects, taking calcium and vit d supplements  History of gastric bypass- taking multivitamin supplements   No cp/sob. No headaches.No GI upset or insomnia. No palpitations. Needs refills. No pain or new c/o.           Past Medical History:   Diagnosis Date    ADHD     Anxiety     Arthritis     Asthma     Breast cancer ([facility]) 01/01/2012    Cancer ([facility]) 08/2012    DCIS-right breast    Closed fracture of radial styloid     CPAP (continuous positive airway pressure) dependence     Endometriosis     Family history of colon cancer in mother     Forceps delivery     1991 daughter    GERD (gastroesophageal reflux disease)     H/O mitral valve prolapse     no regurgitation    Hiatal hernia     History of radiation therapy     Hyperlipidemia     Infertility, female     Normal delivery     1998 daughter    Sleep apnea     on cpap    TB lung, latent     treated with INH (in her 30's)       Family History   Problem Relation Age of Onset    Colon cancer Mother 50    Cancer Mother         colon    Osteoporosis Mother     Stroke Father     Hypertension Father     Cancer Sister 71        spinal cancer    Kidney cancer Sister 70    No Known Problems Daughter     No Known Problems Daughter     No Known Problems Maternal Grandmother     Colon cancer Maternal Grandfather 80    Cancer Maternal Grandfather         colon    Hypertension  Paternal Grandmother     Stroke Paternal Grandmother     No Known Problems Paternal Grandfather     No Known Problems Paternal Aunt     Hypertension Family     Mitral valve prolapse Family     Osteoporosis Family     Varicose Veins Family        Past Surgical History:   Procedure Laterality Date    BREAST BIOPSY Right 04/01/2012    biopsy breast percutaneous needle core    BREAST LUMPECTOMY Right 08/01/2012    CHOLECYSTECTOMY  01/23/2013    COLONOSCOPY      DILATION AND CURETTAGE OF UTERUS      ENDOMETRIAL BIOPSY      without cervical dilation    KNEE ARTHROSCOPY      Plica syndrome    LAPAROSCOPY      Exploratory 1990 & 1994    SALUD-EN-Y PROCEDURE  01/23/2013    Dr Crawford    UPPER GASTROINTESTINAL ENDOSCOPY      US GUIDANCE  5/14/2013        reports that she has never smoked. She has never used smokeless tobacco. She reports that she does not currently use alcohol. She reports that she does not use drugs.      Current Outpatient Medications:     ADDERALL XR, 20MG, 20 MG 24 hr capsule, Take 1 capsule (20 mg total) by mouth every morning Max Daily Amount: 20 mg, Disp: 90 capsule, Rfl: 0    alendronate (FOSAMAX) 70 mg tablet, Take once every week on empty stomach with water and stay upright for 1/2 hour after taking the medication, Disp: 12 tablet, Rfl: 0    amphetamine-dextroamphetamine (ADDERALL, 10MG,) 10 mg tablet, Take 1 tablet (10 mg total) by mouth daily as needed (ADHD) Max Daily Amount: 10 mg, Disp: 90 tablet, Rfl: 0    calcium carbonate (OS-IVONE) 600 MG tablet, Take 600 mg by mouth 2 (two) times a day with meals, Disp: , Rfl:     Cholecalciferol (VITAMIN D) 2000 units CAPS, Take by mouth Take 5000IU daily, Disp: , Rfl:     Multiple Vitamin (MULTI-VITAMIN DAILY) TABS, Take by mouth, Disp: , Rfl:     sertraline (ZOLOFT) 100 mg tablet, Take 1 tablet (100 mg total) by mouth daily at bedtime, Disp: 90 tablet, Rfl: 2    ofloxacin (OCUFLOX) 0.3 % ophthalmic solution, Administer 1 drop to the right eye 4 (four) times  a day (Patient not taking: Reported on 3/12/2024), Disp: 5 mL, Rfl: 0    The following portions of the patient's history were reviewed and updated as appropriate: allergies, current medications, past family history, past medical history, past social history, past surgical history and problem list.    Review of Systems   Constitutional:  Negative for fatigue and fever.   HENT:  Negative for congestion, facial swelling, mouth sores, rhinorrhea, sore throat and trouble swallowing.    Eyes:  Negative for pain and redness.   Respiratory:  Negative for cough, shortness of breath and wheezing.    Cardiovascular:  Negative for chest pain, palpitations and leg swelling.   Gastrointestinal:  Negative for abdominal pain, blood in stool, constipation, diarrhea and nausea.   Genitourinary:  Negative for dysuria, hematuria and urgency.   Musculoskeletal:  Negative for arthralgias, back pain and myalgias.   Skin:  Negative for rash and wound.   Neurological:  Negative for seizures, syncope and headaches.   Hematological:  Negative for adenopathy.   Psychiatric/Behavioral:  Negative for agitation and behavioral problems.          PHQ-2/9 Depression Screening    Little interest or pleasure in doing things: 0 - not at all  Feeling down, depressed, or hopeless: 0 - not at all  Trouble falling or staying asleep, or sleeping too much: 0 - not at all  Feeling tired or having little energy: 0 - not at all  Poor appetite or overeatin - not at all  Feeling bad about yourself - or that you are a failure or have let yourself or your family down: 0 - not at all  Trouble concentrating on things, such as reading the newspaper or watching television: 0 - not at all  Moving or speaking so slowly that other people could have noticed. Or the opposite - being so fidgety or restless that you have been moving around a lot more than usual: 0 - not at all  Thoughts that you would be better off dead, or of hurting yourself in some way: 0 - not at  all  PHQ-9 Score: 0  PHQ-9 Interpretation: No or Minimal depression             Objective:    /80 (BP Location: Left arm, Patient Position: Sitting, Cuff Size: Adult)   Pulse 92   Temp 98.1 °F (36.7 °C) (Tympanic)   Resp 16   Ht 5' (1.524 m)   Wt 66.1 kg (145 lb 12.8 oz)   SpO2 94%   BMI 28.47 kg/m²      Physical Exam  Vitals and nursing note reviewed.   Constitutional:       Appearance: Normal appearance. She is well-developed. She is not ill-appearing.   HENT:      Head: Normocephalic and atraumatic.      Right Ear: External ear normal.      Left Ear: External ear normal.      Nose: Nose normal.      Mouth/Throat:      Mouth: Mucous membranes are moist.      Pharynx: No oropharyngeal exudate or posterior oropharyngeal erythema.   Eyes:      General: No scleral icterus.        Right eye: No discharge.         Left eye: No discharge.      Conjunctiva/sclera: Conjunctivae normal.   Cardiovascular:      Rate and Rhythm: Normal rate.      Heart sounds: No murmur heard.     No gallop.   Pulmonary:      Effort: Pulmonary effort is normal. No respiratory distress.      Breath sounds: Normal breath sounds. No stridor. No wheezing, rhonchi or rales.   Abdominal:      Palpations: Abdomen is soft.      Tenderness: There is no abdominal tenderness.   Musculoskeletal:         General: No tenderness or deformity.   Skin:     Findings: No erythema or rash.   Neurological:      Mental Status: She is alert. Mental status is at baseline.   Psychiatric:         Behavior: Behavior normal.         Judgment: Judgment normal.           Recent Results (from the past 8736 hour(s))   Lipid panel    Collection Time: 04/04/23  2:58 PM   Result Value Ref Range    Cholesterol 211 (H) See Comment mg/dL    Triglycerides 112 See Comment mg/dL    HDL, Direct 60 >=50 mg/dL    LDL Calculated 129 (H) 0 - 100 mg/dL    Non-HDL-Chol (CHOL-HDL) 151 mg/dl   Hemoglobin A1C    Collection Time: 04/04/23  2:58 PM   Result Value Ref Range     Hemoglobin A1C 5.2 Normal 3.8-5.6%; PreDiabetic 5.7-6.4%; Diabetic >=6.5%; Glycemic control for adults with diabetes <7.0% %     mg/dl   CBC and differential    Collection Time: 05/13/23  9:52 AM   Result Value Ref Range    WBC 6.66 4.31 - 10.16 Thousand/uL    RBC 4.52 3.81 - 5.12 Million/uL    Hemoglobin 13.5 11.5 - 15.4 g/dL    Hematocrit 42.7 34.8 - 46.1 %    MCV 95 82 - 98 fL    MCH 29.9 26.8 - 34.3 pg    MCHC 31.6 31.4 - 37.4 g/dL    RDW 12.8 11.6 - 15.1 %    MPV 10.0 8.9 - 12.7 fL    Platelets 203 149 - 390 Thousands/uL    nRBC 0 /100 WBCs    Neutrophils Relative 61 43 - 75 %    Immature Grans % 0 0 - 2 %    Lymphocytes Relative 30 14 - 44 %    Monocytes Relative 8 4 - 12 %    Eosinophils Relative 1 0 - 6 %    Basophils Relative 0 0 - 1 %    Neutrophils Absolute 4.07 1.85 - 7.62 Thousands/µL    Absolute Immature Grans 0.02 0.00 - 0.20 Thousand/uL    Absolute Lymphocytes 1.99 0.60 - 4.47 Thousands/µL    Absolute Monocytes 0.51 0.17 - 1.22 Thousand/µL    Eosinophils Absolute 0.05 0.00 - 0.61 Thousand/µL    Basophils Absolute 0.02 0.00 - 0.10 Thousands/µL   Lipid panel    Collection Time: 05/13/23  9:52 AM   Result Value Ref Range    Cholesterol 219 (H) See Comment mg/dL    Triglycerides 77 See Comment mg/dL    HDL, Direct 60 >=50 mg/dL    LDL Calculated 144 (H) 0 - 100 mg/dL    Non-HDL-Chol (CHOL-HDL) 159 mg/dl   Hemoglobin A1C    Collection Time: 05/13/23  9:52 AM   Result Value Ref Range    Hemoglobin A1C 5.0 Normal 3.8-5.6%; PreDiabetic 5.7-6.4%; Diabetic >=6.5%; Glycemic control for adults with diabetes <7.0% %    EAG 97 mg/dl   Comprehensive metabolic panel    Collection Time: 11/29/23  8:21 AM   Result Value Ref Range    Sodium 139 135 - 147 mmol/L    Potassium 4.0 3.5 - 5.3 mmol/L    Chloride 107 96 - 108 mmol/L    CO2 26 21 - 32 mmol/L    ANION GAP 6 mmol/L    BUN 20 5 - 25 mg/dL    Creatinine 0.74 0.60 - 1.30 mg/dL    Glucose, Fasting 89 65 - 99 mg/dL    Calcium 8.9 8.4 - 10.2 mg/dL    AST 17 13  - 39 U/L    ALT 16 7 - 52 U/L    Alkaline Phosphatase 48 34 - 104 U/L    Total Protein 6.8 6.4 - 8.4 g/dL    Albumin 4.3 3.5 - 5.0 g/dL    Total Bilirubin 0.57 0.20 - 1.00 mg/dL    eGFR 85 ml/min/1.73sq m   Liquid-based pap, screening    Collection Time: 12/18/23  8:54 AM   Result Value Ref Range    Case Report       Gynecologic Cytology Report                       Case: [id number]                                  Authorizing Provider:  Trice Garcia MD     Collected:           12/18/2023 0854              Ordering Location:     [facility]  Received:            12/18/2023 0854                                     Health                                                                       First Screen:          Guillaume Lewis                                                                 Specimen:    LIQUID-BASED PAP, SCREENING, Cervix, Endocervical                                          Primary Interpretation Negative for intraepithelial lesion or malignancy     Specimen Adequacy       Satisfactory for evaluation. Endocervical/transformation zone component present.    Additional Information       [employer]'s FDA approved ,  and ThinPrep Imaging Duo System are utilized with strict adherence to the 's instruction manual to prepare gynecologic and non-gynecologic cytology specimens for the production of ThinPrep slides as well as for gynecologic ThinPrep imaging. These processes have been validated by our laboratory and/or by the .  The Pap test is not a diagnostic procedure and should not be used as the sole means to detect cervical cancer. It is only a screening procedure to aid in the detection of cervical cancer and its precursors. Both false-negative and false-positive results have been experienced. Your patient's test result should be interpreted in this context together with the history and clinical findings.     HPV High Risk    Collection Time: 12/18/23   8:54 AM    Specimen: Thin-Prep Vial   Result Value Ref Range    HPV Other HR Negative Negative    HPV16 Negative Negative    HPV18 Negative Negative   [facility] Molecular Screen    Collection Time: 01/11/24  4:45 PM   Result Value Ref Range    GENETIC ANALYSIS OVERALL INTERPRETATION TNP        Laboratory Results: I have personally reviewed the pertinent laboratory results/reports     Radiology/Other Diagnostic Testing Results: I have personally reviewed pertinent reports.      Mammo screening bilateral w 3d & cad    Addendum Date: 1/16/2024    Addendum: This is to correct the impression which should read as follows: IMPRESSION: No mammographic evidence of malignancy. ASSESSMENT/BI-RADS CATEGORY: Left: 2 - Benign Right: 2 - Benign Overall: 2 - Benign RECOMMENDATION:      - Routine screening mammogram in 1 year for both breasts. End addendum. Workstation ID: [id number]     Result Date: 1/16/2024  DIAGNOSIS: Visit for screening mammogram TECHNIQUE: Digital screening mammography was performed. Computer Aided Detection (CAD) analyzed all applicable images. COMPARISONS: Prior breast imaging dated: 12/27/2022, 12/23/2021, 12/17/2020, 12/11/2019, 06/04/2018, 05/15/2017, 05/12/2016, 05/11/2015, 05/01/2014, and 01/22/2014 RELEVANT HISTORY: Family Breast Cancer History: No known family history of breast cancer. Family Medical History: Family medical history includes colon cancer in 2 relatives (maternal grandfather, mother). Personal History: Hormone history includes birth control. Surgical history includes breast biopsy and lumpectomy. Medical history includes breast cancer. The patient is scheduled in a reminder system for screening mammography. 8-10% of cancers will be missed on mammography. Management of a palpable abnormality must be based on clinical grounds.  Patients will be notified of their results via letter from our facility. Accredited by [employer] College [employer] and FDA. RISK ASSESSMENT: Tyrer-Cuzick risk  "assessment reporting was suppressed due to the patient's history and/or demographic factors. TISSUE DENSITY: There are scattered areas of fibroglandular density. INDICATION: Lydia Orellana is a 63 y.o. female presenting for screening mammography. FINDINGS: Bilateral There are no suspicious masses, grouped microcalcifications or areas of unexplained architectural distortion. The skin and nipple areolar complex are unremarkable.  There are stable postsurgical changes in the right breast.  There are benign calcifications in both breasts.     Additional imaging required. A breast health care nurse from our facility will be contacting the patient regarding the need for additional imaging. ASSESSMENT/BI-RADS CATEGORY: Left: 2 - Benign Right: 2 - Benign Overall: 2 - Benign RECOMMENDATION:      - Routine screening mammogram in 1 year for both breasts. Workstation ID: [id number]        Assessment/Plan:  Problem List Items Addressed This Visit          Respiratory    Obstructive sleep apnea       Behavioral Health    Mixed anxiety and depressive disorder - Primary    Relevant Medications    ADDERALL XR, 20MG, 20 MG 24 hr capsule    amphetamine-dextroamphetamine (ADDERALL, 10MG,) 10 mg tablet    ADHD    Relevant Medications    ADDERALL XR, 20MG, 20 MG 24 hr capsule    amphetamine-dextroamphetamine (ADDERALL, 10MG,) 10 mg tablet       Given fluctuations on generic amphetamine-dextroamphetamine capsule 20 mg daily, would recommend trying brandname adderall, insurance coverage could be a barrier  Continue current medications  F/u in 3-4 months             Read package inserts for all medications before starting a new medications, call me if you have any questions.    Patient was given opportunity to ask questions and all questions were answered.    Disclaimer: Portions of the record may have been created with voice recognition software. Occasional wrong word or \"sound a like\" substitutions may have occurred due to the inherent " limitations of voice recognition software. Read the chart carefully and recognize, using context, where substitutions have occurred. I have used the Epic copy/forward function to compose this note. I have reviewed my current note to ensure it reflects the current patient status, exam, assessment and plan.

## 2024-04-01 ENCOUNTER — APPOINTMENT (OUTPATIENT)
Dept: LAB | Age: 64
End: 2024-04-01

## 2024-04-01 DIAGNOSIS — Z00.8 HEALTH EXAMINATION IN POPULATION SURVEY: ICD-10-CM

## 2024-04-01 LAB
CHOLEST SERPL-MCNC: 233 MG/DL
EST. AVERAGE GLUCOSE BLD GHB EST-MCNC: 105 MG/DL
HBA1C MFR BLD: 5.3 %
HDLC SERPL-MCNC: 70 MG/DL
LDLC SERPL CALC-MCNC: 141 MG/DL (ref 0–100)
NONHDLC SERPL-MCNC: 163 MG/DL
TRIGL SERPL-MCNC: 111 MG/DL

## 2024-04-01 PROCEDURE — 83036 HEMOGLOBIN GLYCOSYLATED A1C: CPT

## 2024-04-01 PROCEDURE — 36415 COLL VENOUS BLD VENIPUNCTURE: CPT

## 2024-04-01 PROCEDURE — 80061 LIPID PANEL: CPT

## 2024-04-16 ENCOUNTER — PATIENT MESSAGE (OUTPATIENT)
Dept: FAMILY MEDICINE CLINIC | Facility: CLINIC | Age: 64
End: 2024-04-16

## 2024-04-16 DIAGNOSIS — G47.33 OBSTRUCTIVE SLEEP APNEA: Primary | ICD-10-CM

## 2024-05-02 LAB
APOB+LDLR+PCSK9 GENE MUT ANL BLD/T: NOT DETECTED
BRCA1+BRCA2 DEL+DUP + FULL MUT ANL BLD/T: NOT DETECTED
MLH1+MSH2+MSH6+PMS2 GN DEL+DUP+FUL M: NOT DETECTED

## 2024-05-20 DIAGNOSIS — M81.8 OTHER OSTEOPOROSIS WITHOUT CURRENT PATHOLOGICAL FRACTURE: ICD-10-CM

## 2024-05-22 RX ORDER — ALENDRONATE SODIUM 70 MG/1
TABLET ORAL
Qty: 12 TABLET | Refills: 0 | Status: SHIPPED | OUTPATIENT
Start: 2024-05-22

## 2024-06-17 ENCOUNTER — OFFICE VISIT (OUTPATIENT)
Dept: FAMILY MEDICINE CLINIC | Facility: CLINIC | Age: 64
End: 2024-06-17
Payer: COMMERCIAL

## 2024-06-17 VITALS
SYSTOLIC BLOOD PRESSURE: 100 MMHG | WEIGHT: 144.2 LBS | OXYGEN SATURATION: 96 % | RESPIRATION RATE: 14 BRPM | BODY MASS INDEX: 28.31 KG/M2 | DIASTOLIC BLOOD PRESSURE: 60 MMHG | HEIGHT: 60 IN | HEART RATE: 56 BPM

## 2024-06-17 DIAGNOSIS — Z00.01 ENCOUNTER FOR GENERAL ADULT MEDICAL EXAMINATION WITH ABNORMAL FINDINGS: Primary | ICD-10-CM

## 2024-06-17 DIAGNOSIS — D35.1 PARATHYROID ADENOMA: ICD-10-CM

## 2024-06-17 DIAGNOSIS — E21.3 HYPERPARATHYROIDISM (HCC): ICD-10-CM

## 2024-06-17 DIAGNOSIS — M81.0 OSTEOPOROSIS WITHOUT CURRENT PATHOLOGICAL FRACTURE, UNSPECIFIED OSTEOPOROSIS TYPE: ICD-10-CM

## 2024-06-17 DIAGNOSIS — F41.8 MIXED ANXIETY AND DEPRESSIVE DISORDER: ICD-10-CM

## 2024-06-17 DIAGNOSIS — E66.3 OVERWEIGHT WITH BODY MASS INDEX (BMI) OF 27 TO 27.9 IN ADULT: ICD-10-CM

## 2024-06-17 DIAGNOSIS — J45.20 MILD INTERMITTENT ASTHMA WITHOUT COMPLICATION: ICD-10-CM

## 2024-06-17 DIAGNOSIS — F90.9 ATTENTION DEFICIT HYPERACTIVITY DISORDER (ADHD), UNSPECIFIED ADHD TYPE: ICD-10-CM

## 2024-06-17 DIAGNOSIS — G47.33 OBSTRUCTIVE SLEEP APNEA: ICD-10-CM

## 2024-06-17 DIAGNOSIS — Z98.84 STATUS POST GASTRIC BYPASS FOR OBESITY: ICD-10-CM

## 2024-06-17 PROCEDURE — 99397 PER PM REEVAL EST PAT 65+ YR: CPT | Performed by: FAMILY MEDICINE

## 2024-06-17 PROCEDURE — 99214 OFFICE O/P EST MOD 30 MIN: CPT | Performed by: FAMILY MEDICINE

## 2024-06-17 RX ORDER — DEXTROAMPHETAMINE SACCHARATE, AMPHETAMINE ASPARTATE, DEXTROAMPHETAMINE SULFATE AND AMPHETAMINE SULFATE 5; 5; 5; 5 MG/1; MG/1; MG/1; MG/1
20 TABLET ORAL DAILY
Qty: 90 TABLET | Refills: 0 | Status: SHIPPED | OUTPATIENT
Start: 2024-06-17

## 2024-06-17 RX ORDER — SERTRALINE HYDROCHLORIDE 100 MG/1
100 TABLET, FILM COATED ORAL
Qty: 90 TABLET | Refills: 2 | Status: SHIPPED | OUTPATIENT
Start: 2024-06-17

## 2024-06-17 NOTE — PROGRESS NOTES
Adult Annual Physical  Name: Lydia Orellana      : 1960      MRN: [id number]  Encounter Provider: Chayo Sales MD  Encounter Date: 2024   Encounter department: [facility]INE    Assessment & Plan   1. Encounter for general adult medical examination with abnormal findings  2. Attention deficit hyperactivity disorder (ADHD), unspecified ADHD type  -     amphetamine-dextroamphetamine (ADDERALL) 20 mg tablet; Take 1 tablet (20 mg total) by mouth daily Max Daily Amount: 20 mg  3. Mixed anxiety and depressive disorder  -     sertraline (ZOLOFT) 100 mg tablet; Take 1 tablet (100 mg total) by mouth daily at bedtime  4. Parathyroid adenoma  5. Hyperparathyroidism (HCC)  6. Osteoporosis without current pathological fracture, unspecified osteoporosis type  7. Overweight with body mass index (BMI) of 27 to 27.9 in adult  8. Obstructive sleep apnea  9. Mild intermittent asthma without complication  10. Status post gastric bypass for obesity    Will switch to adderral 20 mg daily , stop adderral xr 20 mg , follow up in 3months      Immunizations and preventive care screenings were discussed with patient today. Appropriate education was printed on patient's after visit summary.    Counseling:  Alcohol/drug use: discussed moderation in alcohol intake, the recommendations for healthy alcohol use, and avoidance of illicit drug use.  Dental Health: discussed importance of regular tooth brushing, flossing, and dental visits.  Injury prevention: discussed safety/seat belts, safety helmets, smoke detectors, carbon dioxide detectors, and smoking near bedding or upholstery.  Sexual health: discussed sexually transmitted diseases, partner selection, use of condoms, avoidance of unintended pregnancy, and contraceptive alternatives.  Exercise: the importance of regular exercise/physical activity was discussed. Recommend exercise 3-5 times per week for at least 30 minutes.          History of Present  Illness     Adult Annual Physical:  Patient presents for annual physical. Here for follow up -  Hyperlipidemia: following dietary instructions, trying to lose weight,baseline 138 lbs  Depression and anxiety- controlled on zoloft 100 mg qd,   ADHD-   Pt takes adderall XR 20 mg qd and adderall 10 mg but she feels tired on adderral xr 20 mg and has tried 20 mg adderral (2x10 mg) which helps, she mainly needs this for work and usually for 6 to 8 hours  Obstructive sleep apnea- controlled, has follow up with ENT for [facility] evaluation  Parathyroid lesion -3 mm, saw endocrine, monitoring PTH, VIT d and calcium  Osteoporosis- started on weekly alendronate , denies any side effects, taking calcium and vit d supplements  History of gastric bypass- taking multivitamin supplements   No cp/sob. No headaches.No GI upset or insomnia. No palpitations. Needs refills. No pain or new c/o. .     Diet and Physical Activity:  - Diet/Nutrition: well balanced diet, consuming 3-5 servings of fruits/vegetables daily, low carb diet and low calorie diet.  - Exercise: moderate cardiovascular exercise.    Depression Screening:    - PHQ-9 Score: 0    General Health:  - Sleep: sleeps well.  - Hearing:. grossly normal  - Vision: goes for regular eye exams.  - Dental: regular dental visits.    /GYN Health:  - Follows with GYN: yes.   - Menopause: postmenopausal.     Review of Systems   Constitutional:  Negative for fatigue and fever.   HENT:  Negative for congestion, facial swelling, mouth sores, rhinorrhea, sore throat and trouble swallowing.    Eyes:  Negative for pain and redness.   Respiratory:  Negative for cough, shortness of breath and wheezing.    Cardiovascular:  Negative for chest pain, palpitations and leg swelling.   Gastrointestinal:  Negative for abdominal pain, blood in stool, constipation, diarrhea and nausea.   Genitourinary:  Negative for dysuria, hematuria and urgency.   Musculoskeletal:  Negative for arthralgias, back pain and  myalgias.   Skin:  Negative for rash and wound.   Neurological:  Negative for seizures, syncope and headaches.   Hematological:  Negative for adenopathy.   Psychiatric/Behavioral:  Negative for agitation and behavioral problems.      Medical History Reviewed by provider this encounter:  Tobacco  Allergies  Meds  Problems  Med Hx  Surg Hx  Fam Hx       Current Outpatient Medications on File Prior to Visit   Medication Sig Dispense Refill    alendronate (FOSAMAX) 70 mg tablet Take once every week on empty stomach with water and stay upright for 1/2 hour after taking the medication 12 tablet 0    calcium carbonate (OS-IVONE) 600 MG tablet Take 600 mg by mouth 2 (two) times a day with meals      Cholecalciferol (VITAMIN D) 2000 units CAPS Take by mouth Take 5000IU daily      Multiple Vitamin (MULTI-VITAMIN DAILY) TABS Take by mouth      [DISCONTINUED] ADDERALL XR, 20MG, 20 MG 24 hr capsule Take 1 capsule (20 mg total) by mouth every morning Max Daily Amount: 20 mg 90 capsule 0    [DISCONTINUED] amphetamine-dextroamphetamine (ADDERALL, 10MG,) 10 mg tablet Take 1 tablet (10 mg total) by mouth daily as needed (ADHD) Max Daily Amount: 10 mg 90 tablet 0    [DISCONTINUED] sertraline (ZOLOFT) 100 mg tablet Take 1 tablet (100 mg total) by mouth daily at bedtime 90 tablet 2    ofloxacin (OCUFLOX) 0.3 % ophthalmic solution Administer 1 drop to the right eye 4 (four) times a day (Patient not taking: Reported on 3/12/2024) 5 mL 0     No current facility-administered medications on file prior to visit.      Social History     Tobacco Use    Smoking status: Never    Smokeless tobacco: Never   Vaping Use    Vaping status: Never Used   Substance and Sexual Activity    Alcohol use: Not Currently     Comment: rarely    Drug use: Never    Sexual activity: Yes     Partners: Male     Birth control/protection: Post-menopausal       Objective     /60 (BP Location: Right arm, Patient Position: Sitting, Cuff Size: Adult)   Pulse 56    Resp 14   Ht 5' (1.524 m)   Wt 65.4 kg (144 lb 3.2 oz)   SpO2 96%   BMI 28.16 kg/m²     Physical Exam  Vitals and nursing note reviewed.   Constitutional:       Appearance: Normal appearance. She is well-developed.   HENT:      Head: Normocephalic and atraumatic.      Right Ear: Tympanic membrane, ear canal and external ear normal.      Left Ear: Tympanic membrane, ear canal and external ear normal.      Nose: Nose normal.      Mouth/Throat:      Pharynx: No oropharyngeal exudate.   Eyes:      General: No scleral icterus.        Right eye: No discharge.         Left eye: No discharge.      Conjunctiva/sclera: Conjunctivae normal.      Pupils: Pupils are equal, round, and reactive to light.   Neck:      Thyroid: No thyromegaly.   Cardiovascular:      Rate and Rhythm: Normal rate and regular rhythm.      Heart sounds: No murmur heard.     No gallop.   Pulmonary:      Effort: Pulmonary effort is normal. No respiratory distress.      Breath sounds: Normal breath sounds. No wheezing or rales.   Abdominal:      Palpations: Abdomen is soft.      Tenderness: There is no abdominal tenderness.   Musculoskeletal:         General: No tenderness or deformity.      Cervical back: Normal range of motion.      Right lower leg: No edema.      Left lower leg: No edema.   Lymphadenopathy:      Cervical: No cervical adenopathy.   Skin:     General: Skin is warm.      Capillary Refill: Capillary refill takes less than 2 seconds.      Findings: No erythema or rash.   Neurological:      Mental Status: She is alert and oriented to person, place, and time.      Deep Tendon Reflexes: Reflexes normal.   Psychiatric:         Behavior: Behavior normal.         Thought Content: Thought content normal.         Judgment: Judgment normal.       Administrative Statements          no

## 2024-08-24 DIAGNOSIS — M81.8 OTHER OSTEOPOROSIS WITHOUT CURRENT PATHOLOGICAL FRACTURE: ICD-10-CM

## 2024-08-26 RX ORDER — ALENDRONATE SODIUM 70 MG/1
TABLET ORAL
Qty: 12 TABLET | Refills: 0 | Status: SHIPPED | OUTPATIENT
Start: 2024-08-26

## 2024-09-04 ENCOUNTER — APPOINTMENT (OUTPATIENT)
Dept: LAB | Age: 64
End: 2024-09-04
Payer: COMMERCIAL

## 2024-09-04 DIAGNOSIS — G47.33 OBSTRUCTIVE SLEEP APNEA: ICD-10-CM

## 2024-09-04 DIAGNOSIS — Z01.818 PRE-OPERATIVE EXAMINATION: ICD-10-CM

## 2024-09-04 LAB
ANION GAP SERPL CALCULATED.3IONS-SCNC: 8 MMOL/L (ref 4–13)
ATRIAL RATE: 54 BPM
BASOPHILS # BLD AUTO: 0.02 THOUSANDS/ÂΜL (ref 0–0.1)
BASOPHILS NFR BLD AUTO: 0 % (ref 0–1)
BUN SERPL-MCNC: 17 MG/DL (ref 5–25)
CALCIUM SERPL-MCNC: 9 MG/DL (ref 8.4–10.2)
CHLORIDE SERPL-SCNC: 105 MMOL/L (ref 96–108)
CO2 SERPL-SCNC: 27 MMOL/L (ref 21–32)
CREAT SERPL-MCNC: 0.74 MG/DL (ref 0.6–1.3)
EOSINOPHIL # BLD AUTO: 0.05 THOUSAND/ÂΜL (ref 0–0.61)
EOSINOPHIL NFR BLD AUTO: 1 % (ref 0–6)
ERYTHROCYTE [DISTWIDTH] IN BLOOD BY AUTOMATED COUNT: 13 % (ref 11.6–15.1)
GFR SERPL CREATININE-BSD FRML MDRD: 85 ML/MIN/1.73SQ M
GLUCOSE P FAST SERPL-MCNC: 92 MG/DL (ref 65–99)
HCT VFR BLD AUTO: 40.3 % (ref 34.8–46.1)
HGB BLD-MCNC: 13 G/DL (ref 11.5–15.4)
IMM GRANULOCYTES # BLD AUTO: 0.02 THOUSAND/UL (ref 0–0.2)
IMM GRANULOCYTES NFR BLD AUTO: 0 % (ref 0–2)
LYMPHOCYTES # BLD AUTO: 2.16 THOUSANDS/ÂΜL (ref 0.6–4.47)
LYMPHOCYTES NFR BLD AUTO: 35 % (ref 14–44)
MCH RBC QN AUTO: 30 PG (ref 26.8–34.3)
MCHC RBC AUTO-ENTMCNC: 32.3 G/DL (ref 31.4–37.4)
MCV RBC AUTO: 93 FL (ref 82–98)
MONOCYTES # BLD AUTO: 0.48 THOUSAND/ÂΜL (ref 0.17–1.22)
MONOCYTES NFR BLD AUTO: 8 % (ref 4–12)
NEUTROPHILS # BLD AUTO: 3.44 THOUSANDS/ÂΜL (ref 1.85–7.62)
NEUTS SEG NFR BLD AUTO: 56 % (ref 43–75)
NRBC BLD AUTO-RTO: 0 /100 WBCS
P AXIS: 48 DEGREES
PLATELET # BLD AUTO: 193 THOUSANDS/UL (ref 149–390)
PMV BLD AUTO: 10.4 FL (ref 8.9–12.7)
POTASSIUM SERPL-SCNC: 4 MMOL/L (ref 3.5–5.3)
PR INTERVAL: 176 MS
QRS AXIS: 77 DEGREES
QRSD INTERVAL: 74 MS
QT INTERVAL: 458 MS
QTC INTERVAL: 434 MS
RBC # BLD AUTO: 4.34 MILLION/UL (ref 3.81–5.12)
SODIUM SERPL-SCNC: 140 MMOL/L (ref 135–147)
T WAVE AXIS: 84 DEGREES
VENTRICULAR RATE: 54 BPM
WBC # BLD AUTO: 6.17 THOUSAND/UL (ref 4.31–10.16)

## 2024-09-04 PROCEDURE — 85025 COMPLETE CBC W/AUTO DIFF WBC: CPT

## 2024-09-04 PROCEDURE — 36415 COLL VENOUS BLD VENIPUNCTURE: CPT

## 2024-09-04 PROCEDURE — 93005 ELECTROCARDIOGRAM TRACING: CPT

## 2024-09-04 PROCEDURE — 80048 BASIC METABOLIC PNL TOTAL CA: CPT

## 2024-09-04 PROCEDURE — 93010 ELECTROCARDIOGRAM REPORT: CPT | Performed by: INTERNAL MEDICINE

## 2024-09-07 ENCOUNTER — APPOINTMENT (OUTPATIENT)
Dept: RADIOLOGY | Facility: MEDICAL CENTER | Age: 64
End: 2024-09-07
Payer: COMMERCIAL

## 2024-09-07 ENCOUNTER — OFFICE VISIT (OUTPATIENT)
Dept: URGENT CARE | Facility: MEDICAL CENTER | Age: 64
End: 2024-09-07
Payer: COMMERCIAL

## 2024-09-07 VITALS
OXYGEN SATURATION: 99 % | HEART RATE: 80 BPM | DIASTOLIC BLOOD PRESSURE: 70 MMHG | SYSTOLIC BLOOD PRESSURE: 120 MMHG | TEMPERATURE: 98 F | RESPIRATION RATE: 18 BRPM | BODY MASS INDEX: 27.34 KG/M2 | WEIGHT: 140 LBS

## 2024-09-07 DIAGNOSIS — S59.902A INJURY OF LEFT ELBOW, INITIAL ENCOUNTER: ICD-10-CM
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
## 2024-09-07 DIAGNOSIS — S51.012A LACERATION OF LEFT ELBOW, INITIAL ENCOUNTER: Primary | ICD-10-CM

## 2024-09-07 PROCEDURE — 99213 OFFICE O/P EST LOW 20 MIN: CPT

## 2024-09-07 PROCEDURE — 73080 X-RAY EXAM OF ELBOW: CPT

## 2024-09-07 PROCEDURE — 12002 RPR S/N/AX/GEN/TRNK2.6-7.5CM: CPT

## 2024-09-07 NOTE — PROGRESS NOTES
[facility]        NAME: Lydia Orellana is a 64 y.o. female  : 1960    MRN: [id number]  DATE: September 10, 2024  TIME: 8:32 AM    Assessment and Plan   Laceration of left elbow, initial encounter [S51.012A]  1. Laceration of left elbow, initial encounter  cephalexin (KEFLEX) 500 mg capsule      2. Injury of left elbow, initial encounter  XR elbow 3+ vw left      3. Fall on escalator, initial encounter          XR elbow 3+ vw left: No acute fracture per my read. Pending radiology final read.     Tdap updated on 2019    Universal Protocol:  Consent: Verbal consent obtained.  Risks and benefits: risks, benefits and alternatives were discussed  Consent given by: patient  Patient understanding: patient states understanding of the procedure being performed  Required items: required blood products, implants, devices, and special equipment available  Patient identity confirmed: verbally with patient  Laceration repair    Date/Time: 2024 7:30 PM    Performed by: EDMAR Amanda  Authorized by: EDMAR Amanda  Body area: upper extremity  Location details: left elbow  Wound length (cm): 2.5 cm (left side of V) x 3 cm (right side of V)  Foreign bodies: no foreign bodies  Tendon involvement: none  Nerve involvement: none  Vascular damage: no  Anesthesia: local infiltration    Anesthesia:  Local Anesthetic: lidocaine 1% without epinephrine  Anesthetic total: 10 mL    Sedation:  Patient sedated: no      Wound Dehiscence:  Superficial Wound Dehiscence: simple closure      Procedure Details:  Preparation: Patient was prepped and draped in the usual sterile fashion.  Irrigation solution: saline  Irrigation method: tap  Amount of cleaning: extensive  Debridement: none  Degree of undermining: none  Skin closure: Ethilon and 4-0 nylon  Number of sutures: 17  Technique: simple  Approximation: close  Approximation difficulty: simple  Dressing: antibiotic ointment  Patient tolerance: patient tolerated  the procedure well with no immediate complications        Patient Instructions   Take antibiotics as prescribed.   Take entire course of antibiotics.     Eat yogurt with live and active cultures and/or take a probiotic at least 3 hours before or after antibiotic dose.   Monitor stool for diarrhea and/or blood. If this occurs, contact primary care doctor ASAP.     Keep the stitches clean and dry.    Do not get the stitches wet for the 1st 24 hours.    The stitches should be covered with a clean dressing daily and a small coating of antibiotic ointment.  Stitches will come out in 7-10 days depending on the part of the body where the stitches have been placed.    Watch for signs of infection such as increased swelling, increased redness, pus or drainage from the wound, or red streaking.    If this develops or you develop any fever, chills, aches, joint pain, swelling, headache, dizziness, numbness or any new or concerning symptoms please proceed to ER.    Follow up with PCP in 3-5 days.  Proceed to ER if symptoms worsen.    If tests are performed, our office will contact you with results only if changes need to made to the care plan discussed with you at the visit. You can review your full results on [facility].    Chief Complaint     Chief Complaint   Patient presents with    Elbow Injury    Laceration     Patient states today while she was walking on escalator and fell causing left elbow to hit off escalator; denies head strike loc; bleeding controlled but significant laceration left arm; patient received tdap 2019      History of Present Illness       Pt is a 65 y/o F who presents to the clinic for a CC of a L elbow laceration. Pt reports she was walking on an escalator, fell striking L elbow, denies head strike or LOC. Pt states she had a hard time bending her elbow and thought she broke something initially then realized she had a laceration. Last Tdap per chart review 12/20/2019.       Review of Systems    Review of Systems   Constitutional: Negative.    Respiratory: Negative.  Negative for cough, shortness of breath and wheezing.    Cardiovascular: Negative.  Negative for chest pain and palpitations.   Skin:  Positive for wound.     Current Medications       Current Outpatient Medications:     cephalexin (KEFLEX) 500 mg capsule, Take 1 capsule (500 mg total) by mouth every 8 (eight) hours for 7 days, Disp: 21 capsule, Rfl: 0    alendronate (FOSAMAX) 70 mg tablet, Take once every week on empty stomach with water and stay upright for 1/2 hour after taking the medication, Disp: 12 tablet, Rfl: 0    amphetamine-dextroamphetamine (ADDERALL) 20 mg tablet, Take 1 tablet (20 mg total) by mouth daily Max Daily Amount: 20 mg, Disp: 90 tablet, Rfl: 0    calcium carbonate (OS-IVONE) 600 MG tablet, Take 600 mg by mouth 2 (two) times a day with meals, Disp: , Rfl:     Cholecalciferol (VITAMIN D) 2000 units CAPS, Take by mouth Take 5000IU daily, Disp: , Rfl:     Multiple Vitamin (MULTI-VITAMIN DAILY) TABS, Take by mouth, Disp: , Rfl:     ofloxacin (OCUFLOX) 0.3 % ophthalmic solution, Administer 1 drop to the right eye 4 (four) times a day (Patient not taking: Reported on 3/12/2024), Disp: 5 mL, Rfl: 0    sertraline (ZOLOFT) 100 mg tablet, Take 1 tablet (100 mg total) by mouth daily at bedtime, Disp: 90 tablet, Rfl: 2    Current Allergies     Allergies as of 09/07/2024    (No Known Allergies)            The following portions of the patient's history were reviewed and updated as appropriate: allergies, current medications, past family history, past medical history, past social history, past surgical history and problem list.     Past Medical History:   Diagnosis Date    ADHD     Anxiety     Arthritis     Asthma     Breast cancer ([facility]) 01/01/2012    Cancer ([facility]) 08/2012    DCIS-right breast    Closed fracture of radial styloid     CPAP (continuous positive airway pressure) dependence     Endometriosis     Family history of colon  cancer in mother     Forceps delivery     1991 daughter    GERD (gastroesophageal reflux disease)     H/O mitral valve prolapse     no regurgitation    Hiatal hernia     History of radiation therapy     Hyperlipidemia     Infertility, female     Normal delivery     1998 daughter    Sleep apnea     on cpap    TB lung, latent     treated with INH (in her 30's)       Past Surgical History:   Procedure Laterality Date    BREAST BIOPSY Right 04/01/2012    biopsy breast percutaneous needle core    BREAST LUMPECTOMY Right 08/01/2012    CHOLECYSTECTOMY  01/23/2013    COLONOSCOPY      DILATION AND CURETTAGE OF UTERUS      ENDOMETRIAL BIOPSY      without cervical dilation    KNEE ARTHROSCOPY      Plica syndrome    LAPAROSCOPY      Exploratory 1990 & 1994    SALUD-EN-Y PROCEDURE  01/23/2013    Dr Crawford    UPPER GASTROINTESTINAL ENDOSCOPY      US GUIDANCE  5/14/2013       Family History   Problem Relation Age of Onset    Colon cancer Mother 50    Cancer Mother         colon    Osteoporosis Mother     Stroke Father     Hypertension Father     Cancer Sister 71        spinal cancer    Kidney cancer Sister 70    No Known Problems Daughter     No Known Problems Daughter     No Known Problems Maternal Grandmother     Colon cancer Maternal Grandfather 80    Cancer Maternal Grandfather         colon    Hypertension Paternal Grandmother     Stroke Paternal Grandmother     No Known Problems Paternal Grandfather     No Known Problems Paternal Aunt     Hypertension Family     Mitral valve prolapse Family     Osteoporosis Family     Varicose Veins Family          Medications have been verified.        Objective   /70   Pulse 80   Temp 98 °F (36.7 °C) (Temporal)   Resp 18   Wt 63.5 kg (140 lb)   SpO2 99%   BMI 27.34 kg/m²        Physical Exam     Physical Exam  Vitals and nursing note reviewed.   Constitutional:       General: She is not in acute distress.     Appearance: Normal appearance. She is not ill-appearing,  toxic-appearing or diaphoretic.   HENT:      Head: Normocephalic.   Cardiovascular:      Rate and Rhythm: Normal rate and regular rhythm.      Pulses: Normal pulses.      Heart sounds: Normal heart sounds. No murmur heard.  Pulmonary:      Effort: Pulmonary effort is normal. No respiratory distress.      Breath sounds: Normal breath sounds. No stridor. No wheezing, rhonchi or rales.   Chest:      Chest wall: No tenderness.   Musculoskeletal:         General: Normal range of motion.   Skin:     General: Skin is warm.      Findings: Laceration (L elbow, see attached photos for pre and post laceration repair) present.          Neurological:      Mental Status: She is alert.

## 2024-09-08 RX ORDER — CEPHALEXIN 500 MG/1
500 CAPSULE ORAL EVERY 8 HOURS SCHEDULED
Qty: 21 CAPSULE | Refills: 0 | Status: SHIPPED | OUTPATIENT
Start: 2024-09-08 | End: 2024-09-15

## 2024-09-09 ENCOUNTER — ANESTHESIA EVENT (OUTPATIENT)
Dept: PERIOP | Facility: AMBULARY SURGERY CENTER | Age: 64
End: 2024-09-09
Payer: COMMERCIAL

## 2024-09-10 ENCOUNTER — TELEPHONE (OUTPATIENT)
Age: 64
End: 2024-09-10

## 2024-09-10 NOTE — TELEPHONE ENCOUNTER
Xray is normal no osseous abnormality and   some air in tissue consistent with  laceration  I dont see why she needs a CT elbow  I never have ordered a CT of elbow

## 2024-09-10 NOTE — TELEPHONE ENCOUNTER
Pt didn't ask for CT of Elbow. I read the message back to her and she stated she may had hit her head a lil fatigue since she had fallen. She is not worried about a CT of elbow or suture removal. She just was told to make a follow up with her family doctor to be checked again. I explained to her give us a call back if she gets worse. At the moment she feels fine

## 2024-09-10 NOTE — PATIENT INSTRUCTIONS
Take antibiotics as prescribed.   Take entire course of antibiotics.     Eat yogurt with live and active cultures and/or take a probiotic at least 3 hours before or after antibiotic dose.   Monitor stool for diarrhea and/or blood. If this occurs, contact primary care doctor ASAP.     Keep the stitches clean and dry.    Do not get the stitches wet for the 1st 24 hours.    The stitches should be covered with a clean dressing daily and a small coating of antibiotic ointment.  Stitches will come out in 7-10 days depending on the part of the body where the stitches have been placed.    Watch for signs of infection such as increased swelling, increased redness, pus or drainage from the wound, or red streaking.    If this develops or you develop any fever, chills, aches, joint pain, swelling, headache, dizziness, numbness or any new or concerning symptoms please proceed to ER.    Follow up with PCP in 3-5 days.  Proceed to ER if symptoms worsen.    If tests are performed, our office will contact you with results only if changes need to made to the care plan discussed with you at the visit. You can review your full results on [facility].

## 2024-09-10 NOTE — TELEPHONE ENCOUNTER
I can't see her today. If patient calls early tomorrow, they should be able to get her in with one of us.

## 2024-09-10 NOTE — TELEPHONE ENCOUNTER
Patient had a fall on Saturday night and ended up in the urgent care for stitches. She received 17 stitches and they told her to follow up with primary car to see if she could order a CT of the Left elbow. Please call patient back if she needs to come in for an appt.  Мария Delcid

## 2024-09-12 RX ORDER — OMEGA-3-ACID ETHYL ESTERS 1 G/1
2 CAPSULE, LIQUID FILLED ORAL 2 TIMES DAILY
COMMUNITY

## 2024-09-12 NOTE — PRE-PROCEDURE INSTRUCTIONS
Pre-Surgery Instructions:   Medication Instructions    alendronate (FOSAMAX) 70 mg tablet Q Thursday-weekly    amphetamine-dextroamphetamine (ADDERALL) 20 mg tablet Hold day of surgery.    calcium carbonate (OS-IVONE) 600 MG tablet Stop taking 7 days prior to surgery.    cephalexin (KEFLEX) 500 mg capsule Will be completed    Cholecalciferol (VITAMIN D) 2000 units CAPS Stop taking 7 days prior to surgery.    Multiple Vitamin (MULTI-VITAMIN DAILY) TABS Stop taking 7 days prior to surgery.    omega-3-acid ethyl esters (LOVAZA) 1 g capsule Stop taking 7 days prior to surgery.    sertraline (ZOLOFT) 100 mg tablet Take night before surgery   Medication instructions for day surgery reviewed. Please use only a sip of water to take your instructed medications. Avoid all over the counter vitamins, supplements and NSAIDS for one week prior to surgery per anesthesia guidelines. Tylenol is ok to take as needed.     You will receive a call one business day prior to surgery with an arrival time and hospital directions. If your surgery is scheduled on a Monday, the hospital will be calling you on the Friday prior to your surgery. If you have not heard from anyone by 8pm, please call the hospital supervisor through the hospital  at [phone]. or [place] [phone]).    Do not eat or drink anything after midnight the night before your surgery, including candy, mints, lifesavers, or chewing gum. Do not drink alcohol 24hrs before your surgery. Try not to smoke at least 24hrs before your surgery.       Follow the pre surgery showering instructions as listed in the “My Surgical Experience Booklet” or otherwise provided by your surgeon's office. Do not use a blade to shave the surgical area 1 week before surgery. It is okay to use a clean electric clippers up to 24 hours before surgery. Do not apply any lotions, creams, including makeup, cologne, deodorant, or perfumes after showering on the day of your surgery. Do not use  dry shampoo, hair spray, hair gel, or any type of hair products.     No contact lenses, eye make-up, or artificial eyelashes. Remove nail polish, including gel polish, and any artificial, gel, or acrylic nails if possible. Remove all jewelry including rings and body piercing jewelry.     Wear causal clothing that is easy to take on and off. Consider your type of surgery.    Keep any valuables, jewelry, piercings at home. Please bring any specially ordered equipment (sling, braces) if indicated.    Arrange for a responsible person to drive you to and from the hospital on the day of your surgery. Please confirm the visitor policy for the day of your procedure when you receive your phone call with an arrival time.     Call the surgeon's office with any new illnesses, exposures, or additional questions prior to surgery.    Please reference your “My Surgical Experience Booklet” for additional information to prepare for your upcoming surgery.

## 2024-09-17 ENCOUNTER — OFFICE VISIT (OUTPATIENT)
Dept: FAMILY MEDICINE CLINIC | Facility: CLINIC | Age: 64
End: 2024-09-17
Payer: COMMERCIAL

## 2024-09-17 VITALS
RESPIRATION RATE: 16 BRPM | TEMPERATURE: 97.9 F | DIASTOLIC BLOOD PRESSURE: 56 MMHG | WEIGHT: 138.6 LBS | HEIGHT: 60 IN | OXYGEN SATURATION: 97 % | SYSTOLIC BLOOD PRESSURE: 100 MMHG | BODY MASS INDEX: 27.21 KG/M2 | HEART RATE: 62 BPM

## 2024-09-17 DIAGNOSIS — S50.02XD CONTUSION OF LEFT ELBOW, SUBSEQUENT ENCOUNTER: ICD-10-CM

## 2024-09-17 DIAGNOSIS — S51.012D LACERATION OF LEFT ELBOW, SUBSEQUENT ENCOUNTER: ICD-10-CM

## 2024-09-17 DIAGNOSIS — Z23 ENCOUNTER FOR IMMUNIZATION: ICD-10-CM
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
## 2024-09-17 DIAGNOSIS — G47.33 OSA ON CPAP: ICD-10-CM

## 2024-09-17 DIAGNOSIS — F90.9 ATTENTION DEFICIT HYPERACTIVITY DISORDER (ADHD), UNSPECIFIED ADHD TYPE: Primary | ICD-10-CM

## 2024-09-17 DIAGNOSIS — M81.8 OTHER OSTEOPOROSIS WITHOUT CURRENT PATHOLOGICAL FRACTURE: ICD-10-CM

## 2024-09-17 PROCEDURE — 90471 IMMUNIZATION ADMIN: CPT | Performed by: FAMILY MEDICINE

## 2024-09-17 PROCEDURE — 99214 OFFICE O/P EST MOD 30 MIN: CPT | Performed by: FAMILY MEDICINE

## 2024-09-17 PROCEDURE — 90750 HZV VACC RECOMBINANT IM: CPT | Performed by: FAMILY MEDICINE

## 2024-09-17 RX ORDER — DEXTROAMPHETAMINE SACCHARATE, AMPHETAMINE ASPARTATE, DEXTROAMPHETAMINE SULFATE AND AMPHETAMINE SULFATE 5; 5; 5; 5 MG/1; MG/1; MG/1; MG/1
20 TABLET ORAL DAILY
Qty: 90 TABLET | Refills: 0 | Status: SHIPPED | OUTPATIENT
Start: 2024-09-17

## 2024-09-17 RX ORDER — ALENDRONATE SODIUM 70 MG/1
70 TABLET ORAL
Qty: 12 TABLET | Refills: 3 | Status: SHIPPED | OUTPATIENT
Start: 2024-09-17

## 2024-09-17 NOTE — PROGRESS NOTES
Subjective:      Patient ID: Lydia Orellana is a 64 y.o. female.    Here for follow up -  Hyperlipidemia: following dietary instructions, trying to lose weight,baseline 138 lbs  Depression and anxiety- controlled on zoloft 100 mg qd,   ADHD-   Pt takes adderal 20 mg qd and is doing well , does not need long acting  Obstructive sleep apnea- controlled, plans on doing Inspire procedure  Parathyroid lesion -3 mm, saw endocrine, monitoring PTH, VIT d and calcium  Osteoporosis- started on weekly alendronate , denies any side effects, taking calcium and vit d supplements, needs refils  History of gastric bypass- taking multivitamin supplements   No cp/sob. No headaches.No GI upset or insomnia. No palpitations.   Fell on an escalator on 9/7/2024, was seen at urgent care for a laceration and had 17 sutures, needs them removed          Past Medical History:   Diagnosis Date    ADHD     Anxiety     Arthritis     Asthma     Breast cancer ([facility]) 01/01/2012    Cancer ([facility]) 08/2012    DCIS-right breast    Closed fracture of radial styloid     Endometriosis     Family history of colon cancer in mother     Forceps delivery     1991 daughter    GERD (gastroesophageal reflux disease)     H/O mitral valve prolapse     no regurgitation    Hiatal hernia     History of radiation therapy     Hyperlipidemia     Infertility, female     Normal delivery     1998 daughter    Sleep apnea     on cpap    TB lung, latent     treated with INH (in her 30's)       Family History   Problem Relation Age of Onset    Colon cancer Mother 50    Cancer Mother         colon    Osteoporosis Mother     Stroke Father     Hypertension Father     Cancer Sister 71        spinal cancer    Kidney cancer Sister 70    No Known Problems Daughter     No Known Problems Daughter     No Known Problems Maternal Grandmother     Colon cancer Maternal Grandfather 80    Cancer Maternal Grandfather         colon    Hypertension Paternal Grandmother     Stroke Paternal Grandmother      No Known Problems Paternal Grandfather     No Known Problems Paternal Aunt     Hypertension Family     Mitral valve prolapse Family     Osteoporosis Family     Varicose Veins Family        Past Surgical History:   Procedure Laterality Date    BREAST BIOPSY Right 04/01/2012    biopsy breast percutaneous needle core    BREAST LUMPECTOMY Right 08/01/2012    CHOLECYSTECTOMY  01/23/2013    COLONOSCOPY      DILATION AND CURETTAGE OF UTERUS      ENDOMETRIAL BIOPSY      without cervical dilation    KNEE ARTHROSCOPY      Plica syndrome    LAPAROSCOPY      Exploratory 1990 & 1994    SALUD-EN-Y PROCEDURE  01/23/2013    Dr Crawford    UPPER GASTROINTESTINAL ENDOSCOPY      US GUIDANCE  5/14/2013        reports that she has never smoked. She has never used smokeless tobacco. She reports current alcohol use. She reports that she does not use drugs.      Current Outpatient Medications:     alendronate (FOSAMAX) 70 mg tablet, Take 1 tablet (70 mg total) by mouth every 7 days Take once every week on empty stomach with water and stay upright for 1/2 hour after taking the medication, Disp: 12 tablet, Rfl: 3    amphetamine-dextroamphetamine (ADDERALL) 20 mg tablet, Take 1 tablet (20 mg total) by mouth daily Max Daily Amount: 20 mg, Disp: 90 tablet, Rfl: 0    calcium carbonate (OS-IVONE) 600 MG tablet, Take 600 mg by mouth 2 (two) times a day with meals, Disp: , Rfl:     Cholecalciferol (VITAMIN D) 2000 units CAPS, Take by mouth Take 5000IU daily, Disp: , Rfl:     Multiple Vitamin (MULTI-VITAMIN DAILY) TABS, Take by mouth, Disp: , Rfl:     omega-3-acid ethyl esters (LOVAZA) 1 g capsule, Take 2 g by mouth 2 (two) times a day, Disp: , Rfl:     sertraline (ZOLOFT) 100 mg tablet, Take 1 tablet (100 mg total) by mouth daily at bedtime, Disp: 90 tablet, Rfl: 2    The following portions of the patient's history were reviewed and updated as appropriate: allergies, current medications, past family history, past medical history, past social  history, past surgical history and problem list.    Review of Systems   Constitutional:  Negative for fatigue and fever.   HENT:  Negative for congestion, facial swelling, mouth sores, rhinorrhea, sore throat and trouble swallowing.    Eyes:  Negative for pain and redness.   Respiratory:  Negative for cough, shortness of breath and wheezing.    Cardiovascular:  Negative for chest pain, palpitations and leg swelling.   Gastrointestinal:  Negative for abdominal pain, blood in stool, constipation, diarrhea and nausea.   Genitourinary:  Negative for dysuria, hematuria and urgency.   Musculoskeletal:  Negative for arthralgias, back pain and myalgias.   Skin:  Positive for wound. Negative for rash.   Neurological:  Negative for seizures, syncope and headaches.   Hematological:  Negative for adenopathy.   Psychiatric/Behavioral:  Negative for agitation and behavioral problems.          PHQ-2/9 Depression Screening    Little interest or pleasure in doing things: 0 - not at all  Feeling down, depressed, or hopeless: 0 - not at all  Trouble falling or staying asleep, or sleeping too much: 0 - not at all  Feeling tired or having little energy: 0 - not at all  Poor appetite or overeatin - not at all  Feeling bad about yourself - or that you are a failure or have let yourself or your family down: 0 - not at all  Trouble concentrating on things, such as reading the newspaper or watching television: 0 - not at all  Moving or speaking so slowly that other people could have noticed. Or the opposite - being so fidgety or restless that you have been moving around a lot more than usual: 0 - not at all  Thoughts that you would be better off dead, or of hurting yourself in some way: 0 - not at all  PHQ-9 Score: 0  PHQ-9 Interpretation: No or Minimal depression             Objective:    /56 (BP Location: Left arm, Patient Position: Sitting, Cuff Size: Adult)   Pulse 62   Temp 97.9 °F (36.6 °C) (Tympanic)   Resp 16   Ht 5'  (1.524 m)   Wt 62.9 kg (138 lb 9.6 oz)   SpO2 97%   BMI 27.07 kg/m²      Physical Exam  Vitals and nursing note reviewed.   Constitutional:       Appearance: Normal appearance. She is well-developed. She is obese. She is not ill-appearing.   HENT:      Head: Normocephalic and atraumatic.      Right Ear: External ear normal.      Left Ear: External ear normal.      Nose: Nose normal.      Mouth/Throat:      Mouth: Mucous membranes are moist.      Pharynx: No oropharyngeal exudate or posterior oropharyngeal erythema.   Eyes:      General: No scleral icterus.        Right eye: No discharge.         Left eye: No discharge.      Conjunctiva/sclera: Conjunctivae normal.   Cardiovascular:      Rate and Rhythm: Normal rate.      Heart sounds: No murmur heard.     No gallop.   Pulmonary:      Effort: Pulmonary effort is normal. No respiratory distress.      Breath sounds: Normal breath sounds. No stridor. No wheezing, rhonchi or rales.   Abdominal:      Palpations: Abdomen is soft.      Tenderness: There is no abdominal tenderness.   Musculoskeletal:         General: Tenderness present. No deformity.      Right lower leg: No edema.      Left lower leg: No edema.   Skin:     Findings: Erythema and wound present. No rash.          Neurological:      Mental Status: She is alert. Mental status is at baseline.   Psychiatric:         Behavior: Behavior normal.         Judgment: Judgment normal.           Recent Results (from the past 8736 hour(s))   Comprehensive metabolic panel    Collection Time: 11/29/23  8:21 AM   Result Value Ref Range    Sodium 139 135 - 147 mmol/L    Potassium 4.0 3.5 - 5.3 mmol/L    Chloride 107 96 - 108 mmol/L    CO2 26 21 - 32 mmol/L    ANION GAP 6 mmol/L    BUN 20 5 - 25 mg/dL    Creatinine 0.74 0.60 - 1.30 mg/dL    Glucose, Fasting 89 65 - 99 mg/dL    Calcium 8.9 8.4 - 10.2 mg/dL    AST 17 13 - 39 U/L    ALT 16 7 - 52 U/L    Alkaline Phosphatase 48 34 - 104 U/L    Total Protein 6.8 6.4 - 8.4 g/dL     Albumin 4.3 3.5 - 5.0 g/dL    Total Bilirubin 0.57 0.20 - 1.00 mg/dL    eGFR 85 ml/min/1.73sq m   Liquid-based pap, screening    Collection Time: 12/18/23  8:54 AM   Result Value Ref Range    Case Report       Gynecologic Cytology Report                       Case: [id number]                                  Authorizing Provider:  Trice Garcia MD     Collected:           12/18/2023 0854              Ordering Location:     [facility]  Received:            12/18/2023 0854                                     Health                                                                       First Screen:          Guillaume Lewis                                                                 Specimen:    LIQUID-BASED PAP, SCREENING, Cervix, Endocervical                                          Primary Interpretation Negative for intraepithelial lesion or malignancy     Specimen Adequacy       Satisfactory for evaluation. Endocervical/transformation zone component present.    Additional Information       [employer]'s FDA approved ,  and ThinPrep Imaging Duo System are utilized with strict adherence to the 's instruction manual to prepare gynecologic and non-gynecologic cytology specimens for the production of ThinPrep slides as well as for gynecologic ThinPrep imaging. These processes have been validated by our laboratory and/or by the .  The Pap test is not a diagnostic procedure and should not be used as the sole means to detect cervical cancer. It is only a screening procedure to aid in the detection of cervical cancer and its precursors. Both false-negative and false-positive results have been experienced. Your patient's test result should be interpreted in this context together with the history and clinical findings.     HPV High Risk    Collection Time: 12/18/23  8:54 AM    Specimen: Thin-Prep Vial   Result Value Ref Range    HPV Other HR Negative Negative    HPV16  Negative Negative    HPV18 Negative Negative   [facility] Molecular Screen    Collection Time: 01/11/24  4:45 PM    Specimen: Arm, Right; Blood   Result Value Ref Range    GENETIC ANALYSIS OVERALL INTERPRETATION TNP    [facility] Molecular Screen    Collection Time: 03/07/24  3:22 PM    Specimen: Blood   Result Value Ref Range    DILLARD SYNDROME DNA ANALYSIS Not Detected     HEREDITARY BREAST & OVARIAN CANCER DNA ANALYSIS Not Detected     FAMILIAL HYPERCHOLESTEROLEMIA DNA ANALYSIS Not Detected    Hemoglobin A1C    Collection Time: 04/01/24  9:34 AM   Result Value Ref Range    Hemoglobin A1C 5.3 Normal 4.0-5.6%; PreDiabetic 5.7-6.4%; Diabetic >=6.5%; Glycemic control for adults with diabetes <7.0% %     mg/dl   Lipid panel    Collection Time: 04/01/24  9:34 AM   Result Value Ref Range    Cholesterol 233 (H) See Comment mg/dL    Triglycerides 111 See Comment mg/dL    HDL, Direct 70 >=50 mg/dL    LDL Calculated 141 (H) 0 - 100 mg/dL    Non-HDL-Chol (CHOL-HDL) 163 mg/dl   CBC and differential    Collection Time: 09/04/24  8:03 AM   Result Value Ref Range    WBC 6.17 4.31 - 10.16 Thousand/uL    RBC 4.34 3.81 - 5.12 Million/uL    Hemoglobin 13.0 11.5 - 15.4 g/dL    Hematocrit 40.3 34.8 - 46.1 %    MCV 93 82 - 98 fL    MCH 30.0 26.8 - 34.3 pg    MCHC 32.3 31.4 - 37.4 g/dL    RDW 13.0 11.6 - 15.1 %    MPV 10.4 8.9 - 12.7 fL    Platelets 193 149 - 390 Thousands/uL    nRBC 0 /100 WBCs    Segmented % 56 43 - 75 %    Immature Grans % 0 0 - 2 %    Lymphocytes % 35 14 - 44 %    Monocytes % 8 4 - 12 %    Eosinophils Relative 1 0 - 6 %    Basophils Relative 0 0 - 1 %    Absolute Neutrophils 3.44 1.85 - 7.62 Thousands/µL    Absolute Immature Grans 0.02 0.00 - 0.20 Thousand/uL    Absolute Lymphocytes 2.16 0.60 - 4.47 Thousands/µL    Absolute Monocytes 0.48 0.17 - 1.22 Thousand/µL    Eosinophils Absolute 0.05 0.00 - 0.61 Thousand/µL    Basophils Absolute 0.02 0.00 - 0.10 Thousands/µL   Basic metabolic panel    Collection Time: 09/04/24   8:03 AM   Result Value Ref Range    Sodium 140 135 - 147 mmol/L    Potassium 4.0 3.5 - 5.3 mmol/L    Chloride 105 96 - 108 mmol/L    CO2 27 21 - 32 mmol/L    ANION GAP 8 4 - 13 mmol/L    BUN 17 5 - 25 mg/dL    Creatinine 0.74 0.60 - 1.30 mg/dL    Glucose, Fasting 92 65 - 99 mg/dL    Calcium 9.0 8.4 - 10.2 mg/dL    eGFR 85 ml/min/1.73sq m   ECG 12 lead    Collection Time: 09/04/24  8:08 AM   Result Value Ref Range    Ventricular Rate 54 BPM    Atrial Rate 54 BPM    CT Interval 176 ms    QRSD Interval 74 ms    QT Interval 458 ms    QTC Interval 434 ms    P Sheridan 48 degrees    QRS Axis 77 degrees    T Wave Axis 84 degrees       Laboratory Results: I have personally reviewed the pertinent laboratory results/reports     Radiology/Other Diagnostic Testing Results: Reviewed radiology reports from this admission including: Mammogram.    Mammo screening bilateral w 3d & cad    Addendum Date: 1/16/2024    Addendum: This is to correct the impression which should read as follows: IMPRESSION: No mammographic evidence of malignancy. ASSESSMENT/BI-RADS CATEGORY: Left: 2 - Benign Right: 2 - Benign Overall: 2 - Benign RECOMMENDATION:      - Routine screening mammogram in 1 year for both breasts. End addendum. Workstation ID: [id number]     Result Date: 1/16/2024  DIAGNOSIS: Visit for screening mammogram TECHNIQUE: Digital screening mammography was performed. Computer Aided Detection (CAD) analyzed all applicable images. COMPARISONS: Prior breast imaging dated: 12/27/2022, 12/23/2021, 12/17/2020, 12/11/2019, 06/04/2018, 05/15/2017, 05/12/2016, 05/11/2015, 05/01/2014, and 01/22/2014 RELEVANT HISTORY: Family Breast Cancer History: No known family history of breast cancer. Family Medical History: Family medical history includes colon cancer in 2 relatives (maternal grandfather, mother). Personal History: Hormone history includes birth control. Surgical history includes breast biopsy and lumpectomy. Medical history includes breast  cancer. The patient is scheduled in a reminder system for screening mammography. 8-10% of cancers will be missed on mammography. Management of a palpable abnormality must be based on clinical grounds.  Patients will be notified of their results via letter from our facility. Accredited by [employer] and FDA. RISK ASSESSMENT: GayleNicholas risk assessment reporting was suppressed due to the patient's history and/or demographic factors. TISSUE DENSITY: There are scattered areas of fibroglandular density. INDICATION: Lydia Orellana is a 63 y.o. female presenting for screening mammography. FINDINGS: Bilateral There are no suspicious masses, grouped microcalcifications or areas of unexplained architectural distortion. The skin and nipple areolar complex are unremarkable.  There are stable postsurgical changes in the right breast.  There are benign calcifications in both breasts.     Additional imaging required. A breast health care nurse from our facility will be contacting the patient regarding the need for additional imaging. ASSESSMENT/BI-RADS CATEGORY: Left: 2 - Benign Right: 2 - Benign Overall: 2 - Benign RECOMMENDATION:      - Routine screening mammogram in 1 year for both breasts. Workstation ID: [id number]        Assessment/Plan:  1. Attention deficit hyperactivity disorder (ADHD), unspecified ADHD type  -     amphetamine-dextroamphetamine (ADDERALL) 20 mg tablet; Take 1 tablet (20 mg total) by mouth daily Max Daily Amount: 20 mg  2. Encounter for immunization  -     Zoster Vaccine Recombinant IM  3. Other osteoporosis without current pathological fracture  -     alendronate (FOSAMAX) 70 mg tablet; Take 1 tablet (70 mg total) by mouth every 7 days Take once every week on empty stomach with water and stay upright for 1/2 hour after taking the medication  4. Laceration of left elbow, subsequent encounter  -     Suture removal  5. Contusion of left elbow, subsequent encounter  6. Fall, subsequent  "encounter  7. FREIDA on CPAP          Suture removal    Date/Time: 9/17/2024 8:20 AM    Performed by: Chayo Sales MD  Authorized by: Chayo Sales MD  Universal Protocol:  Timeout called at: 9/17/2024 8:40 AM.  Patient identity confirmed: verbally with patient      Patient location:  Clinic  Location:     Laterality:  Left    Location:  Upper extremity    Upper extremity location:  Elbow    Elbow location:  L elbow  Procedure details:     Tools used:  Scissors and suture removal kit    Wound appearance:  Purulent, red and moist    Number of sutures removed:  17  Post-procedure details:     Post-removal:  Antibiotic ointment applied    Patient tolerance of procedure:  Tolerated well, no immediate complications  Comments:      Partial wound dehiscence , discuss will need to let it heal by secondary intention.Use mederma cream for improving scar appearance once wound closes.             Read package inserts for all medications before starting a new medications, call me if you have any questions.    Patient was given opportunity to ask questions and all questions were answered.    Disclaimer: Portions of the record may have been created with voice recognition software. Occasional wrong word or \"sound a like\" substitutions may have occurred due to the inherent limitations of voice recognition software. Read the chart carefully and recognize, using context, where substitutions have occurred. I have used the Epic copy/forward function to compose this note. I have reviewed my current note to ensure it reflects the current patient status, exam, assessment and plan.    "

## 2024-09-23 ENCOUNTER — ANESTHESIA (OUTPATIENT)
Dept: PERIOP | Facility: AMBULARY SURGERY CENTER | Age: 64
End: 2024-09-23
Payer: COMMERCIAL

## 2024-09-23 ENCOUNTER — HOSPITAL ENCOUNTER (OUTPATIENT)
Facility: AMBULARY SURGERY CENTER | Age: 64
Setting detail: OUTPATIENT SURGERY
Discharge: HOME | End: 2024-09-23
Attending: OTOLARYNGOLOGY | Admitting: OTOLARYNGOLOGY
Payer: COMMERCIAL

## 2024-09-23 VITALS
RESPIRATION RATE: 16 BRPM | TEMPERATURE: 98 F | HEIGHT: 60 IN | HEART RATE: 60 BPM | DIASTOLIC BLOOD PRESSURE: 56 MMHG | OXYGEN SATURATION: 97 % | SYSTOLIC BLOOD PRESSURE: 101 MMHG | WEIGHT: 135 LBS | BODY MASS INDEX: 26.5 KG/M2

## 2024-09-23 DIAGNOSIS — G47.33 OBSTRUCTIVE SLEEP APNEA: Primary | ICD-10-CM

## 2024-09-23 PROCEDURE — 42975 DISE EVAL SLP DO BRTH FLX DX: CPT | Performed by: OTOLARYNGOLOGY

## 2024-09-23 RX ORDER — PROPOFOL 10 MG/ML
INJECTION, EMULSION INTRAVENOUS AS NEEDED
Status: DISCONTINUED | OUTPATIENT
Start: 2024-09-23 | End: 2024-09-23

## 2024-09-23 RX ORDER — PROPOFOL 10 MG/ML
INJECTION, EMULSION INTRAVENOUS CONTINUOUS PRN
Status: DISCONTINUED | OUTPATIENT
Start: 2024-09-23 | End: 2024-09-23

## 2024-09-23 RX ORDER — SODIUM CHLORIDE, SODIUM LACTATE, POTASSIUM CHLORIDE, CALCIUM CHLORIDE 600; 310; 30; 20 MG/100ML; MG/100ML; MG/100ML; MG/100ML
125 INJECTION, SOLUTION INTRAVENOUS CONTINUOUS
Status: DISCONTINUED | OUTPATIENT
Start: 2024-09-23 | End: 2024-09-23 | Stop reason: HOSPADM

## 2024-09-23 RX ORDER — SENNOSIDES 8.6 MG
650 CAPSULE ORAL EVERY 8 HOURS PRN
Qty: 30 TABLET | Refills: 0 | Status: SHIPPED | OUTPATIENT
Start: 2024-09-23

## 2024-09-23 RX ORDER — LIDOCAINE HYDROCHLORIDE 10 MG/ML
INJECTION, SOLUTION EPIDURAL; INFILTRATION; INTRACAUDAL; PERINEURAL AS NEEDED
Status: DISCONTINUED | OUTPATIENT
Start: 2024-09-23 | End: 2024-09-23

## 2024-09-23 RX ADMIN — PROPOFOL 100 MCG/KG/MIN: 10 INJECTION, EMULSION INTRAVENOUS at 09:33

## 2024-09-23 RX ADMIN — PROPOFOL 50 MG: 10 INJECTION, EMULSION INTRAVENOUS at 09:33

## 2024-09-23 RX ADMIN — SODIUM CHLORIDE, SODIUM LACTATE, POTASSIUM CHLORIDE, AND CALCIUM CHLORIDE: .6; .31; .03; .02 INJECTION, SOLUTION INTRAVENOUS at 08:49

## 2024-09-23 RX ADMIN — LIDOCAINE HYDROCHLORIDE 50 MG: 10 INJECTION, SOLUTION EPIDURAL; INFILTRATION; INTRACAUDAL at 09:33

## 2024-09-23 NOTE — ANESTHESIA POSTPROCEDURE EVALUATION
Post-Op Assessment Note    CV Status:  Stable  Pain Score: 0    Pain management: adequate       Mental Status:  Awake and somnolent   Hydration Status:  Stable   PONV Controlled:  None   Airway Patency:  Patent     Post Op Vitals Reviewed: Yes    No anethesia notable event occurred.    Staff: YANET               /61 (09/23/24 0940)    Temp 98.6 °F (37 °C) (09/23/24 0940)    Pulse (!) 52 (09/23/24 0940)   Resp 15 (09/23/24 0940)    SpO2   100% on FM

## 2024-09-23 NOTE — H&P
Lydia Orellana is a 64 y.o.female who presents for re-evaluation of FREIDA. HST performed on 6/26/24 demonstrated an AHI of 30. Has been on CPAP for 25 years, unable to tolerate due to mask/pressure and irritation to nose. She has recently switched to dental appliance. Previous AHI approx 80. She did have a 70 lb weight loss but did have subsequent sleep study that continued to show sleep apnea. No nasal obstruction. No previous nasal surgery.      Past Medical History:   Diagnosis Date    ADHD     Anxiety     Arthritis     Asthma     Breast cancer ([facility]) 01/01/2012    Cancer ([facility]) 08/2012    DCIS-right breast    Closed fracture of radial styloid     Endometriosis     Family history of colon cancer in mother     Forceps delivery     1991 daughter    GERD (gastroesophageal reflux disease)     H/O mitral valve prolapse     no regurgitation    Hiatal hernia     History of radiation therapy     Hyperlipidemia     Infertility, female     Normal delivery     1998 daughter    Sleep apnea     on cpap    TB lung, latent     treated with INH (in her 30's)       /60   Pulse 70   Temp (!) 96.8 °F (36 °C) (Temporal)   Resp 16   Ht 5' (1.524 m)   Wt 61.2 kg (135 lb)   SpO2 98%   BMI 26.37 kg/m²       Physical Exam   Constitutional: Oriented to person, place, and time. Well-developed and well-nourished, no apparent distress, non-toxic appearance. Cooperative, able to hear and answer questions without difficulty.    Voice: Normal voice quality.  Head: Normocephalic, atraumatic.  No scars, masses or lesions.  Face: Symmetric, no edema, no sinus tenderness.  Eyes: Vision grossly intact, extra-ocular movement intact.  Ears: External ear normal.  Bilateral tympanic membranes are intact with intact normal landmarks. No post-auricular erythema or tenderness.  Nose: Septum midline, nares clear.  Mucosa moist, turbinates well appearing.  No crusting, polyps or discharge evident.  Oral cavity: Dentition intact.  Mucosa moist, lips  normal.  Tongue mobile, floor of mouth normal.  Hard palate unremarkable.  No masses or lesions.   Oropharynx: Uvula is midline, soft palate normal.  Unremarkable oropharyngeal inlet.  Tonsils unremarkable.  Posterior pharyngeal wall clear. No masses or lesions.  Salivary glands:  Parotid glands and submandibular glands symmetric, no enlargement or tenderness.  Neck: Normal laryngeal elevation with swallow.  Trachea midline.  No masses or lesions. No palpable adenopathy.  Thyroid: normal in size, unremarkable without tenderness or palpable nodules.  Pulmonary/Chest: Normal effort and rate. No respiratory distress.   Musculoskeletal: Normal range of motion.   Neurological: Cranial nerves 2-12 intact.  Skin: Skin is warm and dry.   Psychiatric: Normal mood and affect.        A/P: Obstructive sleep apnea: We discussed the nature of obstructive sleep apnea.  We discussed the natural history of sleep apnea. We discussed options for management. We discussed non-surgical management including weight loss, mandibular advancement devices, and positive airway pressure therapy including various options. We discussed that she is not tolerating her CPAP and has at least moderate FREIDA with an AHI of 30.0 and BMI of 26, she would like to move forward with Drug Induced Sleep Endoscopy to evaluate the source of the obstructions. We will plan for DISE and if necessary repeat sleep study if one has not been performed within 3 years. If surgical treatable causes of sleep apnea are seen such as tongue base laxity, we will consider options for surgical treatment. After the procedure we will further discuss surgical and non-surgical options, if necessary, at that time.

## 2024-09-23 NOTE — ANESTHESIA PREPROCEDURE EVALUATION
Procedure:  DRUG INDUCED SLEEP ENDOSCOPY (Nose)    Relevant Problems   CARDIO   (+) Hypercholesterolemia      GI/HEPATIC   (+) Dysphagia      NEURO/PSYCH   (+) Mixed anxiety and depressive disorder      PULMONARY   (+) Obstructive sleep apnea        Physical Exam    Airway    Mallampati score: III    Neck ROM: full     Dental   No notable dental hx     Cardiovascular      Pulmonary      Other Findings  post-pubertal.      Anesthesia Plan  ASA Score- 2     Anesthesia Type- IV sedation with anesthesia with ASA Monitors.         Additional Monitors:     Airway Plan:            Plan Factors-Exercise tolerance (METS): >4 METS.    Chart reviewed.    Patient summary reviewed.    Patient is not a current smoker.      Obstructive sleep apnea risk education given perioperatively.        Induction- intravenous.    Postoperative Plan-     Perioperative Resuscitation Plan - Level 1 - Full Code.       Informed Consent- Anesthetic plan and risks discussed with patient.  I personally reviewed this patient with the CRNA. Discussed and agreed on the Anesthesia Plan with the CRNA..

## 2024-09-23 NOTE — DISCHARGE INSTR - AVS FIRST PAGE
Drug Induced Sleep Endoscopy     WHAT YOU SHOULD KNOW:   During a drug induced sleep endsocopy (DISE), your caregiver places a scope into your nose to see inside your nose and throat. You may need a DISE to find the cause of your sleep apnea. DISE helps your caregiver diagnose your condition and create a treatment plan. You might also have surgery or other treatments during a DISE.    AFTER YOU LEAVE:     Follow up with your primary healthcare provider or specialist as directed:  Write down your questions so you remember to ask them during your visits.     Medicines:   Keep a current list of your medicines:  Include the amounts, and when, how, and why you take them. Take the list or the pill bottles to follow-up visits. Carry your medicine list with you in case of an emergency. Throw away old medicine lists. Use vitamins, herbs, or food supplements only as directed.    Take your medicine as directed:  Call your healthcare provider if you think your medicine is not working as expected. Tell him about any medicine allergies, and if you want to quit taking or change your medicine.    Nutrition:  Most people eat and drink as usual after a laryngoscopy. Ask your primary healthcare provider if you are not sure.    Contact your primary healthcare provider if:  You have problems breathing or talking.    You see new injuries to your teeth, lips, or tongue.    Your pain does not go away or gets worse.    You have questions about your procedure, medicine, or care.    If you have any questions or concerns during business hours please call our office at [phone]. After hours please call the surgeon on call or Dr. Malone at [phone]

## 2024-09-23 NOTE — OP NOTE
OPERATIVE REPORT  PATIENT NAME: Lydia Orellana    :  1960  MRN: [id number]  Pt Location: [facility] OR [facility] 05    SURGERY DATE: 2024    Surgeons and Role:     * Otis Malone MD - Primary    Preop Diagnosis:  Obstructive sleep apnea (adult) (pediatric) [G47.33]    Post-Op Diagnosis Codes:     * Obstructive sleep apnea (adult) (pediatric) [G47.33]    Procedure(s):  DRUG INDUCED SLEEP ENDOSCOPY    Specimen(s):  * No specimens in log *    Estimated Blood Loss:   Minimal    Drains:  * No LDAs found *    Anesthesia Type:   General    Operative Indications:  Obstructive sleep apnea (adult) (pediatric) [G47.33]  BMI 26    Operative Findings:  V 2 AP  O 1 AP  T 2 AP  E 2 AP      Complications:   None    Procedure and Technique:    PREOPERATIVE DIAGNOSES: Obstructive sleep apnea with positive pressure   intolerance and persistent sleep apnea after CPAP trial    POSTOPERATIVE DIAGNOSES: Same    PROCEDURES: Drug-induced sleep endoscopy (flexible fiberoptic laryngoscopy   with examination under anesthesia).     ANESTHESIA: IV sedation.     ESTIMATED BLOOD LOSS: None.     COMPLICATIONS: None.     INDICATIONS: Lydia Orellana is a 64 y.o. year-old female with a history of symptomatic obstructive sleep apnea, who is intolerant and unable to achieve benefit with positive pressure therapy. She presents today for drug-induced sleep endoscopy to better characterize her locations and pattern of obstruction and to predict appropriate medical and/or surgical options moving forward.     FINDINGS: There was no evidence of complete concentric palatal obstruction and she does appear to be a candidate anatomically for hypoglossal nerve   stimulation therapy.     DESCRIPTION OF PROCEDURE: Lydia Orellana was brought to the operating room and was anesthetized via the standard drug-induced sleep endoscopy protocol. The propofol infusion rate was startedand gradually increased until conditions that mimic sleep were gradually observed.      With the patient not responsive to verbal commands, but still with spontaneous respiration, sleep disordered breathing events and associated desaturations were clearly observed    Under these conditions, the flexible endoscope was inserted to examine both sides of the nose as well as the pharynx and larynx.     The VOTE score at baseline was V: 2 complete AP; O: 1 partial AP; T: 2 complete AP; E: 2 complete AP. With simulated jaw advancement and tongue advancement, the hypopharyngeal obstruction and secondarily the palatal collapse also improved.     In summary, there was no evidence of complete concentric palatal obstruction and she does appear to be a candidate anatomically for hypoglossal nerve stimulation therapy.     The patient was then allowed to awaken while monitoring by anesthesia was performed until he was awake and able to maintain his own saturations. The patient was taken to the recovery area in stable condition. All instruments and sponge counts were correct at the end of the procedure.     I, Otis Malone MD, was present for and performed all key elements of the procedure.       I was present for the entire procedure. and A qualified resident physician was not available.    Patient Disposition:  PACU  and hemodynamically stable             SIGNATURE: Otis Malone MD  DATE: September 23, 2024  TIME: 9:38 AM

## 2024-10-23 ENCOUNTER — OFFICE VISIT (OUTPATIENT)
Dept: DERMATOLOGY | Facility: CLINIC | Age: 64
End: 2024-10-23
Payer: COMMERCIAL

## 2024-10-23 ENCOUNTER — COSMETIC (OUTPATIENT)
Dept: DERMATOLOGY | Facility: CLINIC | Age: 64
End: 2024-10-23

## 2024-10-23 VITALS — BODY MASS INDEX: 27.37 KG/M2 | TEMPERATURE: 98.1 F | HEIGHT: 60 IN | WEIGHT: 139.4 LBS

## 2024-10-23 DIAGNOSIS — D22.61 MULTIPLE BENIGN MELANOCYTIC NEVI OF UPPER AND LOWER EXTREMITIES AND TRUNK: ICD-10-CM

## 2024-10-23 DIAGNOSIS — L82.1 SK (SEBORRHEIC KERATOSIS): ICD-10-CM

## 2024-10-23 DIAGNOSIS — L85.3 XEROSIS OF SKIN: ICD-10-CM

## 2024-10-23 DIAGNOSIS — Z41.1 ENCOUNTER FOR COSMETIC PROCEDURE: Primary | ICD-10-CM

## 2024-10-23 DIAGNOSIS — D22.5 MULTIPLE BENIGN MELANOCYTIC NEVI OF UPPER AND LOWER EXTREMITIES AND TRUNK: ICD-10-CM

## 2024-10-23 DIAGNOSIS — Z12.83 SKIN CANCER SCREENING: Primary | ICD-10-CM

## 2024-10-23 DIAGNOSIS — D22.72 MULTIPLE BENIGN MELANOCYTIC NEVI OF UPPER AND LOWER EXTREMITIES AND TRUNK: ICD-10-CM

## 2024-10-23 DIAGNOSIS — D22.62 MULTIPLE BENIGN MELANOCYTIC NEVI OF UPPER AND LOWER EXTREMITIES AND TRUNK: ICD-10-CM

## 2024-10-23 DIAGNOSIS — D22.71 MULTIPLE BENIGN MELANOCYTIC NEVI OF UPPER AND LOWER EXTREMITIES AND TRUNK: ICD-10-CM

## 2024-10-23 DIAGNOSIS — L81.4 LENTIGINES: ICD-10-CM

## 2024-10-23 DIAGNOSIS — D18.01 CHERRY ANGIOMA: ICD-10-CM

## 2024-10-23 PROCEDURE — SKNTGDERM SKIN TAG DERM ADD ON: Performed by: DERMATOLOGY

## 2024-10-23 PROCEDURE — 99203 OFFICE O/P NEW LOW 30 MIN: CPT | Performed by: DERMATOLOGY

## 2024-10-23 NOTE — PROGRESS NOTES
"[facility] Note     Patient Name: Lydia Orellana  Encounter Date: 10/23/2024     Have you been cared for by a [facility] in the last 3 years and, if so, which description applies to you?    Yes.  I have been here within the last 3 years, and my medical history has NOT changed since that time.  I am FEMALE/of child-bearing potential.    REVIEW OF SYSTEMS:  Have you recently had or currently have any of the following? No changes in my recent health.   PAST MEDICAL HISTORY:  Have you personally ever had or currently have any of the following?  If \"YES,\" then please provide more detail. No changes in my medical history.   HISTORY OF IMMUNOSUPPRESSION: Do you have a history of any of the following:  Systemic Immunosuppression such as Diabetes, Biologic or Immunotherapy, Chemotherapy, Organ Transplantation, Bone Marrow Transplantation or Prednisone?  No     Answering \"YES\" requires the addition of the dotphrase \"IMMUNOSUPPRESSED\" as the first diagnosis of the patient's visit.   FAMILY HISTORY:  Any \"first degree relatives\" (parent, brother, sister, or child) with the following?    No changes in my family's known health.   PATIENT EXPERIENCE:    Do you want the Dermatologist to perform a COMPLETE skin exam today including a clinical examination under the \"bra and underwear\" areas?  NO  If necessary, do we have your permission to call and leave a detailed message on your Preferred Phone number that includes your specific medical information?  Yes      No Known Allergies   Current Outpatient Medications:     acetaminophen (TYLENOL) 650 mg CR tablet, Take 1 tablet (650 mg total) by mouth every 8 (eight) hours as needed for mild pain (Patient not taking: Reported on 10/23/2024), Disp: 30 tablet, Rfl: 0    alendronate (FOSAMAX) 70 mg tablet, Take 1 tablet (70 mg total) by mouth every 7 days Take once every week on empty stomach with water and stay upright for 1/2 hour after taking the medication, " Disp: 12 tablet, Rfl: 3    amphetamine-dextroamphetamine (ADDERALL) 20 mg tablet, Take 1 tablet (20 mg total) by mouth daily Max Daily Amount: 20 mg, Disp: 90 tablet, Rfl: 0    calcium carbonate (OS-IVONE) 600 MG tablet, Take 600 mg by mouth 2 (two) times a day with meals, Disp: , Rfl:     Cholecalciferol (VITAMIN D) 2000 units CAPS, Take by mouth Take 5000IU daily, Disp: , Rfl:     Multiple Vitamin (MULTI-VITAMIN DAILY) TABS, Take by mouth, Disp: , Rfl:     omega-3-acid ethyl esters (LOVAZA) 1 g capsule, Take 2 g by mouth 2 (two) times a day, Disp: , Rfl:     sertraline (ZOLOFT) 100 mg tablet, Take 1 tablet (100 mg total) by mouth daily at bedtime, Disp: 90 tablet, Rfl: 2          Whom besides the patient is providing clinical information about today's encounter?   NO ADDITIONAL HISTORIAN (patient alone provided history)    Physical Exam and Assessment/Plan by Diagnosis:      COSMETIC CONSULT, SEBORRHEIC KERATOSES    Physical Exam:  Anatomic Location Affected & Morphological Description:  stuck on tan to skin colored papules under the right eye      Assessment and Plan:  Based on a thorough discussion of this condition and the management approach to it (including a comprehensive discussion of the known risks, side effects and potential benefits of treatment), the patient (family) agrees to implement the following specific plan:    Discussed destruction with cryotherapy.   Discussed cosmetic charge: $20 charge for destruction of 1 lesion  Pt agreeable to do procedure today. Consent signed, see procedure note below       PROCEDURE  NOTE     Destruction of Seborrheic Keratosis with Cryotherapy   After a thorough discussion of treatment options and risk/benefits/alternatives (including but not limited to local pain, scarring, dyspigmentation, blistering, and possible superinfection), verbal and written consent were obtained and the aforementioned lesions were treated on with cryotherapy using liquid nitrogen x 1 cycle  for 5-10 seconds.     TOTAL NUMBER of 1 lesions were treated today on the under right eye      The patient tolerated the procedure well, and after-care instructions were provided.     This was cosmetic visit that patient paid out of pocket for: $20 charge      DO NOT BILL INSURANCE    Scribe Attestation      I,:  Amy Gant MA am acting as a scribe while in the presence of the attending physician.:       I,:  Danii Goldsmith MD personally performed the services described in this documentation    as scribed in my presence.:               This was cosmetic visit that patient paid out of pocket for.    $20 charge for up to 1 lesion  Total cost: $20.00     DO NOT BILL INSURANCE

## 2024-10-23 NOTE — PROGRESS NOTES
"[facility] Note     Patient Name: Lydia Orellana  Encounter Date: 10/23/2024     Have you been cared for by a [facility] Dermatologist in the last 3 years and, if so, which description applies to you?    NO.   I am considered a \"new\" patient and must complete all patient intake questions. I am FEMALE/of child-bearing potential.    REVIEW OF SYSTEMS:  Have you recently had or currently have any of the following? Recent fever or chills? No  Any non-healing wound? No  Are you pregnant or planning to become pregnant? No  Are you currently or planning to be nursing or breast feeding? No   PAST MEDICAL HISTORY:  Have you personally ever had or currently have any of the following?  If \"YES,\" then please provide more detail. Skin cancer (such as Melanoma, Basal Cell Carcinoma, Squamous Cell Carcinoma?  No  Tuberculosis, HIV/AIDS, Hepatitis B or C: No  Radiation Treatment YES, for breast cancer in 2012 or 2013   HISTORY OF IMMUNOSUPPRESSION:   Do you have a history of any of the following:  Systemic Immunosuppression such as Diabetes, Biologic or Immunotherapy, Chemotherapy, Organ Transplantation, Bone Marrow Transplantation or Prednsione?  No    Answering \"YES\" requires the addition of the dotphrase \"IMMUNOSUPPRESSED\" as the first diagnosis of the patient's visit.   FAMILY HISTORY:  Any \"first degree relatives\" (parent, brother, sister, or child) with the following?    Skin Cancer, Pancreatic or Other Cancer? YES, see family history. Nephew had melanoma when he was 13   PATIENT EXPERIENCE:    Do you want the Dermatologist to perform a COMPLETE skin exam today including a clinical examination under the \"bra and underwear\" areas?  Yes-pt denied check in underwear   If necessary, do we have your permission to call and leave a detailed message on your Preferred Phone number that includes your specific medical information?  Yes      No Known Allergies   Current Outpatient Medications:     acetaminophen (TYLENOL) " "650 mg CR tablet, Take 1 tablet (650 mg total) by mouth every 8 (eight) hours as needed for mild pain, Disp: 30 tablet, Rfl: 0    alendronate (FOSAMAX) 70 mg tablet, Take 1 tablet (70 mg total) by mouth every 7 days Take once every week on empty stomach with water and stay upright for 1/2 hour after taking the medication, Disp: 12 tablet, Rfl: 3    amphetamine-dextroamphetamine (ADDERALL) 20 mg tablet, Take 1 tablet (20 mg total) by mouth daily Max Daily Amount: 20 mg, Disp: 90 tablet, Rfl: 0    calcium carbonate (OS-IVONE) 600 MG tablet, Take 600 mg by mouth 2 (two) times a day with meals, Disp: , Rfl:     Cholecalciferol (VITAMIN D) 2000 units CAPS, Take by mouth Take 5000IU daily, Disp: , Rfl:     Multiple Vitamin (MULTI-VITAMIN DAILY) TABS, Take by mouth, Disp: , Rfl:     omega-3-acid ethyl esters (LOVAZA) 1 g capsule, Take 2 g by mouth 2 (two) times a day, Disp: , Rfl:     sertraline (ZOLOFT) 100 mg tablet, Take 1 tablet (100 mg total) by mouth daily at bedtime, Disp: 90 tablet, Rfl: 2          Whom besides the patient is providing clinical information about today's encounter?   NO ADDITIONAL HISTORIAN (patient alone provided history)    Physical Exam and Assessment/Plan by Diagnosis:    New patient here for FBSE. Has SOC under right eye, present for about one year and non bothersome. No hx of skin cancer.    MELANOCYTIC NEVI (\"Moles\")    Physical Exam:  Anatomic Location Affected:   Mostly on sun-exposed areas of the trunk and extremities  Morphological Description:  Scattered, 1-4mm round to ovoid, symmetrical-appearing, even bordered, skin colored to dark brown macules/papules, mostly in sun-exposed areas  Pertinent Positives:  Pertinent Negatives:    Additional History of Present Condition:      Assessment and Plan:  Based on a thorough discussion of this condition and the management approach to it (including a comprehensive discussion of the known risks, side effects and potential benefits of treatment), the " "patient (family) agrees to implement the following specific plan:  When outside we recommend using a wide brim hat, sunglasses, long sleeve and pants, sunscreen with SPF 30+ with reapplication every 2 hours, or SPF specific clothing   Benign, reassured  Annual skin check     Melanocytic Nevi  Melanocytic nevi (\"moles\") are tan or brown, raised or flat areas of the skin which have an increased number of melanocytes. Melanocytes are the cells in our body which make pigment and account for skin color.    Some moles are present at birth (I.e., \"congenital nevi\"), while others come up later in life (i.e., \"acquired nevi\").  The sun can stimulate the body to make more moles.  Sunburns are not the only thing that triggers more moles.  Chronic sun exposure can do it too.     Clinically distinguishing a healthy mole from melanoma may be difficult, even for experienced dermatologists. The \"ABCDE's\" of moles have been suggested as a means of helping to alert a person to a suspicious mole and the possible increased risk of melanoma.  The suggestions for raising alert are as follows:    Asymmetry: Healthy moles tend to be symmetric, while melanomas are often asymmetric.  Asymmetry means if you draw a line through the mole, the two halves do not match in color, size, shape, or surface texture. Asymmetry can be a result of rapid enlargement of a mole, the development of a raised area on a previously flat lesion, scaling, ulceration, bleeding or scabbing within the mole.  Any mole that starts to demonstrate \"asymmetry\" should be examined promptly by a board certified dermatologist.     Border: Healthy moles tend to have discrete, even borders.  The border of a melanoma often blends into the normal skin and does not sharply delineate the mole from normal skin.  Any mole that starts to demonstrate \"uneven borders\" should be examined promptly by a board certified dermatologist.     Color: Healthy moles tend to be one color throughout. " " Melanomas tend to be made up of different colors ranging from dark black, blue, white, or red.  Any mole that demonstrates a color change should be examined promptly by a board certified dermatologist.     Diameter: Healthy moles tend to be smaller than 0.6 cm in size; an exception are \"congenital nevi\" that can be larger.  Melanomas tend to grow and can often be greater than 0.6 cm (1/4 of an inch, or the size of a pencil eraser). This is only a guideline, and many normal moles may be larger than 0.6 cm without being unhealthy.  Any mole that starts to change in size (small to bigger or bigger to smaller) should be examined promptly by a board certified dermatologist.     Evolving: Healthy moles tend to \"stay the same.\"  Melanomas may often show signs of change or evolution such as a change in size, shape, color, or elevation.  Any mole that starts to itch, bleed, crust, burn, hurt, or ulcerate or demonstrate a change or evolution should be examined promptly by a board certified dermatologist.        LENTIGO    Physical Exam:  Anatomic Location Affected:  trunk, arms  Morphological Description:  Light brown macules  Pertinent Positives:  Pertinent Negatives:    Additional History of Present Condition:      Assessment and Plan:  Based on a thorough discussion of this condition and the management approach to it (including a comprehensive discussion of the known risks, side effects and potential benefits of treatment), the patient (family) agrees to implement the following specific plan:  When outside we recommend using a wide brim hat, sunglasses, long sleeve and pants, sunscreen with SPF 30+ with reapplication every 2 hours, or SPF specific clothing       What is a lentigo?  A lentigo is a pigmented flat or slightly raised lesion with a clearly defined edge. Unlike an ephelis (freckle), it does not fade in the winter months. There are several kinds of lentigo.  The name lentigo originally referred to its appearance " resembling a small lentil. The plural of lentigo is lentigines, although “lentigos” is also in common use.    Who gets lentigines?  Lentigines can affect males and females of all ages and races. Solar lentigines are especially prevalent in fair skinned adults. Lentigines associated with syndromes are present at birth or arise during childhood.    What causes lentigines?  Common forms of lentigo are due to exposure to ultraviolet radiation:  Sun damage including sunburn   Indoor tanning   Phototherapy, especially photochemotherapy (PUVA)    Ionizing radiation, eg radiation therapy, can also cause lentigines.  Several familial syndromes associated with widespread lentigines originate from mutations in Maximiliano-MAP kinase, mTOR signaling and PTEN pathways.    What is the treatment for lentigines?  Most lentigines are left alone. Attempts to lighten them may not be successful. The following approaches are used:  SPF 50+ broad-spectrum sunscreen   Hydroquinone bleaching cream   Alpha hydroxy acids   Vitamin C   Retinoids   Azelaic acid   Chemical peels  Individual lesions can be permanently removed using:  Cryotherapy   Intense pulsed light   Pigment lasers    How can lentigines be prevented?  Lentigines associated with exposure ultraviolet radiation can be prevented by very careful sun protection. Clothing is more successful at preventing new lentigines than are sunscreens.    What is the outlook for lentigines?  Lentigines usually persist. They may increase in number with age and sun exposure. Some in sun-protected sites may fade and disappear.    MACKENZIE ANGIOMAS    Physical Exam:  Anatomic Location Affected:  trunk  Morphological Description:  Scattered cherry red, 1-4 mm papules.  Pertinent Positives:  Pertinent Negatives:    Additional History of Present Condition:      Assessment and Plan:  Based on a thorough discussion of this condition and the management approach to it (including a comprehensive discussion of the  "known risks, side effects and potential benefits of treatment), the patient (family) agrees to implement the following specific plan:  Monitor for changes  Benign, reassured      Assessment and Plan:    Cherry angioma, also known as Welch de Last spots, are benign vascular skin lesions. A \"cherry angioma\" is a firm red, blue or purple papule, 0.1-1 cm in diameter. When thrombosed, they can appear black in colour until evaluated with a dermatoscope when the red or purple colour is more easily seen. Cherry angioma may develop on any part of the body but most often appear on the scalp, face, lips and trunk.  An angioma is due to proliferating endothelial cells; these are the cells that line the inside of a blood vessel.    Angiomas can arise in early life or later in life; the most common type of angioma is a cherry angioma.  Cherry angiomas are very common in males and females of any age or race. They are more noticeable in white skin than in skin of colour. They markedly increase in number from about the age of 40. There may be a family history of similar lesions. Eruptive cherry angiomas have been rarely reported to be associated with internal malignancy. The cause of angiomas is unknown. Genetic analysis of cherry angiomas has shown that they frequently carry specific somatic missense mutations in the GNAQ and GNA11 (Q209H) genes, which are involved in other vascular and melanocytic proliferations.      SEBORRHEIC KERATOSIS; NON-INFLAMED    Physical Exam:  Anatomic Location Affected:  trunk, under right eye  Morphological Description:  Flat and raised, waxy, smooth to warty textured, yellow to brownish-grey to dark brown to blackish, discrete, \"stuck-on\" appearing papules.  Pertinent Positives:  Pertinent Negatives:    Additional History of Present Condition:      Assessment and Plan:  Based on a thorough discussion of this condition and the management approach to it (including a comprehensive discussion of " "the known risks, side effects and potential benefits of treatment), the patient (family) agrees to implement the following specific plan:  Monitor for changes  Benign, reassured  Can be removed cosmetically  Separate cosmetic appointment made today      Seborrheic Keratosis  A seborrheic keratosis is a harmless warty spot that appears during adult life as a common sign of skin aging.  Seborrheic keratoses can arise on any area of skin, covered or uncovered, with the usual exception of the palms and soles. They do not arise from mucous membranes. Seborrheic keratoses can have highly variable appearance.      Seborrheic keratoses are extremely common. It has been estimated that over 90% of adults over the age of 60 years have one or more of them. They occur in males and females of all races, typically beginning to erupt in the 30s or 40s. They are uncommon under the age of 20 years.  The precise cause of seborrhoeic keratoses is not known.  Seborrhoeic keratoses are considered degenerative in nature. As time goes by, seborrheic keratoses tend to become more numerous. Some people inherit a tendency to develop a very large number of them; some people may have hundreds of them.      There is no easy way to remove multiple lesions on a single occasion.  Unless a specific lesion is \"inflamed\" and is causing pain or stinging/burning or is bleeding, most insurance companies do not authorize treatment.    XEROSIS (\"DRY SKIN\")    Physical Exam:  Anatomic Location Affected:  diffuse  Morphological Description:  xerosis  Pertinent Positives:  Pertinent Negatives:    Additional History of Present Condition:      Assessment and Plan:  Based on a thorough discussion of this condition and the management approach to it (including a comprehensive discussion of the known risks, side effects and potential benefits of treatment), the patient (family) agrees to implement the following specific plan:  Use moisturizer like Eucerin,Cerave or " Aveeno Cream 3 times a day for the dry skin            Dry skin refers to skin that feels dry to touch. Dry skin has a dull surface with a rough, scaly quality. The skin is less pliable and cracked. When dryness is severe, the skin may become inflamed and fissured.  Although any body site can be dry, dry skin tends to affect the shins more than any other site.    Dry skin is lacking moisture in the outer horny cell layer (stratum corneum) and this results in cracks in the skin surface.  Dry skin is also called xerosis, xeroderma or asteatosis (lack of fat).  It can affect males and females of all ages. There is some racial variability in water and lipid content of the skin.  Dry skin that starts in early childhood may be one of about 20 types of ichthyosis (fish-scale skin). There is often a family history of dry skin.   Dry skin is commonly seen in people with atopic dermatitis.  Nearly everyone > 60 years has dry skin.    Dry skin that begins later may be seen in people with certain diseases and conditions.  Postmenopausal women  Hypothyroidism  Chronic renal disease   Malnutrition and weight loss   Subclinical dermatitis   Treatment with certain drugs such as oral retinoids, diuretics and epidermal growth factor receptor inhibitors      What is the treatment for dry skin?  The mainstay of treatment of dry skin and ichthyosis is moisturisers/emollients. They should be applied liberally and often enough to:  Reduce itch   Improve the barrier function   Prevent entry of irritants, bacteria   Reduce transepidermal water loss.      How can dry skin be prevented?  Eliminate aggravating factors:  Reduce the frequency of bathing.   A humidifier in winter and air conditioner in summer   Compare having a short shower with a prolonged soak in a bath.   Use lukewarm, not hot, water.   Replace standard soap with a substitute such as a synthetic detergent cleanser, water-miscible emollient, bath oil, anti-pruritic tar oil,  colloidal oatmeal etc.   Apply an emollient liberally and often, particularly shortly after bathing, and when itchy. The drier the skin, the thicker this should be, especially on the hands.    What is the outlook for dry skin?  A tendency to dry skin may persist life-long, or it may improve once contributing factors are controlled.     Scribe Attestation      I,:  Amy Gant MA am acting as a scribe while in the presence of the attending physician.:       I,:  Danii Goldsmith MD personally performed the services described in this documentation    as scribed in my presence.:

## 2024-10-23 NOTE — PATIENT INSTRUCTIONS
"MELANOCYTIC NEVI (\"Moles\")    Assessment and Plan:  Based on a thorough discussion of this condition and the management approach to it (including a comprehensive discussion of the known risks, side effects and potential benefits of treatment), the patient (family) agrees to implement the following specific plan:  When outside we recommend using a wide brim hat, sunglasses, long sleeve and pants, sunscreen with SPF 30+ with reapplication every 2 hours, or SPF specific clothing   Benign, reassured  Annual skin check     Melanocytic Nevi  Melanocytic nevi (\"moles\") are tan or brown, raised or flat areas of the skin which have an increased number of melanocytes. Melanocytes are the cells in our body which make pigment and account for skin color.    Some moles are present at birth (I.e., \"congenital nevi\"), while others come up later in life (i.e., \"acquired nevi\").  The sun can stimulate the body to make more moles.  Sunburns are not the only thing that triggers more moles.  Chronic sun exposure can do it too.     Clinically distinguishing a healthy mole from melanoma may be difficult, even for experienced dermatologists. The \"ABCDE's\" of moles have been suggested as a means of helping to alert a person to a suspicious mole and the possible increased risk of melanoma.  The suggestions for raising alert are as follows:    Asymmetry: Healthy moles tend to be symmetric, while melanomas are often asymmetric.  Asymmetry means if you draw a line through the mole, the two halves do not match in color, size, shape, or surface texture. Asymmetry can be a result of rapid enlargement of a mole, the development of a raised area on a previously flat lesion, scaling, ulceration, bleeding or scabbing within the mole.  Any mole that starts to demonstrate \"asymmetry\" should be examined promptly by a board certified dermatologist.     Border: Healthy moles tend to have discrete, even borders.  The border of a melanoma often blends into " "the normal skin and does not sharply delineate the mole from normal skin.  Any mole that starts to demonstrate \"uneven borders\" should be examined promptly by a board certified dermatologist.     Color: Healthy moles tend to be one color throughout.  Melanomas tend to be made up of different colors ranging from dark black, blue, white, or red.  Any mole that demonstrates a color change should be examined promptly by a board certified dermatologist.     Diameter: Healthy moles tend to be smaller than 0.6 cm in size; an exception are \"congenital nevi\" that can be larger.  Melanomas tend to grow and can often be greater than 0.6 cm (1/4 of an inch, or the size of a pencil eraser). This is only a guideline, and many normal moles may be larger than 0.6 cm without being unhealthy.  Any mole that starts to change in size (small to bigger or bigger to smaller) should be examined promptly by a board certified dermatologist.     Evolving: Healthy moles tend to \"stay the same.\"  Melanomas may often show signs of change or evolution such as a change in size, shape, color, or elevation.  Any mole that starts to itch, bleed, crust, burn, hurt, or ulcerate or demonstrate a change or evolution should be examined promptly by a board certified dermatologist.        LENTIGO    Assessment and Plan:  Based on a thorough discussion of this condition and the management approach to it (including a comprehensive discussion of the known risks, side effects and potential benefits of treatment), the patient (family) agrees to implement the following specific plan:  When outside we recommend using a wide brim hat, sunglasses, long sleeve and pants, sunscreen with SPF 30+ with reapplication every 2 hours, or SPF specific clothing       What is a lentigo?  A lentigo is a pigmented flat or slightly raised lesion with a clearly defined edge. Unlike an ephelis (freckle), it does not fade in the winter months. There are several kinds of lentigo.  The " name lentigo originally referred to its appearance resembling a small lentil. The plural of lentigo is lentigines, although “lentigos” is also in common use.    Who gets lentigines?  Lentigines can affect males and females of all ages and races. Solar lentigines are especially prevalent in fair skinned adults. Lentigines associated with syndromes are present at birth or arise during childhood.    What causes lentigines?  Common forms of lentigo are due to exposure to ultraviolet radiation:  Sun damage including sunburn   Indoor tanning   Phototherapy, especially photochemotherapy (PUVA)    Ionizing radiation, eg radiation therapy, can also cause lentigines.  Several familial syndromes associated with widespread lentigines originate from mutations in Maximiliano-MAP kinase, mTOR signaling and PTEN pathways.    What is the treatment for lentigines?  Most lentigines are left alone. Attempts to lighten them may not be successful. The following approaches are used:  SPF 50+ broad-spectrum sunscreen   Hydroquinone bleaching cream   Alpha hydroxy acids   Vitamin C   Retinoids   Azelaic acid   Chemical peels  Individual lesions can be permanently removed using:  Cryotherapy   Intense pulsed light   Pigment lasers    How can lentigines be prevented?  Lentigines associated with exposure ultraviolet radiation can be prevented by very careful sun protection. Clothing is more successful at preventing new lentigines than are sunscreens.    What is the outlook for lentigines?  Lentigines usually persist. They may increase in number with age and sun exposure. Some in sun-protected sites may fade and disappear.    MACKENZIE ANGIOMAS    Assessment and Plan:  Based on a thorough discussion of this condition and the management approach to it (including a comprehensive discussion of the known risks, side effects and potential benefits of treatment), the patient (family) agrees to implement the following specific plan:  Monitor for changes  Benign,  "reassured      Assessment and Plan:    Cherry angioma, also known as Welch de Last spots, are benign vascular skin lesions. A \"cherry angioma\" is a firm red, blue or purple papule, 0.1-1 cm in diameter. When thrombosed, they can appear black in colour until evaluated with a dermatoscope when the red or purple colour is more easily seen. Cherry angioma may develop on any part of the body but most often appear on the scalp, face, lips and trunk.  An angioma is due to proliferating endothelial cells; these are the cells that line the inside of a blood vessel.    Angiomas can arise in early life or later in life; the most common type of angioma is a cherry angioma.  Cherry angiomas are very common in males and females of any age or race. They are more noticeable in white skin than in skin of colour. They markedly increase in number from about the age of 40. There may be a family history of similar lesions. Eruptive cherry angiomas have been rarely reported to be associated with internal malignancy. The cause of angiomas is unknown. Genetic analysis of cherry angiomas has shown that they frequently carry specific somatic missense mutations in the GNAQ and GNA11 (Q209H) genes, which are involved in other vascular and melanocytic proliferations.      SEBORRHEIC KERATOSIS; NON-INFLAMED    Assessment and Plan:  Based on a thorough discussion of this condition and the management approach to it (including a comprehensive discussion of the known risks, side effects and potential benefits of treatment), the patient (family) agrees to implement the following specific plan:  Monitor for changes  Benign, reassured      Seborrheic Keratosis  A seborrheic keratosis is a harmless warty spot that appears during adult life as a common sign of skin aging.  Seborrheic keratoses can arise on any area of skin, covered or uncovered, with the usual exception of the palms and soles. They do not arise from mucous membranes. Seborrheic " "keratoses can have highly variable appearance.      Seborrheic keratoses are extremely common. It has been estimated that over 90% of adults over the age of 60 years have one or more of them. They occur in males and females of all races, typically beginning to erupt in the 30s or 40s. They are uncommon under the age of 20 years.  The precise cause of seborrhoeic keratoses is not known.  Seborrhoeic keratoses are considered degenerative in nature. As time goes by, seborrheic keratoses tend to become more numerous. Some people inherit a tendency to develop a very large number of them; some people may have hundreds of them.      There is no easy way to remove multiple lesions on a single occasion.  Unless a specific lesion is \"inflamed\" and is causing pain or stinging/burning or is bleeding, most insurance companies do not authorize treatment.    XEROSIS (\"DRY SKIN\")    Assessment and Plan:  Based on a thorough discussion of this condition and the management approach to it (including a comprehensive discussion of the known risks, side effects and potential benefits of treatment), the patient (family) agrees to implement the following specific plan:  Use moisturizer like Eucerin,Cerave or Aveeno Cream 3 times a day for the dry skin            Dry skin refers to skin that feels dry to touch. Dry skin has a dull surface with a rough, scaly quality. The skin is less pliable and cracked. When dryness is severe, the skin may become inflamed and fissured.  Although any body site can be dry, dry skin tends to affect the shins more than any other site.    Dry skin is lacking moisture in the outer horny cell layer (stratum corneum) and this results in cracks in the skin surface.  Dry skin is also called xerosis, xeroderma or asteatosis (lack of fat).  It can affect males and females of all ages. There is some racial variability in water and lipid content of the skin.  Dry skin that starts in early childhood may be one of about " 20 types of ichthyosis (fish-scale skin). There is often a family history of dry skin.   Dry skin is commonly seen in people with atopic dermatitis.  Nearly everyone > 60 years has dry skin.    Dry skin that begins later may be seen in people with certain diseases and conditions.  Postmenopausal women  Hypothyroidism  Chronic renal disease   Malnutrition and weight loss   Subclinical dermatitis   Treatment with certain drugs such as oral retinoids, diuretics and epidermal growth factor receptor inhibitors      What is the treatment for dry skin?  The mainstay of treatment of dry skin and ichthyosis is moisturisers/emollients. They should be applied liberally and often enough to:  Reduce itch   Improve the barrier function   Prevent entry of irritants, bacteria   Reduce transepidermal water loss.      How can dry skin be prevented?  Eliminate aggravating factors:  Reduce the frequency of bathing.   A humidifier in winter and air conditioner in summer   Compare having a short shower with a prolonged soak in a bath.   Use lukewarm, not hot, water.   Replace standard soap with a substitute such as a synthetic detergent cleanser, water-miscible emollient, bath oil, anti-pruritic tar oil, colloidal oatmeal etc.   Apply an emollient liberally and often, particularly shortly after bathing, and when itchy. The drier the skin, the thicker this should be, especially on the hands.    What is the outlook for dry skin?  A tendency to dry skin may persist life-long, or it may improve once contributing factors are controlled.

## 2024-11-27 ENCOUNTER — TELEPHONE (OUTPATIENT)
Age: 64
End: 2024-11-27

## 2024-11-27 NOTE — TELEPHONE ENCOUNTER
Pt called in requesting a virtual appt due to appt availability. She won't be seen until February and would like to see Dr. Bueno sooner. Pt is on a cancellation list. Pt also mentioned having labs put into her chart since there are currently none. Please call pt back and inform of any availability for a virtual.

## 2024-12-05 ENCOUNTER — PATIENT MESSAGE (OUTPATIENT)
Dept: ENDOCRINOLOGY | Facility: CLINIC | Age: 64
End: 2024-12-05

## 2024-12-05 DIAGNOSIS — M81.8 OTHER OSTEOPOROSIS WITHOUT CURRENT PATHOLOGICAL FRACTURE: Primary | ICD-10-CM

## 2024-12-05 NOTE — PATIENT COMMUNICATION
Spoke with pt and she is requesting refill of Fosamax sent to [facility].  Stated she did not know why pcp refilled last time but Dr. Bueno has filled it prior.

## 2024-12-06 DIAGNOSIS — M81.8 OTHER OSTEOPOROSIS WITHOUT CURRENT PATHOLOGICAL FRACTURE: ICD-10-CM

## 2024-12-06 RX ORDER — ALENDRONATE SODIUM 70 MG/1
70 TABLET ORAL
Qty: 12 TABLET | Refills: 0 | Status: SHIPPED | OUTPATIENT
Start: 2024-12-06 | End: 2024-12-08 | Stop reason: SDUPTHER

## 2024-12-08 DIAGNOSIS — M81.8 OTHER OSTEOPOROSIS WITHOUT CURRENT PATHOLOGICAL FRACTURE: ICD-10-CM

## 2024-12-10 RX ORDER — ALENDRONATE SODIUM 70 MG/1
70 TABLET ORAL
Qty: 12 TABLET | Refills: 0 | Status: SHIPPED | OUTPATIENT
Start: 2024-12-10

## 2024-12-13 ENCOUNTER — TELEPHONE (OUTPATIENT)
Age: 64
End: 2024-12-13

## 2024-12-13 NOTE — TELEPHONE ENCOUNTER
The pt has an upcoming appt [id number] and is asking for her labs slips to be entered. She was last seen 7/2023. She will look on [employer] when in and is going to a [facility] facility to get them done.

## 2024-12-17 ENCOUNTER — APPOINTMENT (OUTPATIENT)
Dept: LAB | Age: 64
End: 2024-12-17
Payer: COMMERCIAL

## 2024-12-17 DIAGNOSIS — M81.8 OTHER OSTEOPOROSIS WITHOUT CURRENT PATHOLOGICAL FRACTURE: ICD-10-CM

## 2024-12-17 LAB
25(OH)D3 SERPL-MCNC: 95.5 NG/ML (ref 30–100)
ALBUMIN SERPL BCG-MCNC: 4.1 G/DL (ref 3.5–5)
ALP SERPL-CCNC: 38 U/L (ref 34–104)
ALT SERPL W P-5'-P-CCNC: 21 U/L (ref 7–52)
ANION GAP SERPL CALCULATED.3IONS-SCNC: 4 MMOL/L (ref 4–13)
AST SERPL W P-5'-P-CCNC: 24 U/L (ref 13–39)
BILIRUB SERPL-MCNC: 0.51 MG/DL (ref 0.2–1)
BUN SERPL-MCNC: 18 MG/DL (ref 5–25)
CALCIUM SERPL-MCNC: 8.9 MG/DL (ref 8.4–10.2)
CHLORIDE SERPL-SCNC: 103 MMOL/L (ref 96–108)
CO2 SERPL-SCNC: 31 MMOL/L (ref 21–32)
CREAT SERPL-MCNC: 0.64 MG/DL (ref 0.6–1.3)
GFR SERPL CREATININE-BSD FRML MDRD: 93 ML/MIN/1.73SQ M
GLUCOSE SERPL-MCNC: 92 MG/DL (ref 65–140)
PHOSPHATE SERPL-MCNC: 2.7 MG/DL (ref 2.3–4.1)
POTASSIUM SERPL-SCNC: 3.7 MMOL/L (ref 3.5–5.3)
PROT SERPL-MCNC: 6.1 G/DL (ref 6.4–8.4)
PTH-INTACT SERPL-MCNC: 56.6 PG/ML (ref 12–88)
SODIUM SERPL-SCNC: 138 MMOL/L (ref 135–147)

## 2024-12-17 PROCEDURE — 80053 COMPREHEN METABOLIC PANEL: CPT

## 2024-12-17 PROCEDURE — 84100 ASSAY OF PHOSPHORUS: CPT

## 2024-12-17 PROCEDURE — 36415 COLL VENOUS BLD VENIPUNCTURE: CPT

## 2024-12-17 PROCEDURE — 83970 ASSAY OF PARATHORMONE: CPT

## 2024-12-17 PROCEDURE — 82306 VITAMIN D 25 HYDROXY: CPT

## 2024-12-18 ENCOUNTER — OFFICE VISIT (OUTPATIENT)
Dept: FAMILY MEDICINE CLINIC | Facility: CLINIC | Age: 64
End: 2024-12-18
Payer: COMMERCIAL

## 2024-12-18 VITALS
OXYGEN SATURATION: 96 % | SYSTOLIC BLOOD PRESSURE: 118 MMHG | WEIGHT: 141 LBS | BODY MASS INDEX: 27.68 KG/M2 | DIASTOLIC BLOOD PRESSURE: 60 MMHG | HEART RATE: 67 BPM | HEIGHT: 60 IN

## 2024-12-18 DIAGNOSIS — E78.00 HYPERCHOLESTEROLEMIA: ICD-10-CM

## 2024-12-18 DIAGNOSIS — F90.9 ATTENTION DEFICIT HYPERACTIVITY DISORDER (ADHD), UNSPECIFIED ADHD TYPE: ICD-10-CM

## 2024-12-18 DIAGNOSIS — G47.33 OSA ON CPAP: Primary | ICD-10-CM

## 2024-12-18 DIAGNOSIS — E66.3 OVERWEIGHT WITH BODY MASS INDEX (BMI) OF 27 TO 27.9 IN ADULT: ICD-10-CM

## 2024-12-18 DIAGNOSIS — F41.8 MIXED ANXIETY AND DEPRESSIVE DISORDER: ICD-10-CM

## 2024-12-18 DIAGNOSIS — E21.3 HYPERPARATHYROIDISM (HCC): ICD-10-CM

## 2024-12-18 DIAGNOSIS — D35.1 PARATHYROID ADENOMA: ICD-10-CM

## 2024-12-18 PROCEDURE — 99214 OFFICE O/P EST MOD 30 MIN: CPT | Performed by: FAMILY MEDICINE

## 2024-12-18 RX ORDER — DEXTROAMPHETAMINE SACCHARATE, AMPHETAMINE ASPARTATE, DEXTROAMPHETAMINE SULFATE AND AMPHETAMINE SULFATE 5; 5; 5; 5 MG/1; MG/1; MG/1; MG/1
20 TABLET ORAL DAILY
Qty: 90 TABLET | Refills: 0 | Status: SHIPPED | OUTPATIENT
Start: 2024-12-18

## 2024-12-18 RX ORDER — SERTRALINE HYDROCHLORIDE 100 MG/1
100 TABLET, FILM COATED ORAL
Qty: 90 TABLET | Refills: 2 | Status: SHIPPED | OUTPATIENT
Start: 2024-12-18

## 2024-12-18 RX ORDER — DEXTROAMPHETAMINE SACCHARATE, AMPHETAMINE ASPARTATE, DEXTROAMPHETAMINE SULFATE AND AMPHETAMINE SULFATE 2.5; 2.5; 2.5; 2.5 MG/1; MG/1; MG/1; MG/1
10 TABLET ORAL DAILY PRN
Qty: 90 TABLET | Refills: 0 | Status: SHIPPED | OUTPATIENT
Start: 2024-12-18

## 2024-12-18 NOTE — PROGRESS NOTES
Subjective:      Patient ID: Lydia Orellana is a 64 y.o. female.    Here for follow up -  Hyperlipidemia: following low fat diet  Depression and anxiety- controlled on zoloft 100 mg qd,   ADHD-   Pt takes adderal 20 mg qd and recently has days when she has to take an extra in afternoon, she does take drug holiday  Obstructive sleep apnea- follows sleep medicine, plans on Inspire procedure in 2/2025  Parathyroid lesion -3 mm, saw endocrine, monitoring PTH, VIT D and calcium  Osteoporosis- started on weekly alendronate, denies any side effects, taking calcium and vit d supplements, needs refils  History of gastric bypass- taking multivitamin supplements  No CP/sob. No headaches.No GI upset or insomnia. No palpitations.             Past Medical History:   Diagnosis Date    ADHD     Anxiety     Arthritis     Asthma     Breast cancer (Formerly [facility]) 01/01/2012    Cancer (Formerly [facility]) 08/2012    DCIS-right breast    Closed fracture of radial styloid     Endometriosis     Family history of colon cancer in mother     Forceps delivery     1991 daughter    GERD (gastroesophageal reflux disease)     H/O mitral valve prolapse     no regurgitation    Hiatal hernia     History of radiation therapy     Hyperlipidemia     Infertility, female     Normal delivery     1998 daughter    Sleep apnea     on cpap    TB lung, latent     treated with INH (in her 30's)       Family History   Problem Relation Age of Onset    Colon cancer Mother 50    Cancer Mother         colon    Osteoporosis Mother     Stroke Father     Hypertension Father     Cancer Sister 71        spinal cancer    Kidney cancer Sister 70    No Known Problems Paternal Aunt     No Known Problems Maternal Grandmother     Colon cancer Maternal Grandfather 80    Cancer Maternal Grandfather         colon    Hypertension Paternal Grandmother     Stroke Paternal Grandmother     No Known Problems Paternal Grandfather     No Known Problems Daughter     No Known Problems Daughter     Hypertension  Family     Mitral valve prolapse Family     Osteoporosis Family     Varicose Veins Family     Melanoma Nephew        Past Surgical History:   Procedure Laterality Date    BREAST BIOPSY Right 04/01/2012    biopsy breast percutaneous needle core    BREAST LUMPECTOMY Right 08/01/2012    CHOLECYSTECTOMY  01/23/2013    COLONOSCOPY      DILATION AND CURETTAGE OF UTERUS      ENDOMETRIAL BIOPSY      without cervical dilation    EXAMINATION UNDER ANESTHESIA N/A 9/23/2024    Procedure: DRUG INDUCED SLEEP ENDOSCOPY;  Surgeon: Otis Malone MD;  [facility];  Service: ENT    KNEE ARTHROSCOPY      Plica syndrome    LAPAROSCOPY      Exploratory 1990 & 1994    SALUD-EN-Y PROCEDURE  01/23/2013    Dr Crawford    UPPER GASTROINTESTINAL ENDOSCOPY      US GUIDANCE  5/14/2013        reports that she has never smoked. She has never used smokeless tobacco. She reports current alcohol use. She reports that she does not use drugs.      Current Outpatient Medications:     alendronate (FOSAMAX) 70 mg tablet, Take 1 tablet (70 mg total) by mouth every 7 days Take once every week on empty stomach with water and stay upright for 1/2 hour after taking the medication, Disp: 12 tablet, Rfl: 0    amphetamine-dextroamphetamine (ADDERALL) 20 mg tablet, Take 1 tablet (20 mg total) by mouth daily Max Daily Amount: 20 mg, Disp: 90 tablet, Rfl: 0    amphetamine-dextroamphetamine (ADDERALL, 10MG,) 10 mg tablet, Take 1 tablet (10 mg total) by mouth daily as needed (at noon) Max Daily Amount: 10 mg, Disp: 90 tablet, Rfl: 0    calcium carbonate (OS-IVONE) 600 MG tablet, Take 600 mg by mouth 2 (two) times a day with meals, Disp: , Rfl:     Cholecalciferol (VITAMIN D) 2000 units CAPS, Take by mouth Take 5000IU daily, Disp: , Rfl:     Multiple Vitamin (MULTI-VITAMIN DAILY) TABS, Take by mouth, Disp: , Rfl:     omega-3-acid ethyl esters (LOVAZA) 1 g capsule, Take 2 g by mouth 2 (two) times a day, Disp: , Rfl:     sertraline (ZOLOFT) 100 mg tablet, Take  1 tablet (100 mg total) by mouth daily at bedtime, Disp: 90 tablet, Rfl: 2    The following portions of the patient's history were reviewed and updated as appropriate: allergies, current medications, past family history, past medical history, past social history, past surgical history and problem list.    Review of Systems   Constitutional:  Negative for fatigue and fever.   HENT:  Negative for congestion, facial swelling, mouth sores, rhinorrhea, sore throat and trouble swallowing.    Eyes:  Negative for pain and redness.   Respiratory:  Negative for cough, shortness of breath and wheezing.    Cardiovascular:  Negative for chest pain, palpitations and leg swelling.   Gastrointestinal:  Negative for abdominal pain, blood in stool, constipation, diarrhea and nausea.   Genitourinary:  Negative for dysuria, hematuria and urgency.   Musculoskeletal:  Negative for arthralgias, back pain and myalgias.   Skin:  Negative for rash and wound.   Neurological:  Negative for seizures, syncope and headaches.   Hematological:  Negative for adenopathy.   Psychiatric/Behavioral:  Negative for agitation and behavioral problems.          PHQ-2/9 Depression Screening                 Objective:    /60 (BP Location: Left arm, Patient Position: Sitting, Cuff Size: Standard)   Pulse 67   Ht 5' (1.524 m)   Wt 64 kg (141 lb)   SpO2 96%   BMI 27.54 kg/m²      Physical Exam  Vitals and nursing note reviewed.   Constitutional:       Appearance: Normal appearance. She is well-developed.   HENT:      Head: Normocephalic and atraumatic.      Right Ear: External ear normal.      Left Ear: External ear normal.      Nose: Nose normal.   Eyes:      General: No scleral icterus.        Right eye: No discharge.         Left eye: No discharge.      Conjunctiva/sclera: Conjunctivae normal.      Pupils: Pupils are equal, round, and reactive to light.   Neck:      Thyroid: No thyromegaly.   Cardiovascular:      Rate and Rhythm: Normal rate and  regular rhythm.      Heart sounds: Normal heart sounds. No murmur heard.     No gallop.   Pulmonary:      Effort: Pulmonary effort is normal.      Breath sounds: Normal breath sounds. No wheezing or rales.   Musculoskeletal:         General: No tenderness or deformity.      Cervical back: Normal range of motion and neck supple.   Lymphadenopathy:      Cervical: No cervical adenopathy.   Skin:     Findings: No erythema or rash.   Neurological:      General: No focal deficit present.      Mental Status: She is alert. Mental status is at baseline.   Psychiatric:         Mood and Affect: Mood normal.         Behavior: Behavior normal.           Recent Results (from the past 52 weeks)   [facility] Molecular Screen    Collection Time: 01/11/24  4:45 PM    Specimen: Arm, Right; Blood   Result Value Ref Range    GENETIC ANALYSIS OVERALL INTERPRETATION TNP    [facility] Molecular Screen    Collection Time: 03/07/24  3:22 PM    Specimen: Blood   Result Value Ref Range    DILLARD SYNDROME DNA ANALYSIS Not Detected     HEREDITARY BREAST & OVARIAN CANCER DNA ANALYSIS Not Detected     FAMILIAL HYPERCHOLESTEROLEMIA DNA ANALYSIS Not Detected    Hemoglobin A1C    Collection Time: 04/01/24  9:34 AM   Result Value Ref Range    Hemoglobin A1C 5.3 Normal 4.0-5.6%; PreDiabetic 5.7-6.4%; Diabetic >=6.5%; Glycemic control for adults with diabetes <7.0% %     mg/dl   Lipid panel    Collection Time: 04/01/24  9:34 AM   Result Value Ref Range    Cholesterol 233 (H) See Comment mg/dL    Triglycerides 111 See Comment mg/dL    HDL, Direct 70 >=50 mg/dL    LDL Calculated 141 (H) 0 - 100 mg/dL    Non-HDL-Chol (CHOL-HDL) 163 mg/dl   CBC and differential    Collection Time: 09/04/24  8:03 AM   Result Value Ref Range    WBC 6.17 4.31 - 10.16 Thousand/uL    RBC 4.34 3.81 - 5.12 Million/uL    Hemoglobin 13.0 11.5 - 15.4 g/dL    Hematocrit 40.3 34.8 - 46.1 %    MCV 93 82 - 98 fL    MCH 30.0 26.8 - 34.3 pg    MCHC 32.3 31.4 - 37.4 g/dL    RDW 13.0 11.6 - 15.1  %    MPV 10.4 8.9 - 12.7 fL    Platelets 193 149 - 390 Thousands/uL    nRBC 0 /100 WBCs    Segmented % 56 43 - 75 %    Immature Grans % 0 0 - 2 %    Lymphocytes % 35 14 - 44 %    Monocytes % 8 4 - 12 %    Eosinophils Relative 1 0 - 6 %    Basophils Relative 0 0 - 1 %    Absolute Neutrophils 3.44 1.85 - 7.62 Thousands/µL    Absolute Immature Grans 0.02 0.00 - 0.20 Thousand/uL    Absolute Lymphocytes 2.16 0.60 - 4.47 Thousands/µL    Absolute Monocytes 0.48 0.17 - 1.22 Thousand/µL    Eosinophils Absolute 0.05 0.00 - 0.61 Thousand/µL    Basophils Absolute 0.02 0.00 - 0.10 Thousands/µL   Basic metabolic panel    Collection Time: 09/04/24  8:03 AM   Result Value Ref Range    Sodium 140 135 - 147 mmol/L    Potassium 4.0 3.5 - 5.3 mmol/L    Chloride 105 96 - 108 mmol/L    CO2 27 21 - 32 mmol/L    ANION GAP 8 4 - 13 mmol/L    BUN 17 5 - 25 mg/dL    Creatinine 0.74 0.60 - 1.30 mg/dL    Glucose, Fasting 92 65 - 99 mg/dL    Calcium 9.0 8.4 - 10.2 mg/dL    eGFR 85 ml/min/1.73sq m   ECG 12 lead    Collection Time: 09/04/24  8:08 AM   Result Value Ref Range    Ventricular Rate 54 BPM    Atrial Rate 54 BPM    SD Interval 176 ms    QRSD Interval 74 ms    QT Interval 458 ms    QTC Interval 434 ms    P [place] 48 degrees    QRS Axis 77 degrees    T Wave Axis 84 degrees   Phosphorus    Collection Time: 12/17/24 11:10 AM   Result Value Ref Range    Phosphorus 2.7 2.3 - 4.1 mg/dL   Vitamin D 25 hydroxy    Collection Time: 12/17/24 11:10 AM   Result Value Ref Range    Vit D, 25-Hydroxy 95.5 30.0 - 100.0 ng/mL   PTH, intact    Collection Time: 12/17/24 11:10 AM   Result Value Ref Range    PTH 56.6 12.0 - 88.0 pg/mL   Comprehensive metabolic panel    Collection Time: 12/17/24 11:10 AM   Result Value Ref Range    Sodium 138 135 - 147 mmol/L    Potassium 3.7 3.5 - 5.3 mmol/L    Chloride 103 96 - 108 mmol/L    CO2 31 21 - 32 mmol/L    ANION GAP 4 4 - 13 mmol/L    BUN 18 5 - 25 mg/dL    Creatinine 0.64 0.60 - 1.30 mg/dL    Glucose 92 65 - 140  "mg/dL    Calcium 8.9 8.4 - 10.2 mg/dL    AST 24 13 - 39 U/L    ALT 21 7 - 52 U/L    Alkaline Phosphatase 38 34 - 104 U/L    Total Protein 6.1 (L) 6.4 - 8.4 g/dL    Albumin 4.1 3.5 - 5.0 g/dL    Total Bilirubin 0.51 0.20 - 1.00 mg/dL    eGFR 93 ml/min/1.73sq m       Laboratory Results: I have personally reviewed the pertinent laboratory results/reports         Assessment/Plan:  1. FREIDA on CPAP  2. Mixed anxiety and depressive disorder  -     sertraline (ZOLOFT) 100 mg tablet; Take 1 tablet (100 mg total) by mouth daily at bedtime  3. Attention deficit hyperactivity disorder (ADHD), unspecified ADHD type  Overview:  Bupropion ineffective  adderall XR 10 mg 5/12/21  Orders:  -     amphetamine-dextroamphetamine (ADDERALL) 20 mg tablet; Take 1 tablet (20 mg total) by mouth daily Max Daily Amount: 20 mg  -     amphetamine-dextroamphetamine (ADDERALL, 10MG,) 10 mg tablet; Take 1 tablet (10 mg total) by mouth daily as needed (at noon) Max Daily Amount: 10 mg  4. Hyperparathyroidism (HCC)  5. Parathyroid adenoma  6. Hypercholesterolemia  7. Overweight with body mass index (BMI) of 27 to 27.9 in adult  Suggest Adderral 10 mg in afternoon, continue adderral 20 mg daily morning, can use caffeine as a stimulant as well instead of the second dose of Adderral in afternoon.  Recommend continue zoloft  Reviewed and discussed normal cbc, cmp, vit d, pth. Discussed to decrease vit d 3 supplement to 1 tablet a day                 Read package inserts for all medications before starting a new medications, call me if you have any questions.    Patient was given opportunity to ask questions and all questions were answered.    Disclaimer: Portions of the record may have been created with voice recognition software. Occasional wrong word or \"sound a like\" substitutions may have occurred due to the inherent limitations of voice recognition software. Read the chart carefully and recognize, using context, where substitutions have occurred. I " have used the Epic copy/forward function to compose this note. I have reviewed my current note to ensure it reflects the current patient status, exam, assessment and plan.

## 2024-12-24 ENCOUNTER — OFFICE VISIT (OUTPATIENT)
Dept: ENDOCRINOLOGY | Facility: CLINIC | Age: 64
End: 2024-12-24
Payer: COMMERCIAL

## 2024-12-24 VITALS
WEIGHT: 140.4 LBS | BODY MASS INDEX: 27.56 KG/M2 | DIASTOLIC BLOOD PRESSURE: 68 MMHG | HEART RATE: 57 BPM | OXYGEN SATURATION: 98 % | SYSTOLIC BLOOD PRESSURE: 112 MMHG | TEMPERATURE: 98.3 F | HEIGHT: 60 IN

## 2024-12-24 DIAGNOSIS — E21.3 HYPERPARATHYROIDISM (HCC): ICD-10-CM

## 2024-12-24 DIAGNOSIS — M81.8 OTHER OSTEOPOROSIS WITHOUT CURRENT PATHOLOGICAL FRACTURE: Primary | ICD-10-CM

## 2024-12-24 DIAGNOSIS — D35.1 PARATHYROID ADENOMA: ICD-10-CM

## 2024-12-24 PROCEDURE — 99214 OFFICE O/P EST MOD 30 MIN: CPT | Performed by: INTERNAL MEDICINE

## 2024-12-24 NOTE — ASSESSMENT & PLAN NOTE
She is adehrent to diet and lifestyle changes and feels well. Biochemical bone markers are normal. PTH has improved. We agreed to continue Fosamax for total 5 years as she seems to be responding.  DXA next year - order placed.    Follow up in 1 year.

## 2024-12-24 NOTE — PROGRESS NOTES
Follow-up Patient Progress Note      CC: hyperparathyroidism     History of Present Illness:   62yr female post RYGB 2012, osteoporosis, vitamin D deficiency, ADHD, DCIS breast 2013 s/p lumpectomy and radiation therapy, FREIDA, HTN, HLD and elevated PTH. Last visit was 7/14/23.     She loat 8 lbs since last visit voluntarily.    Hx of menopause with LMP age 40. She had hx of lt and rt wrist fractures 4 yrs ago. Recent fall and off balance more recently.     Max adult weight 204lbs prior to RYGB. Presently 141lb.     She had a thyroid US 8/8/22 that showed a 6mm lesion posterior to Lt upper pole thyroid. Follow up CT parathyroid showed 3mm lesion without characteristic enhancement for a parathyroid adenoma.     DXA 5/21/21: Tscores -3.3 LS, -2.5 LTH and -3.8LFN. 10yr FRAX 8% hip and 20% major osteoporotic fracture.  7/6/2023 DXA: T-scores -3.4 LS, -2 LTH, -3.4 LFN.  Statistically significant increase in BMD.  10-year FRAX score 6.2% hip and 18% major osteoporotic fracture.    8/18/22 CT parathyroid: 0.3 x 0.3 x 0.5 mm structure identified posterior to the left thyroid lobe corresponds to the lesion identified on ultrasound, however, it does not meet the CT enhancement criteria to suggest parathyroid adenoma     Fosamax was started 11/14/22      No History of external radiation, recent Iodine loading or radiological diagnostic studies.   No difficulty with swallowing or breathing or change in voice.     FH : mother had severe osteoporosis age 50's.      Physical Exam:  Body mass index is 27.42 kg/m².  /68 (BP Location: Left arm, Patient Position: Sitting, Cuff Size: Adult)   Pulse 57   Temp 98.3 °F (36.8 °C) (Tympanic)   Ht 5' (1.524 m)   Wt 63.7 kg (140 lb 6.4 oz)   SpO2 98%   BMI 27.42 kg/m²    Vitals:    12/24/24 0844   Weight: 63.7 kg (140 lb 6.4 oz)        Physical Exam  Constitutional:       General: She is not in acute distress.     Appearance: She is well-developed.   HENT:      Head:  Normocephalic and atraumatic.      Nose: Nose normal.   Eyes:      Conjunctiva/sclera: Conjunctivae normal.   Pulmonary:      Effort: Pulmonary effort is normal.   Abdominal:      General: There is no distension.   Musculoskeletal:      Cervical back: Normal range of motion and neck supple.   Skin:     Findings: No rash.      Comments: No icterus   Neurological:      Mental Status: She is alert and oriented to person, place, and time.         Labs:   Lab Results   Component Value Date    HGBA1C 5.3 04/01/2024       Lab Results   Component Value Date    QPY0BWHZSYRY 3.950 11/08/2022       Lab Results   Component Value Date    CREATININE 0.64 12/17/2024    CREATININE 0.74 09/04/2024    CREATININE 0.74 11/29/2023    BUN 18 12/17/2024    K 3.7 12/17/2024     12/17/2024    CO2 31 12/17/2024     eGFR   Date Value Ref Range Status   12/17/2024 93 ml/min/1.73sq m Final       Lab Results   Component Value Date    ALT 21 12/17/2024    AST 24 12/17/2024    ALKPHOS 38 12/17/2024       Lab Results   Component Value Date    CHOLESTEROL 233 (H) 04/01/2024    CHOLESTEROL 219 (H) 05/13/2023    CHOLESTEROL 211 (H) 04/04/2023     Lab Results   Component Value Date    HDL 70 04/01/2024    HDL 60 05/13/2023    HDL 60 04/04/2023     Lab Results   Component Value Date    TRIG 111 04/01/2024    TRIG 77 05/13/2023    TRIG 112 04/04/2023     Lab Results   Component Value Date    NONHDLC 163 04/01/2024    NONHDLC 159 05/13/2023    NONHDLC 151 04/04/2023         Assessment/Plan:    1. Other osteoporosis without current pathological fracture  Assessment & Plan:  She is adehrent to diet and lifestyle changes and feels well. Biochemical bone markers are normal. PTH has improved. We agreed to continue Fosamax for total 5 years as she seems to be responding.  DXA next year - order placed.    Follow up in 1 year.  Orders:  -     DXA bone density spine hip and pelvis; Future; Expected date: 12/24/2024  -     Vitamin D 25 hydroxy; Future  -      PTH, intact; Future  -     Phosphorus; Future  -     Comprehensive metabolic panel; Future  2. Hyperparathyroidism (HCC)  Assessment & Plan:  Suspect this was secondary as there was significant improvement with calcium and vitamin D replacement.  3. Parathyroid adenoma  Assessment & Plan:  Possibly secondary but there is a 3mm lesion behind lt lower pole thyroid. Will monitor for PHPT in future.     8/18/22 CT parathyroid: 0.3 x 0.3 x 0.5 mm structure identified posterior to the left thyroid lobe corresponds to the lesion identified on ultrasound, however, it does not meet the CT enhancement criteria to suggest parathyroid adenoma         I have spent a total time of  minutes on 12/24/24 in caring for this patient including greater than 50% of this time was spent in counseling/coordination of care as listed above.       Discussed with the patient and all questioned fully answered. She will contact me with concerns.    Pallavi Bueno

## 2024-12-24 NOTE — ASSESSMENT & PLAN NOTE
Suspect this was secondary as there was significant improvement with calcium and vitamin D replacement.

## 2024-12-24 NOTE — ASSESSMENT & PLAN NOTE
Possibly secondary but there is a 3mm lesion behind lt lower pole thyroid. Will monitor for PHPT in future.     8/18/22 CT parathyroid: 0.3 x 0.3 x 0.5 mm structure identified posterior to the left thyroid lobe corresponds to the lesion identified on ultrasound, however, it does not meet the CT enhancement criteria to suggest parathyroid adenoma

## 2024-12-30 ENCOUNTER — HOSPITAL ENCOUNTER (OUTPATIENT)
Dept: RADIOLOGY | Age: 64
Discharge: HOME | End: 2024-12-30
Payer: COMMERCIAL

## 2024-12-30 DIAGNOSIS — Z12.31 VISIT FOR SCREENING MAMMOGRAM: ICD-10-CM

## 2024-12-30 PROCEDURE — 77067 SCR MAMMO BI INCL CAD: CPT

## 2024-12-30 PROCEDURE — 77063 BREAST TOMOSYNTHESIS BI: CPT

## 2025-01-06 ENCOUNTER — RESULTS FOLLOW-UP (OUTPATIENT)
Dept: FAMILY MEDICINE CLINIC | Facility: CLINIC | Age: 65
End: 2025-01-06

## 2025-01-06 ENCOUNTER — OFFICE VISIT (OUTPATIENT)
Dept: SURGICAL ONCOLOGY | Facility: CLINIC | Age: 65
End: 2025-01-06
Payer: COMMERCIAL

## 2025-01-06 VITALS
TEMPERATURE: 98.4 F | DIASTOLIC BLOOD PRESSURE: 70 MMHG | WEIGHT: 138 LBS | BODY MASS INDEX: 27.09 KG/M2 | HEART RATE: 72 BPM | HEIGHT: 60 IN | OXYGEN SATURATION: 97 % | RESPIRATION RATE: 16 BRPM | SYSTOLIC BLOOD PRESSURE: 114 MMHG

## 2025-01-06 DIAGNOSIS — Z12.11 SCREENING FOR COLORECTAL CANCER: ICD-10-CM

## 2025-01-06 DIAGNOSIS — Z12.12 SCREENING FOR COLORECTAL CANCER: ICD-10-CM

## 2025-01-06 DIAGNOSIS — Z86.000 HISTORY OF DUCTAL CARCINOMA IN SITU (DCIS) OF BREAST: Primary | ICD-10-CM

## 2025-01-06 DIAGNOSIS — Z86.000 ENCOUNTER FOR FOLLOW-UP SURVEILLANCE OF DUCTAL CARCINOMA IN SITU OF BREAST: ICD-10-CM

## 2025-01-06 DIAGNOSIS — Z12.4 SCREENING FOR CERVICAL CANCER: ICD-10-CM

## 2025-01-06 DIAGNOSIS — Z08 ENCOUNTER FOR FOLLOW-UP SURVEILLANCE OF DUCTAL CARCINOMA IN SITU OF BREAST: ICD-10-CM

## 2025-01-06 DIAGNOSIS — M81.0 AGE-RELATED OSTEOPOROSIS WITHOUT CURRENT PATHOLOGICAL FRACTURE: ICD-10-CM

## 2025-01-06 PROCEDURE — 99214 OFFICE O/P EST MOD 30 MIN: CPT | Performed by: STUDENT IN AN ORGANIZED HEALTH CARE EDUCATION/TRAINING PROGRAM

## 2025-01-06 NOTE — PROGRESS NOTES
Name: Lydia Orlelana      : 1960      MRN: [id number]  Encounter Provider: Cassandra Lynn Cardarelli, MD  Encounter Date: 2025   Encounter department: [facility]  :  Assessment & Plan  History of ductal carcinoma in situ (DCIS) of breast  64-year-old female with remote history of DCIS of the right breast that is postlumpectomy followed by adjuvant radiation.  Patient has no breast complaints today.  Her most recent mammogram had no abnormal findings.    Patient was offered the opportunity to follow-up with her PCM who would also order her screening mammogram.  Patient declined at this time would like to continue follow-up in the breast surgery clinic.    Her screening mammogram was ordered for 2026.  Patient was instructed to contact the clinic should she have any abnormal breast findings in the interim as a diagnostic mammogram would then be ordered.  Patient indicated understanding of the above.  Orders:  •  Mammo screening bilateral w 3d and cad; Future    Encounter for follow-up surveillance of ductal carcinoma in situ of breast         Screening for colorectal cancer  She had last colonoscopy in approximately . Regular bowel movements and denies any blood or mucus in the stool. Good appetite, no recent weight loss. Denies any heartburn acid reflux.  She is due in  per her report.        Screening for cervical cancer  Her most recent PAP was reviewed from 2023. She is post menopausal and denies any vaginal bleeding.  Patient has gynecology follow-up       Age-related osteoporosis without current pathological fracture  I reviewed patient's most recent DEXA scan from 2023.  Patient is aware she is due for a DEXA scan this upcoming summer.  Order has already been placed.  Patient has no current fractures.     1. Osteoporosis.     2.  Since a DXA study from 2021, there has been:  A  STATISTICALLY SIGNIFICANT INCREASE in bone mineral  density of 0.054 g/cm2 (8.5%) in the left total hip.               History of Present Illness   Lydia Orellana is a 64 y.o. year old female who presents today for annual follow-up.  She has a remote history of DCIS of the right breast.  She underwent adjuvant radiation, but no endocrine therapy.  She has no breast complaints today.  She underwent her annual screening mammogram on 12/30/2024 and no abnormalities were noted.    Oncology History   Cancer Staging   History of ductal carcinoma in situ (DCIS) of breast  Staging form: Breast, AJCC 7th Edition  - Pathologic stage from 6/25/2013: Stage 0 (Tis (DCIS), N0, cM0) - Signed by Benjamin Danielle MD on 4/16/2018  Staged by: Pathologist and managing physician  Specimen type: Excision  Laterality: Right  Tumor size (mm): 15  Method of lymph node assessment: Clinical  Histologic grade (G): G1  Estrogen receptor status: Positive  Percentage of positive estrogen receptors (%): 100  Progesterone receptor status: Positive  Percentage of positive progesterone receptors (%): 100  Oncology History   History of ductal carcinoma in situ (DCIS) of breast   5/14/2013 Initial Diagnosis    Biopsy at [facility]  Right Atypical ductal hyperplasia     6/25/2013 Surgery    Right needle localization lumpectomy (Dr. Danielle)  DCIS  Grade 1  ,   DCIS 0.6 mm from inferior margin and 1.2 mm from posterior margin  Stage 0     7/2013 -  Hormone Therapy    Med onc consult  No systemic therapy recommended     7/30/2013 Surgery    Re-excision inferior and posterior margins (Dr. Danielle)  No evidence of residual tumor identified  Changes consistent with previous procedure site     9/6/2013 - 10/23/2013 Radiation    Whole breast Radiation therapy        Review of Systems A complete review of systems is negative other than that noted above in the HPI.           Objective   /70 (Patient Position: Sitting, Cuff Size: Standard)   Pulse 72   Temp 98.4 °F (36.9 °C) (Temporal)   Resp 16    Ht 5' (1.524 m)   Wt 62.6 kg (138 lb)   SpO2 97%   BMI 26.95 kg/m²     Pain Screening:     ECOG    Physical Exam  Vitals reviewed.   Constitutional:       Appearance: Normal appearance.   HENT:      Head: Normocephalic and atraumatic.      Mouth/Throat:      Mouth: Mucous membranes are moist.   Cardiovascular:      Rate and Rhythm: Normal rate and regular rhythm.      Pulses: Normal pulses.      Heart sounds: Normal heart sounds.   Pulmonary:      Effort: Pulmonary effort is normal.      Breath sounds: Normal breath sounds.   Chest:   Breasts:     Right: Normal.      Left: Normal.          Comments: The left breast was examined in the sitting and supine position.  There are no worrisome skin changes, tenderness, inverted nipple, nipple discharge, swelling, bleeding or evidence of a mass in any quadrant.  Brian survey demonstrated no evidence of any clinically suspicious axillary, pectoral or paraclavicular lymph nodes    The right breast was examined in the sitting and supine position.  There are no worrisome skin changes, tenderness, inverted nipple, nipple discharge, swelling, bleeding or evidence of a mass in any quadrant.  Brian survey demonstrated no evidence of any clinically suspicious axillary, pectoral or paraclavicular lymph nodes.  Patient does have notable asymmetry with contour defect on the outer inferior aspect of the right breast.  Abdominal:      General: Abdomen is flat.      Palpations: Abdomen is soft.   Musculoskeletal:      Right lower leg: No edema.      Left lower leg: No edema.   Lymphadenopathy:      Upper Body:      Right upper body: No supraclavicular, axillary or pectoral adenopathy.      Left upper body: No supraclavicular, axillary or pectoral adenopathy.   Skin:     General: Skin is warm.   Neurological:      General: No focal deficit present.      Mental Status: She is alert and oriented to person, place, and time.   Psychiatric:         Mood and Affect: Mood normal.          Behavior: Behavior normal.         Thought Content: Thought content normal.          Labs: I have reviewed pertinent labs.   Appointment on 12/17/2024   Component Date Value Ref Range Status   • Phosphorus 12/17/2024 2.7  2.3 - 4.1 mg/dL Final   • Vit D, 25-Hydroxy 12/17/2024 95.5  30.0 - 100.0 ng/mL Final    Vitamin D guidelines established by Clinical Guidelines Subcommittee  of the Endocrine Society Task Force, 2011    Deficiency <20ng/ml   Insufficiency 20-30ng/ml   Sufficient  ng/ml    • PTH 12/17/2024 56.6  12.0 - 88.0 pg/mL Final   • Sodium 12/17/2024 138  135 - 147 mmol/L Final   • Potassium 12/17/2024 3.7  3.5 - 5.3 mmol/L Final   • Chloride 12/17/2024 103  96 - 108 mmol/L Final   • CO2 12/17/2024 31  21 - 32 mmol/L Final   • ANION GAP 12/17/2024 4  4 - 13 mmol/L Final   • BUN 12/17/2024 18  5 - 25 mg/dL Final   • Creatinine 12/17/2024 0.64  0.60 - 1.30 mg/dL Final    Standardized to IDMS reference method   • Glucose 12/17/2024 92  65 - 140 mg/dL Final    If the patient is fasting, the ADA then defines impaired fasting glucose as > 100 mg/dL and diabetes as > or equal to 123 mg/dL.   • Calcium 12/17/2024 8.9  8.4 - 10.2 mg/dL Final   • AST 12/17/2024 24  13 - 39 U/L Final   • ALT 12/17/2024 21  7 - 52 U/L Final    Specimen collection should occur prior to Sulfasalazine administration due to the potential for falsely depressed results.    • Alkaline Phosphatase 12/17/2024 38  34 - 104 U/L Final   • Total Protein 12/17/2024 6.1 (L)  6.4 - 8.4 g/dL Final   • Albumin 12/17/2024 4.1  3.5 - 5.0 g/dL Final   • Total Bilirubin 12/17/2024 0.51  0.20 - 1.00 mg/dL Final    Use of this assay is not recommended for patients undergoing treatment with eltrombopag due to the potential for falsely elevated results.  N-acetyl-p-benzoquinone imine (metabolite of Acetaminophen) will generate erroneously low results in samples for patients that have taken an overdose of Acetaminophen.   • eGFR 12/17/2024 93  ml/min/1.73sq  m Final       Pathology: I have reviewed pathology reports described above.    Radiology Results Review: I personally reviewed the following image studies in PACS and associated radiology reports: Screening mammogram from 12/30/2024.  I have also reviewed her most recent DEXA scan.  Patient has a diagnosis of osteoporosis.  My interpretation of the radiology images/reports is: There are no abnormal findings on her mammogram.  Her DEXA scan demonstrates osteoporosis.      Administrative Statements   I have spent a total time of 30 minutes in caring for this patient on the day of the visit/encounter including Risk factor reductions, Impressions, Documenting in the medical record, Reviewing / ordering tests, medicine, procedures  , and Obtaining or reviewing history  .

## 2025-01-24 ENCOUNTER — APPOINTMENT (OUTPATIENT)
Dept: LAB | Age: 65
End: 2025-01-24
Payer: COMMERCIAL

## 2025-01-24 DIAGNOSIS — Z01.818 PRE-OPERATIVE EXAMINATION: ICD-10-CM

## 2025-01-24 DIAGNOSIS — G47.33 OBSTRUCTIVE SLEEP APNEA (ADULT) (PEDIATRIC): ICD-10-CM

## 2025-01-24 LAB
ANION GAP SERPL CALCULATED.3IONS-SCNC: 5 MMOL/L (ref 4–13)
BASOPHILS # BLD AUTO: 0.03 THOUSANDS/ΜL (ref 0–0.1)
BASOPHILS NFR BLD AUTO: 0 % (ref 0–1)
BUN SERPL-MCNC: 19 MG/DL (ref 5–25)
CALCIUM SERPL-MCNC: 9.6 MG/DL (ref 8.4–10.2)
CHLORIDE SERPL-SCNC: 104 MMOL/L (ref 96–108)
CO2 SERPL-SCNC: 30 MMOL/L (ref 21–32)
CREAT SERPL-MCNC: 0.88 MG/DL (ref 0.6–1.3)
EOSINOPHIL # BLD AUTO: 0.07 THOUSAND/ΜL (ref 0–0.61)
EOSINOPHIL NFR BLD AUTO: 1 % (ref 0–6)
ERYTHROCYTE [DISTWIDTH] IN BLOOD BY AUTOMATED COUNT: 13.1 % (ref 11.6–15.1)
GFR SERPL CREATININE-BSD FRML MDRD: 69 ML/MIN/1.73SQ M
GLUCOSE SERPL-MCNC: 74 MG/DL (ref 65–140)
HCT VFR BLD AUTO: 40.2 % (ref 34.8–46.1)
HGB BLD-MCNC: 12.8 G/DL (ref 11.5–15.4)
IMM GRANULOCYTES # BLD AUTO: 0.02 THOUSAND/UL (ref 0–0.2)
IMM GRANULOCYTES NFR BLD AUTO: 0 % (ref 0–2)
LYMPHOCYTES # BLD AUTO: 2.76 THOUSANDS/ΜL (ref 0.6–4.47)
LYMPHOCYTES NFR BLD AUTO: 40 % (ref 14–44)
MCH RBC QN AUTO: 29.8 PG (ref 26.8–34.3)
MCHC RBC AUTO-ENTMCNC: 31.8 G/DL (ref 31.4–37.4)
MCV RBC AUTO: 94 FL (ref 82–98)
MONOCYTES # BLD AUTO: 0.59 THOUSAND/ΜL (ref 0.17–1.22)
MONOCYTES NFR BLD AUTO: 9 % (ref 4–12)
NEUTROPHILS # BLD AUTO: 3.49 THOUSANDS/ΜL (ref 1.85–7.62)
NEUTS SEG NFR BLD AUTO: 50 % (ref 43–75)
NRBC BLD AUTO-RTO: 0 /100 WBCS
PLATELET # BLD AUTO: 206 THOUSANDS/UL (ref 149–390)
PMV BLD AUTO: 10.3 FL (ref 8.9–12.7)
POTASSIUM SERPL-SCNC: 4 MMOL/L (ref 3.5–5.3)
RBC # BLD AUTO: 4.3 MILLION/UL (ref 3.81–5.12)
SODIUM SERPL-SCNC: 139 MMOL/L (ref 135–147)
WBC # BLD AUTO: 6.96 THOUSAND/UL (ref 4.31–10.16)

## 2025-01-24 PROCEDURE — 85025 COMPLETE CBC W/AUTO DIFF WBC: CPT

## 2025-01-24 PROCEDURE — 80048 BASIC METABOLIC PNL TOTAL CA: CPT

## 2025-01-24 PROCEDURE — 36415 COLL VENOUS BLD VENIPUNCTURE: CPT

## 2025-02-03 NOTE — PRE-PROCEDURE INSTRUCTIONS
Pre-Surgery Instructions:   Medication Instructions    alendronate (FOSAMAX) 70 mg tablet Instructions provided by MD    amphetamine-dextroamphetamine (ADDERALL) 20 mg tablet Hold day of surgery.    amphetamine-dextroamphetamine (ADDERALL, 10MG,) 10 mg tablet Hold day of surgery.    calcium carbonate (OS-IVONE) 600 MG tablet Hold day of surgery.    Multiple Vitamin (MULTI-VITAMIN DAILY) TABS Stop taking 3 days prior to surgery. Call done 2/3    omega-3-acid ethyl esters (LOVAZA) 1 g capsule Stop taking 3 days prior to surgery. Calldone 2/3    sertraline (ZOLOFT) 100 mg tablet Take night before surgery    Medication instructions for day surgery reviewed. Please use only a sip of water to take your instructed medications. Avoid all over the counter vitamins, supplements and NSAIDS for one week prior to surgery per anesthesia guidelines. Tylenol is ok to take as needed.     You will receive a call one business day prior to surgery with an arrival time and hospital directions. If your surgery is scheduled on a Monday, the hospital will be calling you on the Friday prior to your surgery. If you have not heard from anyone by 8pm, please call the hospital supervisor through the hospital  at [phone]. ([place] [phone] or [place] [phone]).    Do not eat or drink anything after midnight the night before your surgery, including candy, mints, lifesavers, or chewing gum. Do not drink alcohol 24hrs before your surgery. Try not to smoke at least 24hrs before your surgery.       Follow the pre surgery showering instructions as listed in the “My Surgical Experience Booklet” or otherwise provided by your surgeon's office. Do not use a blade to shave the surgical area 1 week before surgery. It is okay to use a clean electric clippers up to 24 hours before surgery. Do not apply any lotions, creams, including makeup, cologne, deodorant, or perfumes after showering on the day of your surgery. Do not use dry shampoo,  hair spray, hair gel, or any type of hair products.     No contact lenses, eye make-up, or artificial eyelashes. Remove nail polish, including gel polish, and any artificial, gel, or acrylic nails if possible. Remove all jewelry including rings and body piercing jewelry.     Wear causal clothing that is easy to take on and off. Consider your type of surgery.    Keep any valuables, jewelry, piercings at home. Please bring any specially ordered equipment (sling, braces) if indicated.    Arrange for a responsible person to drive you to and from the hospital on the day of your surgery. Please confirm the visitor policy for the day of your procedure when you receive your phone call with an arrival time.     Call the surgeon's office with any new illnesses, exposures, or additional questions prior to surgery.    Please reference your “My Surgical Experience Booklet” for additional information to prepare for your upcoming surgery.

## 2025-02-06 ENCOUNTER — ANESTHESIA EVENT (OUTPATIENT)
Dept: PERIOP | Facility: HOSPITAL | Age: 65
End: 2025-02-06
Payer: COMMERCIAL

## 2025-02-06 ENCOUNTER — APPOINTMENT (OUTPATIENT)
Dept: RADIOLOGY | Facility: HOSPITAL | Age: 65
End: 2025-02-06
Payer: COMMERCIAL

## 2025-02-06 ENCOUNTER — HOSPITAL ENCOUNTER (OUTPATIENT)
Facility: HOSPITAL | Age: 65
Setting detail: OUTPATIENT SURGERY
Discharge: HOME | End: 2025-02-06
Attending: OTOLARYNGOLOGY | Admitting: OTOLARYNGOLOGY
Payer: COMMERCIAL

## 2025-02-06 ENCOUNTER — ANESTHESIA (OUTPATIENT)
Dept: PERIOP | Facility: HOSPITAL | Age: 65
End: 2025-02-06
Payer: COMMERCIAL

## 2025-02-06 VITALS
OXYGEN SATURATION: 98 % | BODY MASS INDEX: 27.09 KG/M2 | HEART RATE: 69 BPM | SYSTOLIC BLOOD PRESSURE: 158 MMHG | RESPIRATION RATE: 16 BRPM | HEIGHT: 60 IN | WEIGHT: 138 LBS | TEMPERATURE: 97.5 F | DIASTOLIC BLOOD PRESSURE: 75 MMHG

## 2025-02-06 DIAGNOSIS — G47.33 OBSTRUCTIVE SLEEP APNEA (ADULT) (PEDIATRIC): Primary | ICD-10-CM

## 2025-02-06 PROCEDURE — C1778 LEAD, NEUROSTIMULATOR: HCPCS | Performed by: OTOLARYNGOLOGY

## 2025-02-06 PROCEDURE — 64582 OPN MPLTJ HPGLSL NSTM ARY PG: CPT | Performed by: OTOLARYNGOLOGY

## 2025-02-06 PROCEDURE — 71045 X-RAY EXAM CHEST 1 VIEW: CPT

## 2025-02-06 PROCEDURE — C1767 GENERATOR, NEURO NON-RECHARG: HCPCS | Performed by: OTOLARYNGOLOGY

## 2025-02-06 PROCEDURE — C1787 PATIENT PROGR, NEUROSTIM: HCPCS | Performed by: OTOLARYNGOLOGY

## 2025-02-06 PROCEDURE — 72040 X-RAY EXAM NECK SPINE 2-3 VW: CPT

## 2025-02-06 DEVICE — THE INSPIRE® RESPIRATORY SENSING LEAD (MODEL 4340) IS DESIGNED TO DETECT RESPIRATORY EFFORT. THE LEAD FEATURES A PRESSURE SENSITIVE MEMBRANE THAT CONVERTS THE MECHANICAL ENERGY OF RESPIRATION INTO AN ELECTRICAL SIGNAL. THE LEAD INCORPORATES A STANDARD CONNECTOR FOR COUPLING TO THE INSPIRE IMPLANTABLE PULSE GENERATOR (IPG) FOR TREATMENT OF OBSTRUCTIVE SLEEP APNEA.
Type: IMPLANTABLE DEVICE | Site: CHEST | Status: FUNCTIONAL
Brand: INSPIRE

## 2025-02-06 DEVICE — THE MODEL 3028 IPG CONTAINS ELECTRONICS AND A BATTERY THAT ARE SEALED INSIDE A TITANIUM CASE.  THE IPG IS IMPLANTED SUBCUTANEOUSLY, BELOW THE CLAVICLE IN THE UPPER CHEST, AND CONNECT TO THE STIMULATION LEAD AND SENSING LEAD.  THE MODEL 3028 DOES NOT CONTAIN ANY SOFTWARE OR FIRMWARE.  ALL FUNCTIONS INCLUDING THE TELEMETRY AND ALGORITHM HAVE BEEN DESIGNED INTO THE HARDWARE OF THE IPG.  THE ALGORITHM SYNCHRONIZES STIMULATION OF THE HYPOGLOSSAL NERVE WITH RESPIRATION SIGNALS.   THE IPG PROCESSES THE SAME DYNAMIC RANGE OF PRESSURE SIGNALS (2-48 CMH2O) IN ORDER TO ACCOUNT FOR PRESSURE READING VARIABILITY.  BASED ON TYPICAL SETTINGS FROM THE STAR PIVOTAL TRIAL, THE LONGEVITY OF THE MODEL 3028’S BATTERY WILL AVERAGE 10 YEARS ALTHOUGH THE DEVICE IS SMALLER THAN THE CURRENTLY APPROVED VERSION (MODEL 3024).  IN ADDITION THE MODEL 3028 IPG WILL ALLOW PATIENTS TO SAFELY UNDERGO MAGNETIC RESONANCE IMAGING (MRI) UNDER SPECIFIED CONDITIONS.
Type: IMPLANTABLE DEVICE | Site: CHEST | Status: FUNCTIONAL
Brand: INSPIRE

## 2025-02-06 DEVICE — THE INSPIRE® STIMULATION LEAD (MODEL 4063) IS DESIGNED TO DELIVER STIMULATION TO THE HYPOGLOSSAL NERVE FOR THE TREATMENT OF OBSTRUCTIVE SLEEP APNEA. THE LEAD FEATURES A FLEXIBLE, SELF-SIZING CUFF. ELECTRODES IN THE INNER SURFACE OF THE CUFF DELIVER STIMULATION TO THE NERVE. THE LEAD INCORPORATES A STANDARD CONNECTOR FOR COUPLING TO THE INSPIRE IMPLANTABLE PULSE GENERATOR (IPG).
Type: IMPLANTABLE DEVICE | Site: NECK | Status: FUNCTIONAL
Brand: INSPIRE

## 2025-02-06 RX ORDER — PROPOFOL 10 MG/ML
INJECTION, EMULSION INTRAVENOUS AS NEEDED
Status: DISCONTINUED | OUTPATIENT
Start: 2025-02-06 | End: 2025-02-06

## 2025-02-06 RX ORDER — CEFAZOLIN SODIUM 2 G/50ML
2000 SOLUTION INTRAVENOUS ONCE
Status: COMPLETED | OUTPATIENT
Start: 2025-02-06 | End: 2025-02-06

## 2025-02-06 RX ORDER — SUCCINYLCHOLINE/SOD CL,ISO/PF 100 MG/5ML
SYRINGE (ML) INTRAVENOUS AS NEEDED
Status: DISCONTINUED | OUTPATIENT
Start: 2025-02-06 | End: 2025-02-06

## 2025-02-06 RX ORDER — SODIUM CHLORIDE, SODIUM LACTATE, POTASSIUM CHLORIDE, CALCIUM CHLORIDE 600; 310; 30; 20 MG/100ML; MG/100ML; MG/100ML; MG/100ML
50 INJECTION, SOLUTION INTRAVENOUS CONTINUOUS
Status: DISCONTINUED | OUTPATIENT
Start: 2025-02-06 | End: 2025-02-06 | Stop reason: HOSPADM

## 2025-02-06 RX ORDER — ACETAMINOPHEN 325 MG/1
650 TABLET ORAL EVERY 6 HOURS PRN
Status: DISCONTINUED | OUTPATIENT
Start: 2025-02-06 | End: 2025-02-06 | Stop reason: HOSPADM

## 2025-02-06 RX ORDER — FENTANYL CITRATE/PF 50 MCG/ML
50 SYRINGE (ML) INJECTION
Refills: 0 | Status: DISCONTINUED | OUTPATIENT
Start: 2025-02-06 | End: 2025-02-06 | Stop reason: HOSPADM

## 2025-02-06 RX ORDER — DEXAMETHASONE SODIUM PHOSPHATE 10 MG/ML
INJECTION, SOLUTION INTRAMUSCULAR; INTRAVENOUS AS NEEDED
Status: DISCONTINUED | OUTPATIENT
Start: 2025-02-06 | End: 2025-02-06

## 2025-02-06 RX ORDER — MAGNESIUM HYDROXIDE 1200 MG/15ML
LIQUID ORAL AS NEEDED
Status: DISCONTINUED | OUTPATIENT
Start: 2025-02-06 | End: 2025-02-06 | Stop reason: HOSPADM

## 2025-02-06 RX ORDER — ONDANSETRON 2 MG/ML
INJECTION INTRAMUSCULAR; INTRAVENOUS AS NEEDED
Status: DISCONTINUED | OUTPATIENT
Start: 2025-02-06 | End: 2025-02-06

## 2025-02-06 RX ORDER — SODIUM CHLORIDE, SODIUM LACTATE, POTASSIUM CHLORIDE, CALCIUM CHLORIDE 600; 310; 30; 20 MG/100ML; MG/100ML; MG/100ML; MG/100ML
20 INJECTION, SOLUTION INTRAVENOUS CONTINUOUS
Status: CANCELLED | OUTPATIENT
Start: 2025-02-06

## 2025-02-06 RX ORDER — CEFAZOLIN SODIUM 2 G/50ML
SOLUTION INTRAVENOUS
Status: DISCONTINUED
Start: 2025-02-06 | End: 2025-02-06 | Stop reason: HOSPADM

## 2025-02-06 RX ORDER — FENTANYL CITRATE 50 UG/ML
INJECTION, SOLUTION INTRAMUSCULAR; INTRAVENOUS AS NEEDED
Status: DISCONTINUED | OUTPATIENT
Start: 2025-02-06 | End: 2025-02-06

## 2025-02-06 RX ORDER — OXYCODONE HYDROCHLORIDE 5 MG/1
5 TABLET ORAL EVERY 4 HOURS PRN
Qty: 10 TABLET | Refills: 0 | Status: SHIPPED | OUTPATIENT
Start: 2025-02-06 | End: 2025-02-16

## 2025-02-06 RX ORDER — OXYCODONE HCL 5 MG/5 ML
5 SOLUTION, ORAL ORAL EVERY 4 HOURS PRN
Refills: 0 | Status: DISCONTINUED | OUTPATIENT
Start: 2025-02-06 | End: 2025-02-06 | Stop reason: HOSPADM

## 2025-02-06 RX ORDER — MIDAZOLAM HYDROCHLORIDE 2 MG/2ML
INJECTION, SOLUTION INTRAMUSCULAR; INTRAVENOUS AS NEEDED
Status: DISCONTINUED | OUTPATIENT
Start: 2025-02-06 | End: 2025-02-06

## 2025-02-06 RX ORDER — LIDOCAINE HYDROCHLORIDE AND EPINEPHRINE 10; 10 MG/ML; UG/ML
INJECTION, SOLUTION INFILTRATION; PERINEURAL AS NEEDED
Status: DISCONTINUED | OUTPATIENT
Start: 2025-02-06 | End: 2025-02-06 | Stop reason: HOSPADM

## 2025-02-06 RX ORDER — EPHEDRINE SULFATE 50 MG/ML
INJECTION INTRAVENOUS AS NEEDED
Status: DISCONTINUED | OUTPATIENT
Start: 2025-02-06 | End: 2025-02-06

## 2025-02-06 RX ORDER — LIDOCAINE HYDROCHLORIDE 10 MG/ML
INJECTION, SOLUTION EPIDURAL; INFILTRATION; INTRACAUDAL; PERINEURAL AS NEEDED
Status: DISCONTINUED | OUTPATIENT
Start: 2025-02-06 | End: 2025-02-06

## 2025-02-06 RX ADMIN — EPHEDRINE SULFATE 5 MG: 50 INJECTION INTRAVENOUS at 14:14

## 2025-02-06 RX ADMIN — PROPOFOL 100 MCG/KG/MIN: 10 INJECTION, EMULSION INTRAVENOUS at 13:44

## 2025-02-06 RX ADMIN — Medication 100 MG: at 13:42

## 2025-02-06 RX ADMIN — PROPOFOL 150 MG: 10 INJECTION, EMULSION INTRAVENOUS at 13:43

## 2025-02-06 RX ADMIN — ONDANSETRON 4 MG: 2 INJECTION, SOLUTION INTRAMUSCULAR; INTRAVENOUS at 13:43

## 2025-02-06 RX ADMIN — DEXAMETHASONE SODIUM PHOSPHATE 10 MG: 10 INJECTION INTRAMUSCULAR; INTRAVENOUS at 13:43

## 2025-02-06 RX ADMIN — FENTANYL CITRATE 50 MCG: 50 INJECTION INTRAMUSCULAR; INTRAVENOUS at 13:43

## 2025-02-06 RX ADMIN — CEFAZOLIN SODIUM 2000 MG: 2 SOLUTION INTRAVENOUS at 13:36

## 2025-02-06 RX ADMIN — OXYCODONE HYDROCHLORIDE 5 MG: 5 SOLUTION ORAL at 17:14

## 2025-02-06 RX ADMIN — FENTANYL CITRATE 50 MCG: 50 INJECTION INTRAMUSCULAR; INTRAVENOUS at 16:17

## 2025-02-06 RX ADMIN — PHENYLEPHRINE HYDROCHLORIDE 50 MCG/MIN: 50 INJECTION INTRAVENOUS at 14:23

## 2025-02-06 RX ADMIN — LIDOCAINE HYDROCHLORIDE 50 MG: 10 INJECTION, SOLUTION EPIDURAL; INFILTRATION; INTRACAUDAL at 13:43

## 2025-02-06 RX ADMIN — MIDAZOLAM HYDROCHLORIDE 2 MG: 1 INJECTION, SOLUTION INTRAMUSCULAR; INTRAVENOUS at 13:36

## 2025-02-06 RX ADMIN — SODIUM CHLORIDE, SODIUM LACTATE, POTASSIUM CHLORIDE, AND CALCIUM CHLORIDE: .6; .31; .03; .02 INJECTION, SOLUTION INTRAVENOUS at 13:36

## 2025-02-06 RX ADMIN — FENTANYL CITRATE 50 MCG: 50 INJECTION INTRAMUSCULAR; INTRAVENOUS at 14:09

## 2025-02-06 RX ADMIN — SODIUM CHLORIDE 0.05 MCG/KG/MIN: 9 INJECTION, SOLUTION INTRAVENOUS at 13:44

## 2025-02-06 NOTE — OP NOTE
OPERATIVE REPORT  PATIENT NAME: Lydia Orellana    :  1960  MRN: [id number]  Pt Location: AN OR ROOM 03    SURGERY DATE: 2025    Surgeons and Role:     * Otis Malone MD - Primary     * Ana Luisa Santillan MD - Assisting    Preop Diagnosis:  Obstructive sleep apnea (adult) (pediatric) [G47.33]    Post-Op Diagnosis Codes:     * Obstructive sleep apnea (adult) (pediatric) [G47.33]    Procedure(s):  INSERTION UPPER AIRWAY STIMULATOR    Specimen(s):  * No specimens in log *    Estimated Blood Loss:   Minimal    Drains:  * No LDAs found *    Anesthesia Type:   General    Operative Indications:  Obstructive sleep apnea (adult) (pediatric) [G47.33]  BMI 26    Operative Findings:  Good stimulation at 0.5 V no retraction at 0.4 V      Complications:   None    Procedure and Technique:  Indications for procedure: Lydia Orellana is a 64 y.o. female with a history of Moderate to Severe obstructive sleep apnea, who is intolerant and unable to achieve benefit from positive pressure therapy. Patient has passed the clinical, polysomnographic, and endoscopic screening criteria and presents today for the implant, whom I have seen in consultation for the above-listed procedure. After discussion of risks, benefits, and alternatives the patient elected to undergo the procedure and informed consent was obtained.      Procedure in detail: The patient was brought back to the operating room laid in the supine position and general endotracheal anesthesia was administered.  The patient was positioned appropriately.  Appropriate time-out was taken and procedure, sidedness, and marking was confirmed.  5 mL of 1% lidocaine with 1:100,000 epinephrine was infiltrated into the marked areas. Prior to prepping and draping, electrodes were placed in the genioglossus and styloglossus muscle and connected to the NIM box for intraoperative nerve monitoring. The patient was prepped and draped in standard fashion.     Neuroplasty was performed as  follows: The lateral branches to retrusor muscles were identified, and tested intra-operatively using the NIM stimulator. The branches were identified and the inclusion branches were stimulated with both visual and neurostimulator confirmation. The branches were dissected in 360 degrees for 1.5 cm around the TV and C1 branches with care not to include the HG branches.      Insertion of an upper airway stimulator was performed as follows: The cuff electrode for the hypoglossal nerve stimulator was placed distally to these branches on the medial nerve branch to the genioglossus muscle. Diagnostic evaluation confirmed activation of the genioglossus nerve, resulting in genioglossal activation and tongue protrusion, confirmed visually.  The stimulation electrode was then looped under and secured to the digastric tendon on its lateral surface with the provided anchor.    Insertion of a thoracic sensor lead was performed as follows:  A second 5 cm incision was made in the right upper chest approximately 3 cm below the clavicle.  Dissection was carried down to the pectoralis muscle. An inferior pocket was created deep to the subcutaneous layer and superficial to the pectoralis muscle.   Dissection was carried down through pectoralis muscle using blunt dissection. The interspace between the 2nd and 3rd ribs were exposed. The external oblique muscles were identified, and bluntly dissected, and a tunnel was created between the external and internal intercostals in the 2nd intercostal space just on the superior aspect of the third rib. The pleural respiration sensor was placed into the pocket in the inferior aspect of the intercostal space.  The sensor was sutured to the fascia using the provided anchors to maintain the sensor facing the pleural space.   The stimulation lead was then tunneled in a subplatysmal plane and brought out into the sub-clavicular pocket.     Insertion of an upper airway stimulator was continued as  follows: Both the cuff electrode and the respiration sensing lead were connected to the implantable pulse generator.  Diagnostic evaluation was run, which confirmed a good respiration sensing signal as well as good tongue protrusion stimulation.      The implantable pulse generator was placed in the subclavicular pocket and secured loosely to the pectoralis fascia using 2-0 silk sutures.  All the wounds were thoroughly irrigated with irrigation.  The wounds were then closed in three layers with deep layers closed with 3-0 Vicryl and the skin closed with 4-0 Monocryl. Wound dressings were placed.     The patient was returned to the care of Anesthesia, extubated without difficulty, and taken to the recovery area in stable condition. All instruments and sponge counts were correct at the end of the procedure. I, Otis Malone MD, was present for and performed all key elements of the procedure.     I was present for the entire procedure.    Patient Disposition:  PACU  and extubated and stable             SIGNATURE: Otis Malone MD  DATE: February 6, 2025  TIME: 3:30 PM

## 2025-02-06 NOTE — INTERVAL H&P NOTE
H&P reviewed. After examining the patient I find no changes in the patients condition since the H&P had been written.    Vitals:    02/06/25 1014   BP: 114/61   Pulse: 55   Resp: 18   Temp: (!) 97 °F (36.1 °C)   SpO2: 97%     CTAB  RRR  Abd soft NTND

## 2025-02-06 NOTE — ANESTHESIA PREPROCEDURE EVALUATION
Procedure:  INSERTION UPPER AIRWAY STIMULATOR (Head)    Relevant Problems   CARDIO   (+) Hypercholesterolemia      ENDO   (+) Hyperparathyroidism (HCC)   (+) Parathyroid adenoma      GI/HEPATIC   (+) Dysphagia      NEURO/PSYCH   (+) Mixed anxiety and depressive disorder      PULMONARY   (+) FREIDA on CPAP      Behavioral Health   (+) ADHD      Oncology   (+) History of ductal carcinoma in situ (DCIS) of breast      Surgery/Wound/Pain   (+) Status post gastric bypass for obesity        Physical Exam    Airway    Mallampati score: II  TM Distance: >3 FB  Neck ROM: full     Dental   No notable dental hx     Cardiovascular  Cardiovascular exam normal    Pulmonary  Pulmonary exam normal     Other Findings  post-pubertal.      Anesthesia Plan  ASA Score- 2     Anesthesia Type- general with ASA Monitors.         Additional Monitors:     Airway Plan:            Plan Factors-Exercise tolerance (METS): >4 METS.    Chart reviewed. EKG reviewed.  Existing labs reviewed. Patient summary reviewed.    Patient is not a current smoker. Patient not instructed to abstain from smoking on day of procedure. Patient did not smoke on day of surgery.            Induction- intravenous.    Postoperative Plan- Plan for postoperative opioid use.     Perioperative Resuscitation Plan - Level 1 - Full Code.       Informed Consent- Anesthetic plan and risks discussed with patient and spouse.  I personally reviewed this patient with the CRNA. Discussed and agreed on the Anesthesia Plan with the CRNA..      NPO Status:  No vitals data found for the desired time range.        Lab Results   Component Value Date    HGBA1C 5.3 04/01/2024       Lab Results   Component Value Date    K 4.0 01/24/2025     01/24/2025    CO2 30 01/24/2025    BUN 19 01/24/2025    CREATININE 0.88 01/24/2025    GLUF 92 09/04/2024    CALCIUM 9.6 01/24/2025    AST 24 12/17/2024    ALT 21 12/17/2024    ALKPHOS 38 12/17/2024    EGFR 69 01/24/2025       Lab Results   Component  Value Date    WBC 6.96 01/24/2025    HGB 12.8 01/24/2025    HCT 40.2 01/24/2025    MCV 94 01/24/2025     01/24/2025     Sinus bradycardia  Normal ECG  When compared with ECG of 12-JUN-2013 12:37,  No significant change was found  Confirmed by Mitch Greene ([id number]) on 9/4/2024 5:02:16 PM     Specimen Collected: 09/04/24 08:08

## 2025-02-06 NOTE — DISCHARGE INSTR - AVS FIRST PAGE
Otis Malone M.D.  Re Hypoglossal Nerve Stimulator    Post-Operative Care  Office [phone]  Cell [phone]               At Home (in the days immediately following the procedure):   Try to sleep with your head elevated on 2-3 pillows   Iced packs placed over wound will help reduce swelling.  They should be used 20 minutes on/ 20 minutes off while awake for the first full day.  Crushed ice in ziplock bags or frozen peas or corn work well.    The dressings may be removed 1 day after surgery. There are small strips under the dressings. These should fall off on their own. If they do not, our office will remove them at follow up. If they fall off early, the wounds should be cleaned with a Q-tip soaked in hydrogen peroxide mixed 50/50 with water.  After removal apply antibiotic ointment (Bacitracin) or Vaseline to the external incisions 3-4 times per day.   Take your medicines as prescribed.  REMEMBER:  DO NOT DRIVE WHILE TAKING PAIN MEDICATIONS.   You should do neck rolls 10 times clockwise and 10 times counterclockwise directions for 1 week after surgery.    You may shower with luke-warm water only after the dressings are removed.    You may use ibuprofen (Motrin, Advil) and acetaminophen (Tyelnol) for pain control in addition to any prescribed pain medications.     You may get out of bed and go to the bathroom with assistance. Eat light, soft meals as tolerated, avoiding gas-stimulating foods.     Follow-up care:   Eat before coming to the office for post-operative visits.  Rest for the first week after the procedure, avoiding excessive physical activities, hard chewing, lifting objects over 8 lbs (about the weight of a phone book), or bending over. We request that you do not travel by plane for one week after surgery.     Follow-up visits:    At one week, any external sutures will be removed; you may drive yourself to this appointment (as long as you are no longer taking prescription/narcotic pain  medicines).  After dressing removal, make-up may be worn, avoiding the incision lines. Additional follow-up visits will be scheduled at this time.  Note that final results from the incisions may not be apparent until ONE YEAR after surgery.    Healing Care:   After surgery try not to roll onto the wound while asleep.  Clean the external skin gently but thoroughly with soap. The use of alcohol and tobacco products prolong swelling and healing and are best avoided for 2 weeks after surgery.   Do not expose your wound to sun for 4-6 weeks after surgery.  Use sunscreen (SPF 30 or higher) for 6 months after surgery if sun exposure is absolutely necessary.  Avoid any physical exercise that can cause over-heating or over-exertion for two weeks after the surgery.  Complete healing may take 12 months.  It is to your advantage to return for all postoperative visits so that long-term results may be evaluated.    Frequently Asked Questions:  When can I shower and shampoo my hair?   You may shower the day after your surgery, BUT KEEP ANY DRESSING DRY.  This may mean you wash your face/hair in the sink instead.  It is important that you do not use hot water, as this can increase the swelling.  Lukewarm water is best.      When will the swelling and bruising go away?   This usually takes 7-10 days or so, but may take less or more time, depending on the individual.    When can I take aspirin?   You should not take aspirin for 2 weeks prior to or after surgery. The same is true for vitamin E, ginko, garlic pills, and other “natural” supplements.    When can I take ibuprofen?      Non-steroidal anti-inflammatory drugs such as advil (ibuprofen), alleve (naproxen), or other similar may be used immediately after surgery as per the guidelines on the package.      When can I wear my glasses?   You will be able to wear contact lenses as soon as you feel comfortable.  You may wear glasses one to two weeks after surgery, depending on the  type of surgery you had.  In any case, you must be careful not to place any pressure on the wound.     When can I wear makeup?   Make-up can be applied after suture removal, but not directly on the incisions until 3 weeks after the procedure.    When will I activate my device?       We will wait for your healing to finish prior to activation which usually means a few weeks. The plan will be set up at your first follow up appointment at 1 week after the procedure.     What about exercise?   Please adhere to the following schedule:   Up to week 1 after surgery:  REST!  No strenuous exercise.  Walking is ok.  Week 1-3 after surgery:  You may begin light aerobic exercise, but no bending over/straining/lifting weights. You may begin some range of motion exercises of your shoulder.   Week 3+:  You may begin more strenuous exercise, such as yoga, stretching, bending over, lifting weights.  Please remember to start slowly.  Week 6+:  You may resume contact sports, such as soccer, basketball, etc    POST-OPERATIVE APPOINTMENTS:  1 week:  wound check in the office.   1 month: device activation and wound check in the office.  3 months: device titration sleep study at [facility].   4 months: final wound check in the office.   Yearly: device check at office.     How to contact us:    Phone:  If you have questions or concerns, please call us at [phone] during business hours (8 am to 5 pm).  On nights and weekends, you may page the ENT surgeon on call  at [facility].  In case of emergency, please call [phone].

## 2025-02-06 NOTE — ANESTHESIA POSTPROCEDURE EVALUATION
Post-Op Assessment Note    CV Status:  Stable  Pain Score: 2    Pain management: adequate       Mental Status:  Alert and awake   Hydration Status:  Euvolemic   PONV Controlled:  Controlled   Airway Patency:  Patent     Post Op Vitals Reviewed: Yes    No anethesia notable event occurred.    Staff: Anesthesiologist           Last Filed PACU Vitals:  Vitals Value Taken Time   Temp 97.7 °F (36.5 °C) 02/06/25 1645   Pulse 70 02/06/25 1653   /79 02/06/25 1645   Resp 16 02/06/25 1645   SpO2 98 % 02/06/25 1652   Vitals shown include unfiled device data.    Modified Benjie:     Vitals Value Taken Time   Activity 2 02/06/25 1645   Respiration 2 02/06/25 1645   Circulation 2 02/06/25 1645   Consciousness 2 02/06/25 1645   Oxygen Saturation 2 02/06/25 1645     Modified Benjie Score: 10

## 2025-02-06 NOTE — H&P
Lydia Orellana is a 64 y.o.female who presents for re-evaluation of FREIDA. HST performed on 6/26/24 demonstrated an AHI of 30. Has been on CPAP for 25 years, unable to tolerate due to mask/pressure and irritation to nose. She has recently switched to dental appliance. Previous AHI approx 80. She did have a 70 lb weight loss but did have subsequent sleep study that continued to show sleep apnea. No nasal obstruction. No previous nasal surgery.    DISE showed no evidence of complete concentric collapse and that she is a candidate for Inspire     Past Medical History:   Diagnosis Date    ADHD     Anxiety     Arthritis     Asthma     Breast cancer (HCC) 01/01/2012    Cancer ([facility]) 08/2012    DCIS-right breast    Closed fracture of radial styloid     Endometriosis     Family history of colon cancer in mother     Forceps delivery     1991 daughter    GERD (gastroesophageal reflux disease)     H/O mitral valve prolapse     no regurgitation    Hiatal hernia     History of radiation therapy     Hyperlipidemia     Infertility, female     Normal delivery     1998 daughter    Sleep apnea     on cpap    TB lung, latent     treated with INH (in her 30's)       /61   Pulse 55   Temp (!) 97 °F (36.1 °C) (Temporal)   Resp 18   Ht 5' (1.524 m)   Wt 62.6 kg (138 lb)   SpO2 97%   BMI 26.95 kg/m²       Physical Exam   Constitutional: Oriented to person, place, and time. Well-developed and well-nourished, no apparent distress, non-toxic appearance. Cooperative, able to hear and answer questions without difficulty.    Voice: Normal voice quality.  Head: Normocephalic, atraumatic.  No scars, masses or lesions.  Face: Symmetric, no edema, no sinus tenderness.  Eyes: Vision grossly intact, extra-ocular movement intact.  Ears: External ear normal.  Bilateral tympanic membranes are intact with intact normal landmarks. No post-auricular erythema or tenderness.  Nose: Septum midline, nares clear.  Mucosa moist, turbinates well appearing.   No crusting, polyps or discharge evident.  Oral cavity: Dentition intact.  Mucosa moist, lips normal.  Tongue mobile, floor of mouth normal.  Hard palate unremarkable.  No masses or lesions.   Oropharynx: Uvula is midline, soft palate normal.  Unremarkable oropharyngeal inlet.  Tonsils unremarkable.  Posterior pharyngeal wall clear. No masses or lesions.  Salivary glands:  Parotid glands and submandibular glands symmetric, no enlargement or tenderness.  Neck: Normal laryngeal elevation with swallow.  Trachea midline.  No masses or lesions. No palpable adenopathy.  Thyroid: normal in size, unremarkable without tenderness or palpable nodules.  Pulmonary/Chest: Normal effort and rate. No respiratory distress.   Musculoskeletal: Normal range of motion.   Neurological: Cranial nerves 2-12 intact.  Skin: Skin is warm and dry.   Psychiatric: Normal mood and affect.        A/P: Obstructive sleep apnea: We discussed the nature of obstructive sleep apnea.  We discussed the natural history of sleep apnea. We discussed options for management. We discussed non-surgical management including weight loss, mandibular advancement devices, and positive airway pressure therapy including various options. We discussed that she is not tolerating her CPAP and has at least moderate FREIDA with an AHI of 30.0 and BMI of 26.6, she would like to move forward with Inspire hypoglossal nerve stimulator. Risks, benefits, and alternatives were discussed. Will seek her insurance approval and have her return in follow up for any further discussion.

## 2025-02-06 NOTE — ANESTHESIA POSTPROCEDURE EVALUATION
Post-Op Assessment Note    CV Status:  Stable  Pain Score: 0    Pain management: adequate       Mental Status:  Arousable   Hydration Status:  Euvolemic   PONV Controlled:  Controlled   Airway Patency:  Patent  Airway: intubated     Post Op Vitals Reviewed: Yes    No anethesia notable event occurred.    Staff: CRNA           Last Filed PACU Vitals:  Vitals Value Taken Time   Temp 97.2    Pulse 70 02/06/25 1540   /72    Resp 17    SpO2 96 % 02/06/25 1540   Vitals shown include unfiled device data.

## 2025-03-07 DIAGNOSIS — M81.8 OTHER OSTEOPOROSIS WITHOUT CURRENT PATHOLOGICAL FRACTURE: ICD-10-CM

## 2025-03-07 RX ORDER — ALENDRONATE SODIUM 70 MG/1
70 TABLET ORAL
Qty: 12 TABLET | Refills: 0 | Status: SHIPPED | OUTPATIENT
Start: 2025-03-07

## 2025-03-18 ENCOUNTER — APPOINTMENT (OUTPATIENT)
Dept: LAB | Age: 65
End: 2025-03-18

## 2025-03-18 DIAGNOSIS — Z00.8 HEALTH EXAMINATION IN POPULATION SURVEY: ICD-10-CM

## 2025-03-18 LAB
CHOLEST SERPL-MCNC: 208 MG/DL (ref ?–200)
EST. AVERAGE GLUCOSE BLD GHB EST-MCNC: 111 MG/DL
HBA1C MFR BLD: 5.5 %
HDLC SERPL-MCNC: 69 MG/DL
LDLC SERPL CALC-MCNC: 121 MG/DL (ref 0–100)
NONHDLC SERPL-MCNC: 139 MG/DL
TRIGL SERPL-MCNC: 90 MG/DL (ref ?–150)

## 2025-03-18 PROCEDURE — 36415 COLL VENOUS BLD VENIPUNCTURE: CPT

## 2025-03-18 PROCEDURE — 83036 HEMOGLOBIN GLYCOSYLATED A1C: CPT

## 2025-03-18 PROCEDURE — 80061 LIPID PANEL: CPT

## 2025-04-03 ENCOUNTER — OFFICE VISIT (OUTPATIENT)
Dept: FAMILY MEDICINE CLINIC | Facility: CLINIC | Age: 65
End: 2025-04-03
Payer: COMMERCIAL

## 2025-04-03 VITALS
SYSTOLIC BLOOD PRESSURE: 100 MMHG | WEIGHT: 142 LBS | HEIGHT: 60 IN | HEART RATE: 58 BPM | TEMPERATURE: 97.3 F | RESPIRATION RATE: 16 BRPM | BODY MASS INDEX: 27.88 KG/M2 | DIASTOLIC BLOOD PRESSURE: 60 MMHG | OXYGEN SATURATION: 97 %

## 2025-04-03 DIAGNOSIS — E78.00 ELEVATED LDL CHOLESTEROL LEVEL: ICD-10-CM

## 2025-04-03 DIAGNOSIS — J06.0 LARYNGOPHARYNGITIS ACUTE: ICD-10-CM

## 2025-04-03 DIAGNOSIS — F41.8 MIXED ANXIETY AND DEPRESSIVE DISORDER: ICD-10-CM

## 2025-04-03 DIAGNOSIS — F90.9 ATTENTION DEFICIT HYPERACTIVITY DISORDER (ADHD), UNSPECIFIED ADHD TYPE: Primary | ICD-10-CM

## 2025-04-03 DIAGNOSIS — H65.21 SIMPLE CHRONIC SEROUS OTITIS MEDIA, RIGHT: ICD-10-CM

## 2025-04-03 PROCEDURE — 99214 OFFICE O/P EST MOD 30 MIN: CPT | Performed by: FAMILY MEDICINE

## 2025-04-03 RX ORDER — SERTRALINE HYDROCHLORIDE 100 MG/1
100 TABLET, FILM COATED ORAL
Qty: 90 TABLET | Refills: 2 | Status: SHIPPED | OUTPATIENT
Start: 2025-04-03

## 2025-04-03 RX ORDER — FLUTICASONE PROPIONATE 50 MCG
2 SPRAY, SUSPENSION (ML) NASAL DAILY
Qty: 18.2 ML | Refills: 1 | Status: SHIPPED | OUTPATIENT
Start: 2025-04-03

## 2025-04-03 RX ORDER — AZITHROMYCIN 250 MG/1
TABLET, FILM COATED ORAL
Qty: 6 TABLET | Refills: 0 | Status: SHIPPED | OUTPATIENT
Start: 2025-04-03 | End: 2025-04-08

## 2025-04-03 RX ORDER — DEXTROAMPHETAMINE SACCHARATE, AMPHETAMINE ASPARTATE, DEXTROAMPHETAMINE SULFATE AND AMPHETAMINE SULFATE 5; 5; 5; 5 MG/1; MG/1; MG/1; MG/1
20 TABLET ORAL DAILY
Qty: 90 TABLET | Refills: 0 | Status: SHIPPED | OUTPATIENT
Start: 2025-04-03

## 2025-04-03 NOTE — PROGRESS NOTES
Name: Lydia Orellana      : 1960     MRN: [id number]  Encounter Provider: Chayo Sales MD  Encounter Date: 4/3/2025  Encounter department: [facility] MEDICINE    Assessment & Plan  Attention deficit hyperactivity disorder (ADHD), unspecified ADHD type    Orders:    amphetamine-dextroamphetamine (ADDERALL) 20 mg tablet; Take 1 tablet (20 mg total) by mouth daily Max Daily Amount: 20 mg    Mixed anxiety and depressive disorder      Orders:    sertraline (ZOLOFT) 100 mg tablet; Take 1 tablet (100 mg total) by mouth daily at bedtime    Simple chronic serous otitis media, right    Orders:    fluticasone (FLONASE) 50 mcg/act nasal spray; 2 sprays into each nostril daily    Laryngopharyngitis acute  Discussed this is likely viral however given the chronicity of the symptoms recommend azithromycin for 5 days.  Orders:    azithromycin (Zithromax) 250 mg tablet; Take 2 tablets (500 mg total) by mouth daily for 1 day, THEN 1 tablet (250 mg total) daily for 4 days.    Elevated LDL cholesterol level  Reviewed and discussed lipid panel improved from last year.  Continue low-fat diet, cut back on cheese and red meats.  Incorporate nuts in the diet and continue multivitamins on a regular basis                      Read package inserts for all medications before starting a new medications, call me if you have any questions.    Patient was given opportunity to ask questions and all questions were answered.    Subjective:     Lydia Orellana is a 64 y.o. female.      Hyperlipidemia: following low fat diet  Depression and anxiety- controlled, on zoloft 100 mg qd  ADHD-   Pt takes adderal 20 mg qd and recently has days when she has to take an extra in afternoon, she does take drug holiday  Obstructive sleep apnea- follows sleep medicine, s/p Inspire procedure in 2025, had 1 week of right ear pain which has resolved  Parathyroid lesion -3 mm, saw endocrine, monitoring PTH, VIT D and calcium  Osteoporosis-  started on weekly alendronate, denies any side effects, taking calcium and vit d supplements, needs refils  History of gastric bypass- off multivitamin supplements due to recent surgery, plans on resuming  No CP/sob. No headaches.No GI upset or insomnia. No palpitations.     For 2 weeks she has voice loss, productive phlegm, cough, sinus pressure and congestion        Past Medical History:   Diagnosis Date    ADHD     Anxiety     Arthritis     Asthma     Breast cancer (HCC) 01/01/2012    Breast cyst     Cancer (HCC) 08/2012    DCIS-right breast    Closed fracture of radial styloid     Endometriosis     Family history of colon cancer in mother     Forceps delivery     1991 daughter    GERD (gastroesophageal reflux disease)     H/O mitral valve prolapse     no regurgitation    Hiatal hernia     History of radiation therapy     Hyperlipidemia     Infertility, female     Normal delivery     1998 daughter    Sleep apnea     on cpap    TB lung, latent     treated with INH (in her 30's)       Family History   Problem Relation Age of Onset    Colon cancer Mother 50    Cancer Mother         colon    Osteoporosis Mother     Stroke Father     Hypertension Father     Breast cancer Sister     Cancer Sister 71        spinal cancer    Kidney cancer Sister 70    No Known Problems Daughter     No Known Problems Daughter     No Known Problems Maternal Grandmother     Colon cancer Maternal Grandfather 80    Cancer Maternal Grandfather         colon    Hypertension Paternal Grandmother     Stroke Paternal Grandmother     No Known Problems Paternal Grandfather     No Known Problems Paternal Aunt     Hypertension Family     Mitral valve prolapse Family     Osteoporosis Family     Varicose Veins Family     Melanoma Nephew        Past Surgical History:   Procedure Laterality Date    BREAST BIOPSY Right 04/01/2012    biopsy breast percutaneous needle core    BREAST LUMPECTOMY Right 08/01/2012    CHOLECYSTECTOMY  01/23/2013    COLONOSCOPY       DILATION AND CURETTAGE OF UTERUS      ENDOMETRIAL BIOPSY      without cervical dilation    EXAMINATION UNDER ANESTHESIA N/A 9/23/2024    Procedure: DRUG INDUCED SLEEP ENDOSCOPY;  Surgeon: Otis Malone MD;  Location: [facility];  Service: ENT    KNEE ARTHROSCOPY      Plica syndrome    LAPAROSCOPY      Exploratory 1990 & 1994    DC OPEN IMPLTJ HPGLSL NRV NSTIM RA PG&RESPIR SENSOR N/A 2/6/2025    Procedure: INSERTION UPPER AIRWAY STIMULATOR;  Surgeon: Otis Malone MD;  Location: [facility];  Service: ENT    SALUD-EN-Y PROCEDURE  01/23/2013    Dr Crawford    UPPER GASTROINTESTINAL ENDOSCOPY      US GUIDANCE  5/14/2013        reports that she has never smoked. She has never used smokeless tobacco. She reports current alcohol use. She reports that she does not use drugs.      Current Outpatient Medications:     alendronate (FOSAMAX) 70 mg tablet, Take 1 tablet (70 mg total) by mouth every 7 days Take once every week on empty stomach with water and stay upright for 1/2 hour after taking the medication, Disp: 12 tablet, Rfl: 0    amphetamine-dextroamphetamine (ADDERALL) 20 mg tablet, Take 1 tablet (20 mg total) by mouth daily Max Daily Amount: 20 mg, Disp: 90 tablet, Rfl: 0    amphetamine-dextroamphetamine (ADDERALL, 10MG,) 10 mg tablet, Take 1 tablet (10 mg total) by mouth daily as needed (at noon) Max Daily Amount: 10 mg, Disp: 90 tablet, Rfl: 0    azithromycin (Zithromax) 250 mg tablet, Take 2 tablets (500 mg total) by mouth daily for 1 day, THEN 1 tablet (250 mg total) daily for 4 days., Disp: 6 tablet, Rfl: 0    fluticasone (FLONASE) 50 mcg/act nasal spray, 2 sprays into each nostril daily, Disp: 18.2 mL, Rfl: 1    sertraline (ZOLOFT) 100 mg tablet, Take 1 tablet (100 mg total) by mouth daily at bedtime, Disp: 90 tablet, Rfl: 2    calcium carbonate (OS-IVONE) 600 MG tablet, Take 600 mg by mouth daily (Patient not taking: Reported on 4/3/2025), Disp: , Rfl:     Cholecalciferol (VITAMIN D) 2000 units CAPS, Take  by mouth Take 5000IU daily (Patient not taking: Reported on 2/3/2025), Disp: , Rfl:     Multiple Vitamin (MULTI-VITAMIN DAILY) TABS, Take by mouth (Patient not taking: Reported on 4/3/2025), Disp: , Rfl:     omega-3-acid ethyl esters (LOVAZA) 1 g capsule, Take 2 g by mouth 2 (two) times a day (Patient not taking: Reported on 4/3/2025), Disp: , Rfl:     The following portions of the patient's history were reviewed and updated as appropriate: allergies, current medications, past family history, past medical history, past social history, past surgical history and problem list.    Review of Systems   Constitutional:  Negative for fatigue and fever.   HENT:  Positive for congestion, rhinorrhea and sinus pressure. Negative for facial swelling, mouth sores, sneezing, sore throat and trouble swallowing.    Eyes:  Negative for pain and redness.   Respiratory:  Positive for cough. Negative for shortness of breath and wheezing.    Cardiovascular:  Negative for chest pain, palpitations and leg swelling.   Gastrointestinal:  Negative for abdominal pain, blood in stool, constipation, diarrhea and nausea.   Genitourinary:  Negative for dysuria, hematuria and urgency.   Musculoskeletal:  Negative for arthralgias, back pain and myalgias.   Skin:  Negative for rash and wound.   Neurological:  Negative for seizures, syncope and headaches.   Hematological:  Negative for adenopathy.   Psychiatric/Behavioral:  Negative for agitation and behavioral problems.          PHQ-2/9 Depression Screening    Little interest or pleasure in doing things: 0 - not at all  Feeling down, depressed, or hopeless: 0 - not at all  Trouble falling or staying asleep, or sleeping too much: 0 - not at all  Feeling tired or having little energy: 0 - not at all  Poor appetite or overeatin - not at all  Feeling bad about yourself - or that you are a failure or have let yourself or your family down: 0 - not at all  Trouble concentrating on things, such as  reading the newspaper or watching television: 0 - not at all  Moving or speaking so slowly that other people could have noticed. Or the opposite - being so fidgety or restless that you have been moving around a lot more than usual: 0 - not at all  Thoughts that you would be better off dead, or of hurting yourself in some way: 0 - not at all  PHQ-9 Score: 0  PHQ-9 Interpretation: No or Minimal depression             Objective:    /60 (BP Location: Left arm, Patient Position: Sitting, Cuff Size: Adult)   Pulse 58   Temp (!) 97.3 °F (36.3 °C) (Tympanic)   Resp 16   Ht 5' (1.524 m)   Wt 64.4 kg (142 lb)   SpO2 97%   BMI 27.73 kg/m²      Physical Exam  Vitals and nursing note reviewed.   Constitutional:       Appearance: Normal appearance. She is well-developed. She is not ill-appearing.   HENT:      Head: Normocephalic and atraumatic.      Right Ear: External ear normal.      Left Ear: External ear normal.      Nose: Nose normal.      Mouth/Throat:      Mouth: Mucous membranes are moist.      Pharynx: No oropharyngeal exudate or posterior oropharyngeal erythema.   Eyes:      General: No scleral icterus.        Right eye: No discharge.         Left eye: No discharge.      Conjunctiva/sclera: Conjunctivae normal.   Cardiovascular:      Rate and Rhythm: Normal rate.      Heart sounds: No murmur heard.     No gallop.   Pulmonary:      Effort: Pulmonary effort is normal. No respiratory distress.      Breath sounds: No stridor. Rales (left lower lobe) present. No wheezing or rhonchi.   Chest:       Abdominal:      Palpations: Abdomen is soft.      Tenderness: There is no abdominal tenderness.   Musculoskeletal:         General: No tenderness or deformity.      Right lower leg: No edema.      Left lower leg: No edema.   Skin:     Findings: No erythema or rash.   Neurological:      Mental Status: She is alert. Mental status is at baseline.   Psychiatric:         Behavior: Behavior normal.         Judgment: Judgment  normal.           Recent Results (from the past 52 weeks)   CBC and differential    Collection Time: 09/04/24  8:03 AM   Result Value Ref Range    WBC 6.17 4.31 - 10.16 Thousand/uL    RBC 4.34 3.81 - 5.12 Million/uL    Hemoglobin 13.0 11.5 - 15.4 g/dL    Hematocrit 40.3 34.8 - 46.1 %    MCV 93 82 - 98 fL    MCH 30.0 26.8 - 34.3 pg    MCHC 32.3 31.4 - 37.4 g/dL    RDW 13.0 11.6 - 15.1 %    MPV 10.4 8.9 - 12.7 fL    Platelets 193 149 - 390 Thousands/uL    nRBC 0 /100 WBCs    Segmented % 56 43 - 75 %    Immature Grans % 0 0 - 2 %    Lymphocytes % 35 14 - 44 %    Monocytes % 8 4 - 12 %    Eosinophils Relative 1 0 - 6 %    Basophils Relative 0 0 - 1 %    Absolute Neutrophils 3.44 1.85 - 7.62 Thousands/µL    Absolute Immature Grans 0.02 0.00 - 0.20 Thousand/uL    Absolute Lymphocytes 2.16 0.60 - 4.47 Thousands/µL    Absolute Monocytes 0.48 0.17 - 1.22 Thousand/µL    Eosinophils Absolute 0.05 0.00 - 0.61 Thousand/µL    Basophils Absolute 0.02 0.00 - 0.10 Thousands/µL   Basic metabolic panel    Collection Time: 09/04/24  8:03 AM   Result Value Ref Range    Sodium 140 135 - 147 mmol/L    Potassium 4.0 3.5 - 5.3 mmol/L    Chloride 105 96 - 108 mmol/L    CO2 27 21 - 32 mmol/L    ANION GAP 8 4 - 13 mmol/L    BUN 17 5 - 25 mg/dL    Creatinine 0.74 0.60 - 1.30 mg/dL    Glucose, Fasting 92 65 - 99 mg/dL    Calcium 9.0 8.4 - 10.2 mg/dL    eGFR 85 ml/min/1.73sq m   ECG 12 lead    Collection Time: 09/04/24  8:08 AM   Result Value Ref Range    Ventricular Rate 54 BPM    Atrial Rate 54 BPM    FL Interval 176 ms    QRSD Interval 74 ms    QT Interval 458 ms    QTC Interval 434 ms    P Sherman 48 degrees    QRS Axis 77 degrees    T Wave Axis 84 degrees   Phosphorus    Collection Time: 12/17/24 11:10 AM   Result Value Ref Range    Phosphorus 2.7 2.3 - 4.1 mg/dL   Vitamin D 25 hydroxy    Collection Time: 12/17/24 11:10 AM   Result Value Ref Range    Vit D, 25-Hydroxy 95.5 30.0 - 100.0 ng/mL   PTH, intact    Collection Time: 12/17/24 11:10 AM    Result Value Ref Range    PTH 56.6 12.0 - 88.0 pg/mL   Comprehensive metabolic panel    Collection Time: 12/17/24 11:10 AM   Result Value Ref Range    Sodium 138 135 - 147 mmol/L    Potassium 3.7 3.5 - 5.3 mmol/L    Chloride 103 96 - 108 mmol/L    CO2 31 21 - 32 mmol/L    ANION GAP 4 4 - 13 mmol/L    BUN 18 5 - 25 mg/dL    Creatinine 0.64 0.60 - 1.30 mg/dL    Glucose 92 65 - 140 mg/dL    Calcium 8.9 8.4 - 10.2 mg/dL    AST 24 13 - 39 U/L    ALT 21 7 - 52 U/L    Alkaline Phosphatase 38 34 - 104 U/L    Total Protein 6.1 (L) 6.4 - 8.4 g/dL    Albumin 4.1 3.5 - 5.0 g/dL    Total Bilirubin 0.51 0.20 - 1.00 mg/dL    eGFR 93 ml/min/1.73sq m   Basic metabolic panel    Collection Time: 01/24/25  7:52 AM   Result Value Ref Range    Sodium 139 135 - 147 mmol/L    Potassium 4.0 3.5 - 5.3 mmol/L    Chloride 104 96 - 108 mmol/L    CO2 30 21 - 32 mmol/L    ANION GAP 5 4 - 13 mmol/L    BUN 19 5 - 25 mg/dL    Creatinine 0.88 0.60 - 1.30 mg/dL    Glucose 74 65 - 140 mg/dL    Calcium 9.6 8.4 - 10.2 mg/dL    eGFR 69 ml/min/1.73sq m   CBC and differential    Collection Time: 01/24/25  7:52 AM   Result Value Ref Range    WBC 6.96 4.31 - 10.16 Thousand/uL    RBC 4.30 3.81 - 5.12 Million/uL    Hemoglobin 12.8 11.5 - 15.4 g/dL    Hematocrit 40.2 34.8 - 46.1 %    MCV 94 82 - 98 fL    MCH 29.8 26.8 - 34.3 pg    MCHC 31.8 31.4 - 37.4 g/dL    RDW 13.1 11.6 - 15.1 %    MPV 10.3 8.9 - 12.7 fL    Platelets 206 149 - 390 Thousands/uL    nRBC 0 /100 WBCs    Segmented % 50 43 - 75 %    Immature Grans % 0 0 - 2 %    Lymphocytes % 40 14 - 44 %    Monocytes % 9 4 - 12 %    Eosinophils Relative 1 0 - 6 %    Basophils Relative 0 0 - 1 %    Absolute Neutrophils 3.49 1.85 - 7.62 Thousands/µL    Absolute Immature Grans 0.02 0.00 - 0.20 Thousand/uL    Absolute Lymphocytes 2.76 0.60 - 4.47 Thousands/µL    Absolute Monocytes 0.59 0.17 - 1.22 Thousand/µL    Eosinophils Absolute 0.07 0.00 - 0.61 Thousand/µL    Basophils Absolute 0.03 0.00 - 0.10 Thousands/µL    Hemoglobin A1C    Collection Time: 03/18/25  8:51 AM   Result Value Ref Range    Hemoglobin A1C 5.5 Normal 4.0-5.6%; PreDiabetic 5.7-6.4%; Diabetic >=6.5%; Glycemic control for adults with diabetes <7.0% %     mg/dl   Lipid panel    Collection Time: 03/18/25  8:51 AM   Result Value Ref Range    Cholesterol 208 (H) See Comment mg/dL    Triglycerides 90 See Comment mg/dL    HDL, Direct 69 >=50 mg/dL    LDL Calculated 121 (H) 0 - 100 mg/dL    Non-HDL-Chol (CHOL-HDL) 139 mg/dl       Laboratory Results: I have personally reviewed the pertinent laboratory results/reports     Radiology/Other Diagnostic Testing Results: I have reviewed the following imaging and agree with the interpretation below.    XR spine cervical 2 or 3 vw injury  Result Date: 2/11/2025  XR SPINE CERVICAL 2 OR 3 VW INJURY INDICATION: Inspire implant. COMPARISON: None FINDINGS: Inspire device lower anterior pulse generator not seen due to positioning. Intact wires, respiratory receptor and hypoglossal electrode. As visualized no broken nor abandoned wires or leads. No pneumothorax. Clear lung apices. No acute fracture or subluxation. Anatomic alignment. Mild degenerative changes. Normal prevertebral soft tissues.     Inspire device intact visualized wires and leads. Pulse generator not included. No pneumothorax. Workstation performed: [id number]     XR chest portable  Result Date: 2/6/2025  CHEST INDICATION:   Inspire implantation. COMPARISON: Parathyroid CT 8/18/2022, chest CT 6/12/2013. EXAM PERFORMED/VIEWS:  XR CHEST PORTABLE. FINDINGS:  Generator of Inspire upper airway stimulator over lower right hemithorax with hypoglossal sensing lead directed into the right neck. Respiration sensing lead over the upper right hemithorax. Cardiomediastinal silhouette normal. Lungs clear. No effusion or pneumothorax. Osseous structures normal for age.     No acute cardiopulmonary disease. Insertion of Inspire upper airway stimulator. Workstation performed:  "[id number]        Disclaimer: Portions of the record may have been created with voice recognition software. Occasional wrong word or \"sound a like\" substitutions may have occurred due to the inherent limitations of voice recognition software. Read the chart carefully and recognize, using context, where substitutions have occurred. I have used the Epic copy/forward function to compose this note. I have reviewed my current note to ensure it reflects the current patient status, exam, assessment and plan.  "

## 2025-04-03 NOTE — ASSESSMENT & PLAN NOTE
Orders:    sertraline (ZOLOFT) 100 mg tablet; Take 1 tablet (100 mg total) by mouth daily at bedtime

## 2025-04-03 NOTE — ASSESSMENT & PLAN NOTE
Orders:    amphetamine-dextroamphetamine (ADDERALL) 20 mg tablet; Take 1 tablet (20 mg total) by mouth daily Max Daily Amount: 20 mg

## 2025-05-22 DIAGNOSIS — M81.8 OTHER OSTEOPOROSIS WITHOUT CURRENT PATHOLOGICAL FRACTURE: ICD-10-CM

## 2025-05-22 RX ORDER — ALENDRONATE SODIUM 70 MG/1
70 TABLET ORAL
Qty: 12 TABLET | Refills: 0 | Status: SHIPPED | OUTPATIENT
Start: 2025-05-22

## 2025-06-27 ENCOUNTER — HOSPITAL ENCOUNTER (OUTPATIENT)
Dept: SLEEP CENTER | Facility: CLINIC | Age: 65
Discharge: HOME | End: 2025-06-27
Attending: INTERNAL MEDICINE
Payer: COMMERCIAL

## 2025-06-27 DIAGNOSIS — G47.33 OSA (OBSTRUCTIVE SLEEP APNEA): ICD-10-CM

## 2025-06-27 PROCEDURE — 95977 ALYS CPLX CN NPGT PRGRMG: CPT

## 2025-06-27 PROCEDURE — 95810 POLYSOM 6/> YRS 4/> PARAM: CPT

## 2025-06-28 NOTE — PROGRESS NOTES
Sleep Study Documentation    Pre-Sleep Study       Sleep testing procedure explained to patient:YES    Patient napped prior to study:NO    Caffeine:Dayshift worker after 12PM.  Caffeine use:NO    Alcohol:Dayshift workers after 5PM: Alcohol use:NO    Typical day for patient:YES         Study Documentation    Sleep Study Indications: Fine tuning of Inspire device.     Montage used: Standard    Transcutaneous CO2 used: NO    Sleep Study Type:     Diagnostic Sleep Study     Treatment: Mode of Therapy:Inspire   CPAP changed to BiPAP:No    Optimal Voltage: 1.4v laterally    Snore:Eliminated  Voltage at which snoring was eliminated 1.0v          Oxygen Data  Supplemental O2 used: No      EKG/EEG/Abnormal Behaviors   EKG abnormalities: no None    EEG abnormalities: no    Were abnormal behaviors in sleep observed:NO  No    Snoring    Character:  None    Frequency: Not present        Is Total Sleep Study Recording Time < 2 hours: N/A    Is Total Sleep Study Recording Time > 2 hours but study is incomplete: N/A    Is Total Sleep Study Recording Time 6 hours or more but sleep was not obtained: NO        Post-Sleep Study    Medication used at bedtime or during sleep study:YES other prescription medications    Patient reports time it took to fall asleep:20 to 30 minutes    Patient reports waking up during study:1 to 2 times.  Patient reports returning to sleep without difficulty.    Patient reports sleeping 6 to 8 hours without dreaming.    Does the Patient feel this is a typical night of sleep:typical    Patient rated sleepiness: Not sleepy or tired

## 2025-07-03 DIAGNOSIS — F41.8 MIXED ANXIETY AND DEPRESSIVE DISORDER: ICD-10-CM

## 2025-07-03 RX ORDER — SERTRALINE HYDROCHLORIDE 100 MG/1
100 TABLET, FILM COATED ORAL
Qty: 90 TABLET | Refills: 0 | OUTPATIENT
Start: 2025-07-03

## 2025-07-07 ENCOUNTER — HOSPITAL ENCOUNTER (OUTPATIENT)
Dept: RADIOLOGY | Age: 65
Discharge: HOME | End: 2025-07-07
Payer: COMMERCIAL

## 2025-07-07 VITALS — HEIGHT: 60 IN | WEIGHT: 143 LBS | BODY MASS INDEX: 28.07 KG/M2

## 2025-07-07 DIAGNOSIS — M81.8 OTHER OSTEOPOROSIS WITHOUT CURRENT PATHOLOGICAL FRACTURE: ICD-10-CM

## 2025-07-07 PROCEDURE — 77080 DXA BONE DENSITY AXIAL: CPT

## 2025-07-09 ENCOUNTER — TELEPHONE (OUTPATIENT)
Age: 65
End: 2025-07-09

## 2025-07-09 ENCOUNTER — OFFICE VISIT (OUTPATIENT)
Dept: FAMILY MEDICINE CLINIC | Facility: CLINIC | Age: 65
End: 2025-07-09
Payer: COMMERCIAL

## 2025-07-09 VITALS
HEART RATE: 84 BPM | BODY MASS INDEX: 28.47 KG/M2 | RESPIRATION RATE: 16 BRPM | OXYGEN SATURATION: 98 % | HEIGHT: 60 IN | DIASTOLIC BLOOD PRESSURE: 80 MMHG | SYSTOLIC BLOOD PRESSURE: 110 MMHG | WEIGHT: 145 LBS

## 2025-07-09 DIAGNOSIS — Z13.6 SCREENING FOR CARDIOVASCULAR CONDITION: ICD-10-CM

## 2025-07-09 DIAGNOSIS — Z13.29 THYROID DISORDER SCREENING: ICD-10-CM

## 2025-07-09 DIAGNOSIS — E66.3 OVERWEIGHT WITH BODY MASS INDEX (BMI) OF 28 TO 28.9 IN ADULT: ICD-10-CM

## 2025-07-09 DIAGNOSIS — G47.33 OSA (OBSTRUCTIVE SLEEP APNEA): ICD-10-CM

## 2025-07-09 DIAGNOSIS — F41.8 MIXED ANXIETY AND DEPRESSIVE DISORDER: ICD-10-CM

## 2025-07-09 DIAGNOSIS — Z13.1 SCREENING FOR DIABETES MELLITUS: ICD-10-CM

## 2025-07-09 DIAGNOSIS — J45.20 MILD INTERMITTENT ASTHMA WITHOUT COMPLICATION: ICD-10-CM

## 2025-07-09 DIAGNOSIS — F90.9 ATTENTION DEFICIT HYPERACTIVITY DISORDER (ADHD), UNSPECIFIED ADHD TYPE: ICD-10-CM

## 2025-07-09 DIAGNOSIS — Z00.01 ENCOUNTER FOR GENERAL ADULT MEDICAL EXAMINATION WITH ABNORMAL FINDINGS: Primary | ICD-10-CM

## 2025-07-09 PROCEDURE — 99214 OFFICE O/P EST MOD 30 MIN: CPT | Performed by: FAMILY MEDICINE

## 2025-07-09 PROCEDURE — 99397 PER PM REEVAL EST PAT 65+ YR: CPT | Performed by: FAMILY MEDICINE

## 2025-07-09 RX ORDER — DEXTROAMPHETAMINE SACCHARATE, AMPHETAMINE ASPARTATE, DEXTROAMPHETAMINE SULFATE, AND AMPHETAMINE SULFATE 2.5; 2.5; 2.5; 2.5 MG/1; MG/1; MG/1; MG/1
10 TABLET ORAL DAILY PRN
Qty: 90 TABLET | Refills: 0 | Status: SHIPPED | OUTPATIENT
Start: 2025-07-09

## 2025-07-09 RX ORDER — DEXTROAMPHETAMINE SACCHARATE, AMPHETAMINE ASPARTATE, DEXTROAMPHETAMINE SULFATE AND AMPHETAMINE SULFATE 5; 5; 5; 5 MG/1; MG/1; MG/1; MG/1
20 TABLET ORAL DAILY
Qty: 90 TABLET | Refills: 0 | Status: SHIPPED | OUTPATIENT
Start: 2025-07-09

## 2025-07-09 RX ORDER — SEMAGLUTIDE 0.5 MG/.5ML
0.5 INJECTION, SOLUTION SUBCUTANEOUS WEEKLY
Qty: 2 ML | Refills: 2 | Status: SHIPPED | OUTPATIENT
Start: 2025-06-30 | End: 2025-09-16

## 2025-07-09 RX ORDER — SEMAGLUTIDE 0.25 MG/.5ML
0.25 INJECTION, SOLUTION SUBCUTANEOUS WEEKLY
Qty: 2 ML | Refills: 0 | Status: SHIPPED | OUTPATIENT
Start: 2025-07-09 | End: 2025-07-31

## 2025-07-09 RX ORDER — SERTRALINE HYDROCHLORIDE 100 MG/1
100 TABLET, FILM COATED ORAL
Qty: 90 TABLET | Refills: 2 | Status: SHIPPED | OUTPATIENT
Start: 2025-07-09

## 2025-07-09 NOTE — TELEPHONE ENCOUNTER
PA for (Wegovy) 0.25 MG/0.5MLSUBMITTED to [facility]     via      [x]Surescripts-Case ID # [id number]       [x]PA sent as URGENT    All office notes, labs and other pertaining documents and studies sent. Clinical questions answered. Awaiting determination from insurance company.     Turnaround time for your insurance to make a decision on your Prior Authorization can take 7-21 business days.

## 2025-07-09 NOTE — ASSESSMENT & PLAN NOTE
Recommend Wegovy for weight loss  - Discussed options of HealthyCORE-Intensive Lifestyle Intervention Program, Very Low Calorie Diet-VLCD, Conservative Program, Ursula-En-Y Gastric Bypass, and Vertical Sleeve Gastrectomy and the role of weight loss medications.  Not interested in surgery  - Explained the importance of making lifestyle changes with anti-obesity medications.She has been doing strict lifestyle modification  - Patient is interested in pursuing Conservative Program    - Weight loss can improve patient's co-morbid conditions and/or prevent weight-related complications.  - sexually active, uses contraception  - Interested in weight loss medication to help with weight loss.   - FDA approved weight loss medications reviewed: Wegovy, Saxenda, Zepbound, Qsymia, Contrave, and Phentermine. Wegovy and Zepbound shortage discussed. Saxenda is being phased out. Off label use of medications discussed.     - Patient denies personal history of pancreatitis. Patient also denies personal and family history of medullary thyroid cancer and multiple endocrine neoplasia type 2 (MEN 2 tumor).   - I have discussed side effects of GLP-1 analogue(trulicity/ozempic/rebysus/mounjaro)- commonly including nausea, vomiting,diarrhea, or constipation,dyspepsia, tachycardia,abdominal pain,decreased appetite, UTIs,rare but more serious side effects- diabetic retinopathy, acute kidney injury, medullary thyroid cancer, thyroid c cell tumors, anaphylaxis, angioedema, chronic renal failure, pancreatitis, cholecystitis,cholelithiasis.  - She made an informed decision to start Wegovy.   - Start Wegovy 0.25mg subcutaneously weekly. After you have taken the second pen, please give me an update, as we will likely increase the dose the next month if you are tolerating it well.  - If you resting heart rate is greater than 100 beats per minutes, please notify me. If you develop severe abdominal pain, stop Wegovy and go to the emergency room, as that  could be a sign of pancreatitis.   - Please notify me if you have surgery, upper endoscopy, or colonoscopy scheduled, as we typically hold Wegovy for one week prior to the procedure.   - Wegovycan reduce the effectiveness of oral hormonal birth control (birth control pills). She was advised to use a barrier backup method such as condoms to prevent pregnancy for 4 weeks after starting the medication and for 4 weeks after each dose adjustment.            Goals:  Do not skip meals.  Food log (ie.) www.myfitnesspal.com,sparkpeople.com,loseit.com,calorieking.com,etc. baritastic (use skinnytaste.com, [employer] or smartphone haylee moneymeets for recipes)  No sugary beverages. At least 64oz of water daily.  Start food logging.  Exercise 150 min/week  1200 calories per day. Sample menu given.   Start Wegovy , no contraindications noted    Orders:    Semaglutide-Weight Management (Wegovy) 0.25 MG/0.5ML; Inject 0.5 mL (0.25 mg total) under the skin once a week for 4 doses Inject 0.25 mg under the skin weekly    Semaglutide-Weight Management (Wegovy) 0.5 MG/0.5ML; Inject 0.5 mL (0.5 mg total) under the skin once a week for 12 doses Inject 0.5 mg under the skin weekly

## 2025-07-09 NOTE — ASSESSMENT & PLAN NOTE
Orders:    amphetamine-dextroamphetamine (ADDERALL) 20 mg tablet; Take 1 tablet (20 mg total) by mouth daily Max Daily Amount: 20 mg    ADDERALL, 10MG, 10 MG tablet; Take 1 tablet (10 mg total) by mouth daily as needed (at noon) Max Daily Amount: 10 mg

## 2025-07-09 NOTE — PROGRESS NOTES
Adult Annual Physical  Name: Lydia Orellana      : 1960      MRN: [id number]  Encounter Provider: Chayo Sales MD  Encounter Date: 2025   Encounter department: [facility] MEDICINE    :  Assessment & Plan  Encounter for general adult medical examination with abnormal findings    Orders:    CBC and differential; Future    Comprehensive metabolic panel; Future    Hemoglobin A1C; Future    Lipid panel; Future    TSH, 3rd generation with Free T4 reflex; Future    Mixed anxiety and depressive disorder      Orders:    sertraline (ZOLOFT) 100 mg tablet; Take 1 tablet (100 mg total) by mouth daily at bedtime    Attention deficit hyperactivity disorder (ADHD), unspecified ADHD type    Orders:    amphetamine-dextroamphetamine (ADDERALL) 20 mg tablet; Take 1 tablet (20 mg total) by mouth daily Max Daily Amount: 20 mg    ADDERALL, 10MG, 10 MG tablet; Take 1 tablet (10 mg total) by mouth daily as needed (at noon) Max Daily Amount: 10 mg    Overweight with body mass index (BMI) of 28 to 28.9 in adult  Recommend Wegovy for weight loss  - Discussed options of HealthyCORE-Intensive Lifestyle Intervention Program, Very Low Calorie Diet-VLCD, Conservative Program, Ursula-En-Y Gastric Bypass, and Vertical Sleeve Gastrectomy and the role of weight loss medications.  Not interested in surgery  - Explained the importance of making lifestyle changes with anti-obesity medications.She has been doing strict lifestyle modification  - Patient is interested in pursuing Conservative Program    - Weight loss can improve patient's co-morbid conditions and/or prevent weight-related complications.  - sexually active, uses contraception  - Interested in weight loss medication to help with weight loss.   - FDA approved weight loss medications reviewed: Wegovy, Saxenda, Zepbound, Qsymia, Contrave, and Phentermine. Wegovy and Zepbound shortage discussed. Saxenda is being phased out. Off label use of medications discussed.      - Patient denies personal history of pancreatitis. Patient also denies personal and family history of medullary thyroid cancer and multiple endocrine neoplasia type 2 (MEN 2 tumor).   - I have discussed side effects of GLP-1 analogue(trulicity/ozempic/rebysus/mounjaro)- commonly including nausea, vomiting,diarrhea, or constipation,dyspepsia, tachycardia,abdominal pain,decreased appetite, UTIs,rare but more serious side effects- diabetic retinopathy, acute kidney injury, medullary thyroid cancer, thyroid c cell tumors, anaphylaxis, angioedema, chronic renal failure, pancreatitis, cholecystitis,cholelithiasis.  - She made an informed decision to start Wegovy.   - Start Wegovy 0.25mg subcutaneously weekly. After you have taken the second pen, please give me an update, as we will likely increase the dose the next month if you are tolerating it well.  - If you resting heart rate is greater than 100 beats per minutes, please notify me. If you develop severe abdominal pain, stop Wegovy and go to the emergency room, as that could be a sign of pancreatitis.   - Please notify me if you have surgery, upper endoscopy, or colonoscopy scheduled, as we typically hold Wegovy for one week prior to the procedure.   - Wegovycan reduce the effectiveness of oral hormonal birth control (birth control pills). She was advised to use a barrier backup method such as condoms to prevent pregnancy for 4 weeks after starting the medication and for 4 weeks after each dose adjustment.            Goals:  Do not skip meals.  Food log (ie.) www.Digiting.com,sparkpeople.com,loseit.com,calorieking.com,etc. baritastic (use skinnytaste.com, dietdoctor.com or smartphone haylee [employer] for recipes)  No sugary beverages. At least 64oz of water daily.  Start food logging.  Exercise 150 min/week  1200 calories per day. Sample menu given.   Start Wegovy , no contraindications noted    Orders:    Semaglutide-Weight Management (Wegovy) 0.25 MG/0.5ML;  Inject 0.5 mL (0.25 mg total) under the skin once a week for 4 doses Inject 0.25 mg under the skin weekly    Semaglutide-Weight Management (Wegovy) 0.5 MG/0.5ML; Inject 0.5 mL (0.5 mg total) under the skin once a week for 12 doses Inject 0.5 mg under the skin weekly    Mild intermittent asthma without complication         FREIDA (obstructive sleep apnea)         Screening for diabetes mellitus    Orders:    Hemoglobin A1C; Future    Screening for cardiovascular condition    Orders:    Lipid panel; Future    Thyroid disorder screening    Orders:    TSH, 3rd generation with Free T4 reflex; Future                   Preventive Screenings:    - Cervical cancer screening: screening not indicated   - Breast cancer screening: has history of breast cancer     Immunizations:  - Immunizations due: Prevnar 20         History of Present Illness     Adult Annual Physical:  Patient presents for annual physical. Overweight- s/p bariatric surgery, base weight 130 lbs, she has been on strict diet however continues to gain weight today 145 lbs, she does walk 30 min daily and exercises regularly.   Hyperlipidemia: following dietary instructions, trying to lose weight,baseline 130 lbs  Depression and anxiety- controlled on zoloft 100 mg qd,   ADHD-   Pt takes adderall XR 20 mg qd and adderall 10 mg  , needs name brand as she cannot take the generic due to decreased efficacy and poor symptom control  Obstructive sleep apnea- controlled, S/P INSPIRE procedure,   Parathyroid lesion -3 mm, saw endocrine, monitoring PTH, VIT d and calcium  Osteoporosis- started on weekly alendronate , denies any side effects, taking calcium and vit d supplements  History of gastric bypass- taking multivitamin supplements  .     Diet and Physical Activity:  - Diet/Nutrition: no special diet and adequate fiber intake.  - Exercise: walking, moderate cardiovascular exercise, 5-7 times a week on average and 30-60 minutes on average.    General Health:  - Sleep:  "sleeps well and > 8 hours of sleep on average. Inspire  - Hearing: normal hearing right ear and normal hearing left ear.  - Vision: most recent eye exam > 1 year ago and wears contacts.  - Dental: regular dental visits, brushes teeth twice daily and floss regularly.    /GYN Health:  - Follows with GYN: yes.   - Menopause: postmenopausal.   - Last menstrual cycle: 10/1/2000.   - History of STDs: no  - Contraception: menopause.      Advanced Care Planning:  - Has an advanced directive?: no    - Has a durable medical POA?: yes      Review of Systems   Constitutional:  Negative for fatigue and fever.   HENT:  Negative for congestion, facial swelling, mouth sores, rhinorrhea, sore throat and trouble swallowing.    Eyes:  Negative for pain and redness.   Respiratory:  Negative for cough, shortness of breath and wheezing.    Cardiovascular:  Negative for chest pain, palpitations and leg swelling.   Gastrointestinal:  Negative for abdominal pain, blood in stool, constipation, diarrhea and nausea.   Genitourinary:  Negative for dysuria, hematuria and urgency.   Musculoskeletal:  Negative for arthralgias, back pain and myalgias.   Skin:  Negative for rash and wound.   Neurological:  Negative for seizures, syncope and headaches.   Hematological:  Negative for adenopathy.   Psychiatric/Behavioral:  Negative for agitation and behavioral problems.      Medical History Reviewed by provider this encounter:  Tobacco  Allergies  Meds  Problems  Med Hx  Surg Hx  Fam Hx     .  Medications Ordered Prior to Encounter[1]   Social History[2]    Objective   /80 (BP Location: Right arm, Patient Position: Sitting, Cuff Size: Standard)   Pulse 84   Resp 16   Ht 4' 11.5\" (1.511 m)   Wt 65.8 kg (145 lb)   LMP 10/01/2000   SpO2 98%   BMI 28.80 kg/m²     Physical Exam  Vitals and nursing note reviewed.   Constitutional:       Appearance: Normal appearance. She is well-developed.   HENT:      Head: Normocephalic and " atraumatic.      Right Ear: Tympanic membrane, ear canal and external ear normal.      Left Ear: Tympanic membrane, ear canal and external ear normal.      Nose: Nose normal.      Mouth/Throat:      Pharynx: No oropharyngeal exudate.     Eyes:      General: No scleral icterus.        Right eye: No discharge.         Left eye: No discharge.      Conjunctiva/sclera: Conjunctivae normal.      Pupils: Pupils are equal, round, and reactive to light.     Neck:      Thyroid: No thyromegaly.     Cardiovascular:      Rate and Rhythm: Normal rate and regular rhythm.      Heart sounds: No murmur heard.     No gallop.   Pulmonary:      Effort: Pulmonary effort is normal. No respiratory distress.      Breath sounds: Normal breath sounds. No wheezing or rales.   Abdominal:      Palpations: Abdomen is soft.      Tenderness: There is no abdominal tenderness.     Musculoskeletal:         General: No tenderness or deformity.      Cervical back: Normal range of motion.      Right lower leg: No edema.      Left lower leg: No edema.   Lymphadenopathy:      Cervical: No cervical adenopathy.     Skin:     General: Skin is warm.      Capillary Refill: Capillary refill takes less than 2 seconds.      Findings: No erythema or rash.     Neurological:      Mental Status: She is alert and oriented to person, place, and time.      Deep Tendon Reflexes: Reflexes normal.     Psychiatric:         Behavior: Behavior normal.         Thought Content: Thought content normal.         Judgment: Judgment normal.              [1]   Current Outpatient Medications on File Prior to Visit   Medication Sig Dispense Refill    alendronate (FOSAMAX) 70 mg tablet Take 1 tablet (70 mg total) by mouth every 7 days Take once every week on empty stomach with water and stay upright for 1/2 hour after taking the medication 12 tablet 0    fluticasone (FLONASE) 50 mcg/act nasal spray 2 sprays into each nostril daily 18.2 mL 1    [DISCONTINUED] amphetamine-dextroamphetamine  (ADDERALL) 20 mg tablet Take 1 tablet (20 mg total) by mouth daily Max Daily Amount: 20 mg 90 tablet 0    [DISCONTINUED] amphetamine-dextroamphetamine (ADDERALL, 10MG,) 10 mg tablet Take 1 tablet (10 mg total) by mouth daily as needed (at noon) Max Daily Amount: 10 mg 90 tablet 0    [DISCONTINUED] calcium carbonate (OS-IVONE) 600 MG tablet Take 600 mg by mouth daily (Patient not taking: Reported on 4/3/2025)      [DISCONTINUED] Cholecalciferol (VITAMIN D) 2000 units CAPS Take by mouth Take 5000IU daily (Patient not taking: Reported on 2/3/2025)      [DISCONTINUED] Multiple Vitamin (MULTI-VITAMIN DAILY) TABS Take by mouth (Patient not taking: Reported on 4/3/2025)      [DISCONTINUED] omega-3-acid ethyl esters (LOVAZA) 1 g capsule Take 2 g by mouth 2 (two) times a day (Patient not taking: Reported on 4/3/2025)      [DISCONTINUED] sertraline (ZOLOFT) 100 mg tablet Take 1 tablet (100 mg total) by mouth daily at bedtime 90 tablet 2     No current facility-administered medications on file prior to visit.   [2]   Social History  Tobacco Use    Smoking status: Never    Smokeless tobacco: Never   Vaping Use    Vaping status: Never Used   Substance and Sexual Activity    Alcohol use: Yes     Comment: rarely    Drug use: Never    Sexual activity: Yes     Partners: Male     Birth control/protection: Post-menopausal

## 2025-07-09 NOTE — PATIENT INSTRUCTIONS
"Patient Education     Routine physical for adults   The Basics   Written by the doctors and editors at [facility]   What is a physical? -- A physical is a routine visit, or \"check-up,\" with your doctor. You might also hear it called a \"wellness visit\" or \"preventive visit.\"  During each visit, the doctor will:   Ask about your physical and mental health   Ask about your habits, behaviors, and lifestyle   Do an exam   Give you vaccines if needed   Talk to you about any medicines you take   Give advice about your health   Answer your questions  Getting regular check-ups is an important part of taking care of your health. It can help your doctor find and treat any problems you have. But it's also important for preventing health problems.  A routine physical is different from a \"sick visit.\" A sick visit is when you see a doctor because of a health concern or problem. Since physicals are scheduled ahead of time, you can think about what you want to ask the doctor.  How often should I get a physical? -- It depends on your age and health. In general, for people age 21 years and older:   If you are younger than 50 years, you might be able to get a physical every 3 years.   If you are 50 years or older, your doctor might recommend a physical every year.  If you have an ongoing health condition, like diabetes or high blood pressure, your doctor will probably want to see you more often.  What happens during a physical? -- In general, each visit will include:   Physical exam - The doctor or nurse will check your height, weight, heart rate, and blood pressure. They will also look at your eyes and ears. They will ask about how you are feeling and whether you have any symptoms that bother you.   Medicines - It's a good idea to bring a list of all the medicines you take to each doctor visit. Your doctor will talk to you about your medicines and answer any questions. Tell them if you are having any side effects that bother you. You " "should also tell them if you are having trouble paying for any of your medicines.   Habits and behaviors - This includes:   Your diet   Your exercise habits   Whether you smoke, drink alcohol, or use drugs   Whether you are sexually active   Whether you feel safe at home  Your doctor will talk to you about things you can do to improve your health and lower your risk of health problems. They will also offer help and support. For example, if you want to quit smoking, they can give you advice and might prescribe medicines. If you want to improve your diet or get more physical activity, they can help you with this, too.   Lab tests, if needed - The tests you get will depend on your age and situation. For example, your doctor might want to check your:   Cholesterol   Blood sugar   Iron level   Vaccines - The recommended vaccines will depend on your age, health, and what vaccines you already had. Vaccines are very important because they can prevent certain serious or deadly infections.   Discussion of screening - \"Screening\" means checking for diseases or other health problems before they cause symptoms. Your doctor can recommend screening based on your age, risk, and preferences. This might include tests to check for:   Cancer, such as breast, prostate, cervical, ovarian, colorectal, prostate, lung, or skin cancer   Sexually transmitted infections, such as chlamydia and gonorrhea   Mental health conditions like depression and anxiety  Your doctor will talk to you about the different types of screening tests. They can help you decide which screenings to have. They can also explain what the results might mean.   Answering questions - The physical is a good time to ask the doctor or nurse questions about your health. If needed, they can refer you to other doctors or specialists, too.  Adults older than 65 years often need other care, too. As you get older, your doctor will talk to you about:   How to prevent falling at " home   Hearing or vision tests   Memory testing   How to take your medicines safely   Making sure that you have the help and support you need at home  All topics are updated as new evidence becomes available and our peer review process is complete.  This topic retrieved from [employer] on: May 02, 2024.  Topic 765000 Version 1.0  Release: 32.4.3 - C32.122  © 2024 UpToDate, Inc. and/or its affiliates. All rights reserved.  Consumer Information Use and Disclaimer   Disclaimer: This generalized information is a limited summary of diagnosis, treatment, and/or medication information. It is not meant to be comprehensive and should be used as a tool to help the user understand and/or assess potential diagnostic and treatment options. It does NOT include all information about conditions, treatments, medications, side effects, or risks that may apply to a specific patient. It is not intended to be medical advice or a substitute for the medical advice, diagnosis, or treatment of a health care provider based on the health care provider's examination and assessment of a patient's specific and unique circumstances. Patients must speak with a health care provider for complete information about their health, medical questions, and treatment options, including any risks or benefits regarding use of medications. This information does not endorse any treatments or medications as safe, effective, or approved for treating a specific patient. UpToDate, Inc. and its affiliates disclaim any warranty or liability relating to this information or the use thereof.The use of this information is governed by the Terms of Use, available at https://www.[facility]ters[facility] Networkuwer.com/en/know/clinical-effectiveness-terms. 2024© UpToDate, Inc. and its affiliates and/or licensors. All rights reserved.  Copyright   © 2024 UpToDate, Inc. and/or its affiliates. All rights reserved.

## 2025-07-11 NOTE — TELEPHONE ENCOUNTER
PA for (Wegovy) 0.25 MG/0.5ML  APPROVED     Date(s) approved July 9, 2025 to July 9, 2026     Case #[id number]     Patient advised by          []MyChart Message  [x]Phone call   []LMOM  []L/M to call office as no active Communication consent on file  []Unable to leave detailed message as VM not approved on Communication consent       Pharmacy advised by    [x]Fax  []Phone call  []Secure Chat    Specialty Pharmacy    []     Approval letter scanned into Media Yes       PRE-OP DIAGNOSIS:  Chronic tonsillitis 09-Apr-2019 08:58:37  [place] Baker

## 2025-07-29 ENCOUNTER — PATIENT MESSAGE (OUTPATIENT)
Dept: FAMILY MEDICINE CLINIC | Facility: CLINIC | Age: 65
End: 2025-07-29

## 2025-08-06 DIAGNOSIS — E66.3 OVERWEIGHT WITH BODY MASS INDEX (BMI) OF 28 TO 28.9 IN ADULT: ICD-10-CM

## 2025-08-06 RX ORDER — SEMAGLUTIDE 0.5 MG/.5ML
0.5 INJECTION, SOLUTION SUBCUTANEOUS WEEKLY
Qty: 2 ML | Refills: 2 | Status: SHIPPED | OUTPATIENT
Start: 2025-08-06 | End: 2025-10-23

## (undated) DEVICE — REMOTE SLEEP INSPIRE

## (undated) DEVICE — 3M™ STERI-STRIP™ REINFORCED ADHESIVE SKIN CLOSURES, R1542, 1/4 IN X 1-1/2 IN (6 MM X 38 MM), 6 STRIPS/ENVELOPE: Brand: 3M™ STERI-STRIP™

## (undated) DEVICE — DRESSING MEPORE FILM ADHESIVE 4 X 5IN

## (undated) DEVICE — PACK UNIVERSAL NECK

## (undated) DEVICE — ELECTRODE 8227304 5PK PRASS PR 18MM ROHS

## (undated) DEVICE — VESSEL LOOPS X-RAY DETECTABLE: Brand: DEROYAL

## (undated) DEVICE — NEEDLE 18 G X 1 1/2 SAFETY

## (undated) DEVICE — SUT SILK PERMA-HAND 3-0 18IN A182H

## (undated) DEVICE — IV CATH 18 G X 1.16 IN

## (undated) DEVICE — BULB SYRINGE, IRRIGATION WITH PROTECTIVE CAP, 60 CC, INDIVIDUALLY WRAPPED: Brand: DOVER

## (undated) DEVICE — DISPOSABLE OR TOWEL: Brand: CARDINAL HEALTH

## (undated) DEVICE — GLOVE INDICATOR PI UNDERGLOVE SZ 7.5 BLUE

## (undated) DEVICE — 3M™ TEGADERM™ CHG DRESSING 25/CARTON 4 CARTONS/CASE 1659: Brand: TEGADERM™

## (undated) DEVICE — ALCOHOL PREP, 2 PLY, LARGE, MADE IN USA, SATURATED WITH 70% ISOPROPYL ALCOHOL, FOR EXTERNAL USE ONLY: Brand: WEBCOL

## (undated) DEVICE — 10FR FRAZIER SUCTION HANDLE: Brand: CARDINAL HEALTH

## (undated) DEVICE — KERLIX BANDAGE ROLL: Brand: KERLIX

## (undated) DEVICE — DECANTER: Brand: UNBRANDED

## (undated) DEVICE — TONGUE DEPRESSOR STERILE

## (undated) DEVICE — ELECTRODE BLADE MOD E-Z CLEAN  2.75IN 7CM -0012AM

## (undated) DEVICE — 3M™ IOBAN™ 2 ANTIMICROBIAL INCISE DRAPE 6650EZ: Brand: IOBAN™ 2

## (undated) DEVICE — SYRINGE 10ML LL

## (undated) DEVICE — INTENDED FOR TISSUE SEPARATION, AND OTHER PROCEDURES THAT REQUIRE A SHARP SURGICAL BLADE TO PUNCTURE OR CUT.: Brand: BARD-PARKER SAFETY BLADES SIZE 15, STERILE

## (undated) DEVICE — GAUZE SPONGES,16 PLY: Brand: CURITY

## (undated) DEVICE — PROBE 8225401 5PK SD-SD BIPOL STIM ROHS

## (undated) DEVICE — SUT SILK 2-0 SH 30 IN K833H

## (undated) DEVICE — DISPOSABLE EQUIPMENT COVER: Brand: SMALL TOWEL DRAPE

## (undated) DEVICE — WET SKIN PREP TRAY: Brand: MEDLINE INDUSTRIES, INC.

## (undated) DEVICE — 3M™ STERI-STRIP™ REINFORCED ADHESIVE SKIN CLOSURES, R1547, 1/2 IN X 4 IN (12 MM X 100 MM), 6 STRIPS/ENVELOPE: Brand: 3M™ STERI-STRIP™

## (undated) DEVICE — TIBURON SPLIT SHEET: Brand: CONVERTORS

## (undated) DEVICE — SUT MONOCRYL 4-0 PS-2 27 IN Y426H

## (undated) DEVICE — 3M™ STERI-STRIP™ COMPOUND BENZOIN TINCTURE 40 BAGS/CARTON 4 CARTONS/CASE C1544: Brand: 3M™ STERI-STRIP™

## (undated) DEVICE — 3M™ TEGADERM™ TRANSPARENT FILM DRESSING FRAME STYLE, 1624W, 2-3/8 IN X 2-3/4 IN (6 CM X 7 CM), 100/CT 4CT/CASE: Brand: 3M™ TEGADERM™

## (undated) DEVICE — ASCOPE 4 RHINOLARYNGO SLIM: Brand: ASCOPE™ 4 RHINOLARYNGO SLIM

## (undated) DEVICE — PROVE COVER: Brand: UNBRANDED

## (undated) DEVICE — NEEDLE 25G X 1 1/2

## (undated) DEVICE — ANTIBACTERIAL UNDYED BRAIDED (POLYGLACTIN 910), SYNTHETIC ABSORBABLE SUTURE: Brand: COATED VICRYL

## (undated) DEVICE — GAUZE SPONGES,USP TYPE VII GAUZE, 12 PLY: Brand: CURITY

## (undated) DEVICE — CORD BIPOLAR 12FT REUSEABLE

## (undated) DEVICE — SUT SILK 3-0 RB-1 CV-23 18IN C053D

## (undated) DEVICE — SKIN MARKER DUAL TIP WITH RULER CAP, FLEXIBLE RULER AND LABELS: Brand: DEVON

## (undated) DEVICE — GLOVE SRG BIOGEL 7.5